# Patient Record
Sex: FEMALE | Race: BLACK OR AFRICAN AMERICAN | Employment: FULL TIME | URBAN - METROPOLITAN AREA
[De-identification: names, ages, dates, MRNs, and addresses within clinical notes are randomized per-mention and may not be internally consistent; named-entity substitution may affect disease eponyms.]

---

## 2017-12-21 ENCOUNTER — OFFICE VISIT (OUTPATIENT)
Dept: SURGERY | Age: 67
End: 2017-12-21

## 2017-12-21 VITALS
WEIGHT: 174 LBS | HEART RATE: 84 BPM | HEIGHT: 67 IN | SYSTOLIC BLOOD PRESSURE: 120 MMHG | RESPIRATION RATE: 12 BRPM | TEMPERATURE: 98.6 F | DIASTOLIC BLOOD PRESSURE: 64 MMHG | BODY MASS INDEX: 27.31 KG/M2

## 2017-12-21 DIAGNOSIS — R22.31 AXILLARY MASS, RIGHT: Primary | ICD-10-CM

## 2017-12-21 PROCEDURE — 99214 OFFICE O/P EST MOD 30 MIN: CPT | Performed by: SURGERY

## 2017-12-21 ASSESSMENT — ENCOUNTER SYMPTOMS
EYE DISCHARGE: 0
EYE PAIN: 0
ANAL BLEEDING: 0
SHORTNESS OF BREATH: 0
BACK PAIN: 0
STRIDOR: 0
CHEST TIGHTNESS: 0
TROUBLE SWALLOWING: 0
CONSTIPATION: 0
ABDOMINAL PAIN: 0
COLOR CHANGE: 0
VOMITING: 0

## 2017-12-21 NOTE — PROGRESS NOTES
Xochitl Tyler      I have reviewed the Medical Assistant's entries in the Past Medical History, Medications, Allergies, Social History and Vital Sign sections      Chief Complaint   Patient presents with    Surgical Consult         HPI      Pt here for right axillar lesions    Pt s/p open biopsy right axillary nodes 12/16  PATh benign reactive nodes      Notes new lesions  Similar to before  Not draining  Mild pain    Denies breast mass, discharge, skin changes        Recent US and MRI show several < 1 cm nodes    I have reviewed  the radiologic images as well as the reports. PMH    No                      Hypertension  No                      Lipids/cholesterol  No                      ASCAD  No                      Diabetes    Rheumatoid arthritis        No past medical history on file. Past Surgical History:   Procedure Laterality Date    AXILLARY SURGERY Right 12/21/2016    DR Reji Moreira    LYMPH NODE BIOPSY Left     2010    LYMPH NODE DISSECTION Right 12/21/2016    EXCISION  OF RIGHT AXILLARY MASS performed by Reginaldo Smith MD at [facility]         Current problems include  There is no problem list on file for this patient.           Social History     Social History    Marital status:      Spouse name: N/A    Number of children: N/A    Years of education: N/A     Social History Main Topics    Smoking status: Never Smoker    Smokeless tobacco: Not on file    Alcohol use No    Drug use: Unknown    Sexual activity: Not on file     Other Topics Concern    Not on file     Social History Narrative    No narrative on file                 Family History   Problem Relation Age of Onset    Arthritis Sister     Asthma Maternal Cousin          Current Outpatient Prescriptions   Medication Sig Dispense Refill    hydroxychloroquine (PLAQUENIL) 200 MG tablet Take 400 mg by mouth daily      etanercept (ENBREL) 50 MG/ML injection Inject 25 mg into the skin once  hydrochlorothiazide (MICROZIDE) 12.5 MG capsule Take 12.5 mg by mouth daily       No current facility-administered medications for this visit. No Known Allergies      Review of Systems   Constitutional: Negative for chills, fatigue, fever and unexpected weight change. HENT: Negative for nosebleeds and trouble swallowing. Eyes: Negative for pain and discharge. Respiratory: Negative for chest tightness, shortness of breath and stridor. Cardiovascular: Negative for chest pain, palpitations and leg swelling. Gastrointestinal: Negative for abdominal pain, anal bleeding, constipation and vomiting. Genitourinary: Negative for difficulty urinating, dysuria and hematuria. Musculoskeletal: Negative for back pain, joint swelling and neck pain. Skin: Negative for color change, rash and wound. Neurological: Negative for seizures, facial asymmetry, speech difficulty and headaches. Hematological: Negative for adenopathy. Does not bruise/bleed easily. Psychiatric/Behavioral: Negative for behavioral problems and hallucinations. The patient is not nervous/anxious. Vitals:    12/21/17 1006   BP: 120/64   Pulse: 84   Resp: 12   Temp: 98.6 °F (37 °C)           Physical Exam   Constitutional: She is oriented to person, place, and time. She appears well-developed and well-nourished. HENT:   Head: Normocephalic and atraumatic. Eyes: EOM are normal. Pupils are equal, round, and reactive to light. Neck: Normal range of motion. Neck supple. Cardiovascular: Normal rate and regular rhythm. No murmur heard. Pulmonary/Chest: Effort normal and breath sounds normal. No respiratory distress. She has no wheezes. She has no rales. Breast    No mass either side  No skin changes  No discharge   Abdominal: She exhibits no distension and no mass. There is no tenderness. There is no rebound. Musculoskeletal: Normal range of motion. She exhibits no edema.    Left axilla    No mass,nodes      Right

## 2018-06-19 DIAGNOSIS — Z12.31 SCREENING MAMMOGRAM, ENCOUNTER FOR: ICD-10-CM

## 2018-06-19 DIAGNOSIS — R22.31 AXILLARY MASS, RIGHT: Primary | ICD-10-CM

## 2018-06-21 DIAGNOSIS — R22.31 AXILLARY MASS, RIGHT: Primary | ICD-10-CM

## 2018-06-28 ENCOUNTER — OFFICE VISIT (OUTPATIENT)
Dept: SURGERY | Age: 68
End: 2018-06-28
Payer: COMMERCIAL

## 2018-06-28 VITALS
OXYGEN SATURATION: 95 % | WEIGHT: 172 LBS | HEART RATE: 98 BPM | TEMPERATURE: 97.9 F | BODY MASS INDEX: 27 KG/M2 | DIASTOLIC BLOOD PRESSURE: 74 MMHG | SYSTOLIC BLOOD PRESSURE: 124 MMHG | HEIGHT: 67 IN

## 2018-06-28 DIAGNOSIS — R22.31 AXILLARY MASS, RIGHT: Primary | ICD-10-CM

## 2018-06-28 PROCEDURE — 99213 OFFICE O/P EST LOW 20 MIN: CPT | Performed by: SURGERY

## 2018-06-28 ASSESSMENT — ENCOUNTER SYMPTOMS
SHORTNESS OF BREATH: 0
EYE PAIN: 0
ANAL BLEEDING: 0
VOMITING: 0
BACK PAIN: 0
CONSTIPATION: 0
CHEST TIGHTNESS: 0
COLOR CHANGE: 0
EYE DISCHARGE: 0
STRIDOR: 0
TROUBLE SWALLOWING: 0
ABDOMINAL PAIN: 0

## 2019-02-11 ENCOUNTER — HOSPITAL ENCOUNTER (EMERGENCY)
Age: 69
Discharge: HOME | End: 2019-02-11
Attending: EMERGENCY MEDICINE

## 2019-02-11 ENCOUNTER — APPOINTMENT (OUTPATIENT)
Dept: GENERAL RADIOLOGY | Age: 69
End: 2019-02-11

## 2019-02-11 VITALS
SYSTOLIC BLOOD PRESSURE: 137 MMHG | HEIGHT: 67 IN | DIASTOLIC BLOOD PRESSURE: 67 MMHG | RESPIRATION RATE: 20 BRPM | BODY MASS INDEX: 26.68 KG/M2 | OXYGEN SATURATION: 96 % | HEART RATE: 84 BPM | TEMPERATURE: 97.9 F | WEIGHT: 170 LBS

## 2019-02-11 DIAGNOSIS — S99.921A INJURY OF RIGHT FOOT, INITIAL ENCOUNTER: ICD-10-CM
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
## 2019-02-11 DIAGNOSIS — S93.601A SPRAIN OF RIGHT FOOT, INITIAL ENCOUNTER: ICD-10-CM

## 2019-02-11 PROCEDURE — 99283 EMERGENCY DEPT VISIT LOW MDM: CPT

## 2019-02-11 PROCEDURE — 73630 X-RAY EXAM OF FOOT: CPT

## 2019-02-11 RX ORDER — NAPROXEN 500 MG/1
500 TABLET ORAL 2 TIMES DAILY
Qty: 20 TABLET | Refills: 0 | Status: SHIPPED | OUTPATIENT
Start: 2019-02-11 | End: 2019-02-11

## 2019-02-11 RX ORDER — TRAMADOL HYDROCHLORIDE 50 MG/1
50 TABLET ORAL EVERY 8 HOURS PRN
Qty: 20 TABLET | Refills: 0 | Status: SHIPPED | OUTPATIENT
Start: 2019-02-11 | End: 2019-02-14

## 2019-02-11 ASSESSMENT — ENCOUNTER SYMPTOMS
VOMITING: 0
EYE PAIN: 0
ABDOMINAL PAIN: 0
DIARRHEA: 0
WHEEZING: 0
SINUS PRESSURE: 0
VOICE CHANGE: 0
SHORTNESS OF BREATH: 0
COUGH: 0
EYE REDNESS: 0
CHEST TIGHTNESS: 0
SORE THROAT: 0
CHOKING: 0
BACK PAIN: 0
CONSTIPATION: 0
TROUBLE SWALLOWING: 0
FACIAL SWELLING: 0
STRIDOR: 0
BLOOD IN STOOL: 0
EYE DISCHARGE: 0

## 2019-02-11 ASSESSMENT — PAIN DESCRIPTION - FREQUENCY: FREQUENCY: CONTINUOUS

## 2019-02-11 ASSESSMENT — PAIN SCALES - GENERAL: PAINLEVEL_OUTOF10: 7

## 2019-02-11 ASSESSMENT — PAIN DESCRIPTION - LOCATION: LOCATION: FOOT

## 2019-02-11 ASSESSMENT — PAIN DESCRIPTION - DESCRIPTORS: DESCRIPTORS: ACHING

## 2019-02-11 ASSESSMENT — PAIN DESCRIPTION - ORIENTATION: ORIENTATION: RIGHT

## 2019-03-15 ENCOUNTER — TELEPHONE (OUTPATIENT)
Dept: SURGERY | Age: 69
End: 2019-03-15

## 2022-10-05 ENCOUNTER — OFFICE VISIT (OUTPATIENT)
Dept: PAIN MANAGEMENT | Age: 72
End: 2022-10-05
Payer: MEDICARE

## 2022-10-05 VITALS — TEMPERATURE: 96.7 F | BODY MASS INDEX: 25.74 KG/M2 | HEIGHT: 67 IN | WEIGHT: 164 LBS

## 2022-10-05 DIAGNOSIS — M47.817 LUMBOSACRAL SPONDYLOSIS WITHOUT MYELOPATHY: Primary | ICD-10-CM

## 2022-10-05 DIAGNOSIS — M48.062 SPINAL STENOSIS OF LUMBAR REGION WITH NEUROGENIC CLAUDICATION: ICD-10-CM

## 2022-10-05 PROBLEM — I10 ESSENTIAL (PRIMARY) HYPERTENSION: Status: ACTIVE | Noted: 2022-02-11

## 2022-10-05 PROBLEM — Z86.73 PRSNL HX OF TIA (TIA), AND CEREB INFRC W/O RESID DEFICITS: Status: ACTIVE | Noted: 2022-06-29

## 2022-10-05 PROBLEM — K21.9 GASTRO-ESOPHAGEAL REFLUX DISEASE WITHOUT ESOPHAGITIS: Status: ACTIVE | Noted: 2022-06-29

## 2022-10-05 PROBLEM — M06.9 RHEUMATOID ARTHRITIS, UNSPECIFIED (HCC): Status: ACTIVE | Noted: 2022-06-29

## 2022-10-05 PROBLEM — M47.816 LUMBAR SPONDYLOSIS: Status: ACTIVE | Noted: 2022-10-05

## 2022-10-05 PROCEDURE — 99214 OFFICE O/P EST MOD 30 MIN: CPT | Performed by: NURSE PRACTITIONER

## 2022-10-05 PROCEDURE — 1123F ACP DISCUSS/DSCN MKR DOCD: CPT | Performed by: NURSE PRACTITIONER

## 2022-10-05 RX ORDER — CARVEDILOL 6.25 MG/1
6.25 TABLET ORAL 2 TIMES DAILY WITH MEALS
COMMUNITY

## 2022-10-05 RX ORDER — LEFLUNOMIDE 10 MG/1
10 TABLET ORAL DAILY
COMMUNITY

## 2022-10-05 RX ORDER — POTASSIUM CHLORIDE 1.5 G/1.77G
20 POWDER, FOR SOLUTION ORAL 2 TIMES DAILY
COMMUNITY

## 2022-10-05 RX ORDER — ALBUTEROL SULFATE 90 UG/1
AEROSOL, METERED RESPIRATORY (INHALATION)
COMMUNITY
Start: 2022-08-25

## 2022-10-05 RX ORDER — PREDNISONE 1 MG/1
TABLET ORAL
COMMUNITY
Start: 2022-09-29

## 2022-10-05 RX ORDER — AMLODIPINE BESYLATE 2.5 MG/1
2.5 TABLET ORAL DAILY
COMMUNITY

## 2022-10-05 RX ORDER — MAGNESIUM OXIDE 400 MG/1
400 TABLET ORAL DAILY
COMMUNITY

## 2022-10-05 ASSESSMENT — ENCOUNTER SYMPTOMS
DIARRHEA: 0
TROUBLE SWALLOWING: 0
CONSTIPATION: 0
COUGH: 0
BACK PAIN: 1
SHORTNESS OF BREATH: 0
GASTROINTESTINAL NEGATIVE: 1
EYES NEGATIVE: 1

## 2022-10-05 NOTE — PROGRESS NOTES
Florian Saxena  (1950)    10/5/2022    Subjective:     Florian Saxena is 81 y.o. female who complains today of:    Chief Complaint   Patient presents with    Follow-up     Discuss Lumbar Injections         Allergies:  Naproxen, Toradol [ketorolac tromethamine], and Ultram [tramadol]    Past Medical History:   Diagnosis Date    Arthritis      Past Surgical History:   Procedure Laterality Date    AXILLARY SURGERY Right 2016    DR Flo Whitney    LYMPH NODE BIOPSY Left     2010    LYMPH NODE DISSECTION Right 2016    EXCISION  OF RIGHT AXILLARY MASS performed by Norma Escalante MD at [address] History   Problem Relation Age of Onset    Arthritis Sister     Asthma Maternal Cousin      Social History     Socioeconomic History    Marital status:      Spouse name: Not on file    Number of children: Not on file    Years of education: Not on file    Highest education level: Not on file   Occupational History    Not on file   Tobacco Use    Smoking status: Former     Packs/day: 0.50     Years: 5.00     Pack years: 2.50     Types: Cigarettes     Start date:      Quit date:      Years since quittin.7    Smokeless tobacco: Never   Vaping Use    Vaping Use: Never used   Substance and Sexual Activity    Alcohol use: Yes     Comment: Socially    Drug use: No    Sexual activity: Never   Other Topics Concern    Not on file   Social History Narrative    Not on file     Social Determinants of Health     Financial Resource Strain: Not on file   Food Insecurity: Not on file   Transportation Needs: Not on file   Physical Activity: Not on file   Stress: Not on file   Social Connections: Not on file   Intimate Partner Violence: Not on file   Housing Stability: Not on file       Current Outpatient Medications on File Prior to Visit   Medication Sig Dispense Refill    leflunomide (ARAVA) 10 MG tablet Take 10 mg by mouth daily      magnesium oxide (MAG-OX) 400 MG tablet Take 400 mg by mouth daily      potassium chloride (KLOR-CON) 20 MEQ packet Take 20 mEq by mouth 2 times daily      vitamin D (CHOLECALCIFEROL) 25 MCG (1000 UT) TABS tablet Take 1,000 Units by mouth daily      carvedilol (COREG) 6.25 MG tablet Take 6.25 mg by mouth 2 times daily (with meals)      amLODIPine (NORVASC) 2.5 MG tablet Take 2.5 mg by mouth daily      gabapentin (NEURONTIN) 300 MG capsule Take 1 capsule by mouth 2 times daily for 30 days. 60 capsule 0    ibuprofen (ADVIL;MOTRIN) 600 MG tablet Take 600 mg by mouth      Hydroxychloroquine Sulfate (PLAQUENIL PO) Take 400 mg by mouth daily      atorvastatin (LIPITOR) 40 MG tablet Take 40 mg by mouth daily      hydrochlorothiazide (MICROZIDE) 12.5 MG capsule Take 12.5 mg by mouth daily      albuterol sulfate HFA (PROVENTIL;VENTOLIN;PROAIR) 108 (90 Base) MCG/ACT inhaler INHALE 2 PUFFS 4 TIMES DAILY AS NEEDED      predniSONE (DELTASONE) 5 MG tablet       famotidine (PEPCID) 20 MG tablet Take 20 mg by mouth 2 times daily (Patient not taking: Reported on 10/5/2022)      lidocaine (LMX) 4 % cream Apply a half dollar sized amount to intact skin topically up to twice daily as needed for pain (Patient not taking: Reported on 10/5/2022) 1 Tube 1    etanercept (ENBREL) 50 MG/ML injection Inject 25 mg into the skin once (Patient not taking: Reported on 10/5/2022)       No current facility-administered medications on file prior to visit. Pt presents today for a f/u of her pain. PCP is Dr. Lyric Rosales MD.  Patient was last seen by Dr. Spring Hurt on 12/16/2020. At that time he had complaints of neck pain and low back pain. Had cervical epidural C7-T1 IL NOY on 11/17/2020 with significant relief. It appears she continues to use gabapentin from another provider, but says not using this. She has a history of RA and lung cancer status post right upper lobe wedge resection, and hiatal hernia. She presents today for c/o low back pain.  Says pain will radiate across her low back and upper buttock. Denies radiating numbness into LE. Says neck and arms \"doing ok\". Says she was doing well until recently and the pain \"came back\". Pt feels pain level 8/10. Pt feels that moving, standing and sitting prolonged makes the pain worse, and motrin OTC and Tylenol OTC makes the pain better. Pt denies radiating numbness and tingling. Denies recent falls, injuries or trauma. Pt denies new weakness. Pt reports PT has been done years ago. Review of Systems   Constitutional: Negative. Negative for fatigue. HENT: Negative. Negative for trouble swallowing. Eyes: Negative. Respiratory:  Negative for cough and shortness of breath. Cardiovascular:  Negative for chest pain. Gastrointestinal: Negative. Negative for constipation and diarrhea. Endocrine: Negative. Genitourinary: Negative. Musculoskeletal:  Positive for back pain. Skin: Negative. Neurological:  Negative for dizziness, weakness and headaches. Hematological: Negative. Psychiatric/Behavioral: Negative. Reviewed Dr. Megan Cherry notes and reports from 12/2020:  XR L-spine flexion-extension 10/15/2020:  Instability L4-5, retrolisthesis L1-2. XR C-spine 10/15/2020:  Degenerative disc disease, facet arthropathy, no fracture, mid chest consolidation with associated thoracotomy surgical material, C6 central listhesis. EMG B UE Dr Key Young 8/24/20: bilateral C6-7 radiculopathy  MRI C Spine 9/4/20: heterogenous boen marrow. C2/3 normal canal. C3/4 bilateral foramen narrowing, mild canal stenosis. C4/5 moderate right greater than left foramen narrowing, mild canal stenosis. C5/6 moderate bilateral foramen narrowing, mild canal stenosis. C6/7 moderate left and mild canal stenosis. Small cystic structures parotid glands. MRI LS Spine 5/18/20: moderate to severe canal stenosis L4/5 and caudal foramen narrowing L5/S1.    Rheumatoid Arthritis followed by Dr Chelle Fuentes on Enbrel, lung cancer status post right upper lobe wedge resection, hiatal hernia  Dr Guidry Organ 7/1/20: Lumbar epidural steroid inj L4/5. 5 mL of Lidocaine 0.5% and 10 mg dexamethasone. MRI LS Spine 5/18/20: moderate to severe canal stenosis L4/5 and caudal foramen narrowing L5/S1. Objective:     Vitals:  Temp (!) 96.7 °F (35.9 °C) (Infrared)   Ht 5' 7\" (1.702 m)   Wt 164 lb (74.4 kg)   LMP  (LMP Unknown)   BMI 25.69 kg/m² Pain Score:   8      Physical Exam  Vitals and nursing note reviewed. This is a pleasant female who answers questions appropriately and follows commands. Pt is alert and oriented x 3. Recent and remote memory is intact. Mood and affect, judgement and insight are normal.  No signs of distress, no dyspnea or SOB noted. HEENT: PERRL. Neck is supple, trachea midline. No lymphadenopathy noted. Decreased ROM with flexion and extension of low back. Tender with palpation to lumbar spine with palpation bilaterally with positive provacative maneuvers noted. Negative SLR. Tightness in both hamstrings noted. Balance and coordination normal.  Strength is functional for ambulation. Cranial nerves II-XII are intact. Assessment:      Diagnosis Orders   1. Lumbosacral spondylosis without myelopathy  CHG FLUOR NEEDLE/CATH SPINE/PARASPINAL DX/THER ADDON    PA INJ DX/THER AGNT PARAVERT FACET JOINT, LUMBAR/SAC, 2ND LEVEL    PA INJ DX/THER AGNT PARAVERT FACET JOINT, LUMBAR/SAC, ADD LEVEL    PA INJ DX/THER AGNT PARAVERT FACET JOINT, LUMBAR/SAC, 1ST LEVEL    XR LUMBAR SPINE (MIN 4 VIEWS)      2. Spinal stenosis of lumbar region with neurogenic claudication  XR LUMBAR SPINE (MIN 4 VIEWS)          Plan:          No orders of the defined types were placed in this encounter.       Orders Placed This Encounter   Procedures    XR LUMBAR SPINE (MIN 4 VIEWS)     Standing Status:   Future     Standing Expiration Date:   1/3/2023     Scheduling Instructions:      Xray Lumbar AP lateral     Order Specific Question:   Reason for exam:     Answer: low back pain    CHG FLUOR NEEDLE/CATH SPINE/PARASPINAL DX/THER ADDON     Standing Status:   Future     Standing Expiration Date:   1/3/2023    DC INJ DX/THER AGNT PARAVERT FACET JOINT, LUMBAR/SAC, 2ND LEVEL     Standing Status:   Future     Standing Expiration Date:   1/3/2023    DC INJ DX/THER AGNT PARAVERT FACET JOINT, LUMBAR/SAC, ADD LEVEL     Standing Status:   Future     Standing Expiration Date:   1/3/2023    DC INJ DX/THER AGNT PARAVERT FACET JOINT, LUMBAR/SAC, 1ST LEVEL     Standing Status:   Future     Standing Expiration Date:   1/3/2023     Discussed options with the patient today. Anatomic model pathology was shown and reviewed with pt. We discussed the options in detail today. We will order bilateral facet joint injections L3-4, L4-5, L5-S1 with Dr. Bridget Villafana (ok if need L2,3,4,5 MBB). Will order updated XR of low back to evaluate further as well. She has done PT in the past. Conservative treatment, including anti-inflammatories, has been tried and failed. Anatomic model of pathology was shown. Risks and benefits of the procedure were discussed. All questions were answered and patient understands and agrees with the plan. She may need valium prior to procedure. She will call if needed  . Discussed home exercise program.  Relevant imaging and pain generators reviewed. Pt verbalized understanding and agrees with above plan. Pt has chronic pain. OARRS was reviewed. This NP saw pt under direct supervision of Dr. Bridget Villafana. Follow up:  Return for for procedure with Dr. Bridget Villafana.     Geetha Lindsey, APRN - CNP

## 2022-10-12 ENCOUNTER — TELEPHONE (OUTPATIENT)
Dept: PAIN MANAGEMENT | Age: 72
End: 2022-10-12

## 2022-10-12 NOTE — TELEPHONE ENCOUNTER
PLEASE CONTACT PATIENT AND SEE IF SHE HAS COMPLETED LUMBAR XR YET.     THIS WILL NEED TO BE COMPLETED IN ORDER FOR US TO SUBMIT FOR AUTH FOR THE LUMBAR MBB

## 2022-10-13 ENCOUNTER — TELEPHONE (OUTPATIENT)
Dept: PAIN MANAGEMENT | Age: 72
End: 2022-10-13

## 2022-10-13 DIAGNOSIS — M47.817 LUMBOSACRAL SPONDYLOSIS WITHOUT MYELOPATHY: ICD-10-CM

## 2022-10-13 DIAGNOSIS — M48.062 SPINAL STENOSIS OF LUMBAR REGION WITH NEUROGENIC CLAUDICATION: ICD-10-CM

## 2022-10-13 NOTE — TELEPHONE ENCOUNTER
ADELINE L3,4,5 MBB    AUTH FROM 10/19/22-11/1/22    OK to schedule procedure approved as above. Please note sides/levels approved and date range. (If applicable, sides/levels approved may differ from those ordered)    TO BE SCHEDULED WITH DR. Ricky Lowery

## 2022-10-20 ENCOUNTER — TELEPHONE (OUTPATIENT)
Dept: PAIN MANAGEMENT | Age: 72
End: 2022-10-20

## 2022-10-20 NOTE — TELEPHONE ENCOUNTER
BENEFITS: BILATERAL L3,4,5 MBB      Insurance: [facility]  Phone: [phone]  Contact Name: Donny Manzano  Effective Date: 1.1.2022     Plan year: YES-CALENDAR  Deductible: 0.00      Deductible Met: 0.00  Allowed/benefits paid at: 100% AFTER $35.00 COPAY  OOP: 3800.00 MET $1159.27  Freq Limits: 65734 & 64494--BASED ON MEDICAL NECESSITY  Prior Auth Requirement: AUTH IS REQUIRED THROUGH [facility]--AUTH COMPLETED BY [facility]    Notes: NO PRE-EX CLAUSE    Call Reference #: [id number]    Time of call: 11:20AM

## 2022-10-27 ENCOUNTER — PROCEDURE VISIT (OUTPATIENT)
Dept: PAIN MANAGEMENT | Age: 72
End: 2022-10-27
Payer: MEDICARE

## 2022-10-27 DIAGNOSIS — M47.817 LUMBOSACRAL SPONDYLOSIS WITHOUT MYELOPATHY: Primary | ICD-10-CM

## 2022-10-27 PROCEDURE — 64493 INJ PARAVERT F JNT L/S 1 LEV: CPT | Performed by: PHYSICAL MEDICINE & REHABILITATION

## 2022-10-27 PROCEDURE — 64494 INJ PARAVERT F JNT L/S 2 LEV: CPT | Performed by: PHYSICAL MEDICINE & REHABILITATION

## 2022-10-27 RX ORDER — BETAMETHASONE SODIUM PHOSPHATE AND BETAMETHASONE ACETATE 3; 3 MG/ML; MG/ML
3 INJECTION, SUSPENSION INTRA-ARTICULAR; INTRALESIONAL; INTRAMUSCULAR; SOFT TISSUE ONCE
Status: COMPLETED | OUTPATIENT
Start: 2022-10-27 | End: 2022-10-28

## 2022-10-27 RX ORDER — LIDOCAINE HYDROCHLORIDE 10 MG/ML
8 INJECTION, SOLUTION INFILTRATION; PERINEURAL ONCE
Status: COMPLETED | OUTPATIENT
Start: 2022-10-27 | End: 2022-10-28

## 2022-10-27 NOTE — PROGRESS NOTES
Lumbar Medial Branch Blocks      Patient Name: Rola Hardy   : 1950  Date: 10/27/2022     Provider: Francesca Velásquez MD        Rola Hardy is here today for interventional pain management. Preoperatively, the patient presents with symptoms and physical exam findings consistent with lumbar facet zygapophyseal joint mediated pain. She has had persistent pain that limits her function and activities of daily living. The pain is persistent despite conservative measures. She has significant functional and psychological impairment due to this condition. Given her symptoms, physical exam findings, impairment in activities of daily living, and lack of response to conservative measures, consideration for lumbar medial branch blocks was given. Discussed the risks of the procedure including, but not limited to, bleeding, infection, worsened pain, damage to surrounding structures, side effects, toxicity, allergic reactions to medications used, immune and stress-response dysfunction, fat necrosis, avascular necrosis, skin pigmentation changes, blood sugar elevation, headache, vision changes, need for surgery, as well as catastrophic injury such as vision loss, paralysis, stroke, spinal cord infarction or injury, intrathecal injection, spinal cord puncture, arachnoiditis, bowel or bladder incontinence, loss of use of the legs, ventilator dependence, and death. Discussed the risks, benefits, alternative procedures, and alternatives to the procedure including no procedure at all. Discussed that we cannot undo any permanent neurologic damage or change the course of any underlying disease. After thorough discussion, patient expressed understanding and willingness to proceed. Written consent was obtained and is in the chart. Verbal consent to proceed was obtained. Standard ASIPP guidelines were followed and sterile technique used. Area was cleaned with Betadine three times.  Fluoroscopic guidance was used for this procedure. The L5 vertebral body was taken as the first lumbar-appearing vertebral body directly above the sacrum on a lateral view. Junction of superior articular process and transverse process was identified. For L5 dorsal primary ramus groove of sacral ala and superior articular process of S1 was identified. Then two 26 gauge 3.5 inch spinal needles were used and each needle was advanced to each medial branch and/or dorsal ramus. Aspiration was negative throughout the procedure. Oblique and declined views were obtained. Each medial branch and/or dorsal ramus was treated with approximately 0.5 mL of 1% preservative-free Lidocaine. A total of 0.5 mL of 3 mg of Betamethasone was used for today's procedure. She tolerated the procedure fair, no obvious complications occurred during the procedure. She was appropriately monitored and discharged home in stable condition with her usual motor strength. She will keep close track of pain over the next several hours and call our office tomorrow and let us know what percentage of pain relief is experienced. Postoperative instructions were provided.             [x] Bilateral [] T12    [] L1   [] Right [] L2    [x] L3   [] Left [x] L4      [x] L5         [address]  Phone [phone]

## 2022-10-28 RX ADMIN — LIDOCAINE HYDROCHLORIDE 8 ML: 10 INJECTION, SOLUTION INFILTRATION; PERINEURAL at 07:37

## 2022-10-28 RX ADMIN — Medication 0.5 MEQ: at 07:36

## 2022-10-28 RX ADMIN — BETAMETHASONE SODIUM PHOSPHATE AND BETAMETHASONE ACETATE 3 MG: 3; 3 INJECTION, SUSPENSION INTRA-ARTICULAR; INTRALESIONAL; INTRAMUSCULAR; SOFT TISSUE at 07:36

## 2022-11-17 ENCOUNTER — OFFICE VISIT (OUTPATIENT)
Dept: PAIN MANAGEMENT | Age: 72
End: 2022-11-17
Payer: MEDICARE

## 2022-11-17 VITALS
HEIGHT: 67 IN | BODY MASS INDEX: 26.37 KG/M2 | SYSTOLIC BLOOD PRESSURE: 124 MMHG | DIASTOLIC BLOOD PRESSURE: 72 MMHG | WEIGHT: 168 LBS | TEMPERATURE: 96.7 F

## 2022-11-17 DIAGNOSIS — M47.817 LUMBOSACRAL SPONDYLOSIS WITHOUT MYELOPATHY: Primary | ICD-10-CM

## 2022-11-17 PROCEDURE — 99213 OFFICE O/P EST LOW 20 MIN: CPT | Performed by: NURSE PRACTITIONER

## 2022-11-17 PROCEDURE — 3078F DIAST BP <80 MM HG: CPT | Performed by: NURSE PRACTITIONER

## 2022-11-17 PROCEDURE — 3074F SYST BP LT 130 MM HG: CPT | Performed by: NURSE PRACTITIONER

## 2022-11-17 PROCEDURE — 1123F ACP DISCUSS/DSCN MKR DOCD: CPT | Performed by: NURSE PRACTITIONER

## 2022-11-17 ASSESSMENT — ENCOUNTER SYMPTOMS
BACK PAIN: 1
DIARRHEA: 0
CONSTIPATION: 0
RESPIRATORY NEGATIVE: 1

## 2022-11-17 NOTE — PROGRESS NOTES
Patient: Michael Dey  YOB: 1950  Date: 22        Subjective:     Michael Dey is a 81 y.o. female who complains today of:    Chief Complaint   Patient presents with    Back Pain         Allergies:  Naproxen, Toradol [ketorolac tromethamine], and Ultram [tramadol]    Past Medical History:   Diagnosis Date    Arthritis      Past Surgical History:   Procedure Laterality Date    AXILLARY SURGERY Right 2016    DR Gerri Lesch    LYMPH NODE BIOPSY Left     2010    LYMPH NODE DISSECTION Right 2016    EXCISION  OF RIGHT AXILLARY MASS performed by Wendy Dorsey MD at [address] History   Problem Relation Age of Onset    Arthritis Sister     Asthma Maternal Cousin      Social History     Socioeconomic History    Marital status:      Spouse name: Not on file    Number of children: Not on file    Years of education: Not on file    Highest education level: Not on file   Occupational History    Not on file   Tobacco Use    Smoking status: Former     Packs/day: 0.50     Years: 5.00     Pack years: 2.50     Types: Cigarettes     Start date:      Quit date:      Years since quittin.9    Smokeless tobacco: Never   Vaping Use    Vaping Use: Never used   Substance and Sexual Activity    Alcohol use: Yes     Comment: Socially    Drug use: No    Sexual activity: Never   Other Topics Concern    Not on file   Social History Narrative    Not on file     Social Determinants of Health     Financial Resource Strain: Not on file   Food Insecurity: Not on file   Transportation Needs: Not on file   Physical Activity: Not on file   Stress: Not on file   Social Connections: Not on file   Intimate Partner Violence: Not on file   Housing Stability: Not on file       Current Outpatient Medications on File Prior to Visit   Medication Sig Dispense Refill    leflunomide (ARAVA) 10 MG tablet Take 10 mg by mouth daily      magnesium oxide (MAG-OX) 400 MG tablet Take 400 mg by mouth daily      potassium chloride (KLOR-CON) 20 MEQ packet Take 20 mEq by mouth 2 times daily      vitamin D (CHOLECALCIFEROL) 25 MCG (1000 UT) TABS tablet Take 1,000 Units by mouth daily      carvedilol (COREG) 6.25 MG tablet Take 6.25 mg by mouth 2 times daily (with meals)      amLODIPine (NORVASC) 2.5 MG tablet Take 2.5 mg by mouth daily      albuterol sulfate HFA (PROVENTIL;VENTOLIN;PROAIR) 108 (90 Base) MCG/ACT inhaler INHALE 2 PUFFS 4 TIMES DAILY AS NEEDED      ibuprofen (ADVIL;MOTRIN) 600 MG tablet Take 600 mg by mouth      Hydroxychloroquine Sulfate (PLAQUENIL PO) Take 400 mg by mouth daily      atorvastatin (LIPITOR) 40 MG tablet Take 40 mg by mouth daily      hydrochlorothiazide (MICROZIDE) 12.5 MG capsule Take 12.5 mg by mouth daily      gabapentin (NEURONTIN) 300 MG capsule Take 1 capsule by mouth 2 times daily for 30 days. 60 capsule 0     No current facility-administered medications on file prior to visit. 10/27/22 MBB #1 L345 relieved the back pain but now has tingling down left leg to ankle    Patient was last seen by Dr. Kp Ellsworth on 12/16/2020. At that time he had complaints of neck pain and low back pain. Had cervical epidural C7-T1 IL NOY on 11/17/2020 with significant relief. It appears she continues to use gabapentin from another provider, but says not using this. She has a history of RA and lung cancer status post right upper lobe wedge resection, and hiatal hernia. She presents today for c/o low back pain. Says pain will radiate across her low back and upper buttock. Denies radiating numbness into LE. Says neck and arms \"doing ok\". Says she was doing well until recently and the pain \"came back\". Pt feels pain level 8/10. Pt feels that moving, standing and sitting prolonged makes the pain worse, and motrin OTC and Tylenol OTC makes the pain better.  works as [employer]. Pt denies radiating numbness and tingling. Denies recent falls, injuries or trauma.   Pt denies new weakness. Pt reports PT has been done years ago. She has a history of RA  and Lupus on new medication and lung cancer status post right upper lobe wedge resection, and hiatal hernia. Review of Systems   Constitutional: Negative. Negative for activity change and unexpected weight change. HENT: Negative. Negative for hearing loss. Respiratory: Negative. Cardiovascular:  Negative for leg swelling. Gastrointestinal:  Negative for constipation and diarrhea. Genitourinary: Negative. Musculoskeletal:  Positive for back pain. Negative for gait problem, joint swelling and neck pain. Skin:  Negative for rash. Neurological:  Negative for dizziness, weakness, numbness and headaches. Psychiatric/Behavioral: Negative. Negative for sleep disturbance. Objective:     Vitals:  /72 (Site: Right Upper Arm)   Temp (!) 96.7 °F (35.9 °C) (Temporal)   Ht 5' 7\" (1.702 m)   Wt 168 lb (76.2 kg)   LMP  (LMP Unknown)   BMI 26.31 kg/m² Pain Score:   3    Physical Exam  Vitals reviewed. Constitutional:       Appearance: She is well-developed. HENT:      Head: Normocephalic. Nose: Nose normal.   Pulmonary:      Effort: Pulmonary effort is normal.   Musculoskeletal:      Cervical back: Normal range of motion and neck supple. Lumbar back: Tenderness present. Decreased range of motion. Negative right straight leg raise test and negative left straight leg raise test.      Comments: Tingling left leg with SLR   Lymphadenopathy:      Cervical: No cervical adenopathy. Skin:     General: Skin is warm and dry. Findings: No erythema or rash. Neurological:      Mental Status: She is alert and oriented to person, place, and time. Cranial Nerves: No cranial nerve deficit. Deep Tendon Reflexes: Reflexes are normal and symmetric. Reflexes normal.   Psychiatric:         Mood and Affect: Mood normal.         Behavior: Behavior normal.         Thought Content:  Thought content normal.         Judgment: Judgment normal.          Assessment:        Diagnosis Orders   1. Lumbosacral spondylosis without myelopathy            Plan:          No orders of the defined types were placed in this encounter. No orders of the defined types were placed in this encounter.  -she is on new medication for lupus and states her immune system is down and she doesn't want to return for EMG. She also doesn't have back pain and doesn't need the RFA for now. She declines any further tx until she gets used to her meds. Follow up:  Return if symptoms worsen or fail to improve.     Sylvester Petty, APRN - CNP

## 2023-09-13 LAB
APPEARANCE, URINE: CLEAR
BILIRUBIN, URINE: NEGATIVE
BLOOD, URINE: NEGATIVE
COLOR, URINE: NORMAL
GLUCOSE, URINE: NEGATIVE MG/DL
KETONES, URINE: NEGATIVE MG/DL
LEUKOCYTE ESTERASE, URINE: NEGATIVE
NITRITE, URINE: NEGATIVE
PH, URINE: 6 (ref 5–8)
PROTEIN, URINE: NEGATIVE MG/DL
SPECIFIC GRAVITY, URINE: 1.01 (ref 1–1.03)
UROBILINOGEN, URINE: <2 MG/DL (ref 0–1.9)

## 2023-09-15 LAB — URINE CULTURE: NORMAL

## 2023-10-22 ENCOUNTER — APPOINTMENT (OUTPATIENT)
Dept: RADIOLOGY | Facility: HOSPITAL | Age: 73
End: 2023-10-22
Payer: MEDICARE

## 2023-10-22 ENCOUNTER — HOSPITAL ENCOUNTER (EMERGENCY)
Facility: HOSPITAL | Age: 73
Discharge: HOME | End: 2023-10-22
Attending: STUDENT IN AN ORGANIZED HEALTH CARE EDUCATION/TRAINING PROGRAM
Payer: MEDICARE

## 2023-10-22 VITALS
HEART RATE: 66 BPM | SYSTOLIC BLOOD PRESSURE: 144 MMHG | TEMPERATURE: 97.2 F | DIASTOLIC BLOOD PRESSURE: 72 MMHG | BODY MASS INDEX: 26.68 KG/M2 | WEIGHT: 170 LBS | HEIGHT: 67 IN | OXYGEN SATURATION: 99 % | RESPIRATION RATE: 21 BRPM

## 2023-10-22 DIAGNOSIS — M94.0 COSTOCHONDRITIS: Primary | ICD-10-CM

## 2023-10-22 LAB
ALBUMIN SERPL BCP-MCNC: 4.2 G/DL (ref 3.4–5)
ALP SERPL-CCNC: 59 U/L (ref 33–136)
ALT SERPL W P-5'-P-CCNC: 28 U/L (ref 7–45)
ANION GAP SERPL CALC-SCNC: 7 MMOL/L (ref 10–20)
AST SERPL W P-5'-P-CCNC: 35 U/L (ref 9–39)
BASOPHILS # BLD MANUAL: 0 X10*3/UL (ref 0–0.1)
BASOPHILS NFR BLD MANUAL: 0 %
BILIRUB SERPL-MCNC: 0.5 MG/DL (ref 0–1.2)
BUN SERPL-MCNC: 15 MG/DL (ref 6–23)
CALCIUM SERPL-MCNC: 9.5 MG/DL (ref 8.6–10.3)
CARDIAC TROPONIN I PNL SERPL HS: 6 NG/L (ref 0–13)
CHLORIDE SERPL-SCNC: 101 MMOL/L (ref 98–107)
CO2 SERPL-SCNC: 30 MMOL/L (ref 21–32)
CREAT SERPL-MCNC: 0.85 MG/DL (ref 0.5–1.05)
EOSINOPHIL # BLD MANUAL: 0.03 X10*3/UL (ref 0–0.4)
EOSINOPHIL NFR BLD MANUAL: 1 %
ERYTHROCYTE [DISTWIDTH] IN BLOOD BY AUTOMATED COUNT: 11.7 % (ref 11.5–14.5)
GFR SERPL CREATININE-BSD FRML MDRD: 73 ML/MIN/1.73M*2
GLUCOSE SERPL-MCNC: 91 MG/DL (ref 74–99)
HCT VFR BLD AUTO: 39.1 % (ref 36–46)
HGB BLD-MCNC: 13.2 G/DL (ref 12–16)
HOLD SPECIMEN: NORMAL
IMM GRANULOCYTES # BLD AUTO: 0 X10*3/UL (ref 0–0.5)
IMM GRANULOCYTES NFR BLD AUTO: 0 % (ref 0–0.9)
LYMPHOCYTES # BLD MANUAL: 1.83 X10*3/UL (ref 0.8–3)
LYMPHOCYTES NFR BLD MANUAL: 59 %
MCH RBC QN AUTO: 31.5 PG (ref 26–34)
MCHC RBC AUTO-ENTMCNC: 33.8 G/DL (ref 32–36)
MCV RBC AUTO: 93 FL (ref 80–100)
MONOCYTES # BLD MANUAL: 0.34 X10*3/UL (ref 0.05–0.8)
MONOCYTES NFR BLD MANUAL: 11 %
NEUTS SEG # BLD MANUAL: 0.84 X10*3/UL (ref 1.6–5)
NEUTS SEG NFR BLD MANUAL: 27 %
NRBC BLD-RTO: 0 /100 WBCS (ref 0–0)
PLATELET # BLD AUTO: 206 X10*3/UL (ref 150–450)
PMV BLD AUTO: 10 FL (ref 7.5–11.5)
POTASSIUM SERPL-SCNC: 4.1 MMOL/L (ref 3.5–5.3)
PROT SERPL-MCNC: 8 G/DL (ref 6.4–8.2)
RBC # BLD AUTO: 4.19 X10*6/UL (ref 4–5.2)
RBC MORPH BLD: ABNORMAL
SODIUM SERPL-SCNC: 134 MMOL/L (ref 136–145)
TOTAL CELLS COUNTED BLD: 100
VARIANT LYMPHS # BLD MANUAL: 0.06 X10*3/UL (ref 0–0.3)
VARIANT LYMPHS NFR BLD: 2 %
WBC # BLD AUTO: 3.1 X10*3/UL (ref 4.4–11.3)

## 2023-10-22 PROCEDURE — 85007 BL SMEAR W/DIFF WBC COUNT: CPT | Performed by: STUDENT IN AN ORGANIZED HEALTH CARE EDUCATION/TRAINING PROGRAM

## 2023-10-22 PROCEDURE — 93010 ELECTROCARDIOGRAM REPORT: CPT | Performed by: EMERGENCY MEDICINE

## 2023-10-22 PROCEDURE — 85027 COMPLETE CBC AUTOMATED: CPT | Performed by: STUDENT IN AN ORGANIZED HEALTH CARE EDUCATION/TRAINING PROGRAM

## 2023-10-22 PROCEDURE — 2500000004 HC RX 250 GENERAL PHARMACY W/ HCPCS (ALT 636 FOR OP/ED)

## 2023-10-22 PROCEDURE — 36415 COLL VENOUS BLD VENIPUNCTURE: CPT | Performed by: STUDENT IN AN ORGANIZED HEALTH CARE EDUCATION/TRAINING PROGRAM

## 2023-10-22 PROCEDURE — 71045 X-RAY EXAM CHEST 1 VIEW: CPT | Mod: FY,FR

## 2023-10-22 PROCEDURE — 99284 EMERGENCY DEPT VISIT MOD MDM: CPT | Performed by: STUDENT IN AN ORGANIZED HEALTH CARE EDUCATION/TRAINING PROGRAM

## 2023-10-22 PROCEDURE — 99285 EMERGENCY DEPT VISIT HI MDM: CPT | Performed by: EMERGENCY MEDICINE

## 2023-10-22 PROCEDURE — 80053 COMPREHEN METABOLIC PANEL: CPT | Performed by: STUDENT IN AN ORGANIZED HEALTH CARE EDUCATION/TRAINING PROGRAM

## 2023-10-22 PROCEDURE — 71045 X-RAY EXAM CHEST 1 VIEW: CPT | Mod: FOREIGN READ | Performed by: RADIOLOGY

## 2023-10-22 PROCEDURE — 96374 THER/PROPH/DIAG INJ IV PUSH: CPT

## 2023-10-22 PROCEDURE — 84484 ASSAY OF TROPONIN QUANT: CPT | Performed by: STUDENT IN AN ORGANIZED HEALTH CARE EDUCATION/TRAINING PROGRAM

## 2023-10-22 RX ORDER — IBUPROFEN 600 MG/1
600 TABLET ORAL EVERY 6 HOURS SCHEDULED
Status: DISCONTINUED | OUTPATIENT
Start: 2023-10-22 | End: 2023-10-22 | Stop reason: HOSPADM

## 2023-10-22 RX ORDER — LIDOCAINE 50 MG/G
1 PATCH TOPICAL DAILY
Qty: 4 PATCH | Refills: 0 | Status: SHIPPED | OUTPATIENT
Start: 2023-10-22 | End: 2024-04-02 | Stop reason: HOSPADM

## 2023-10-22 RX ORDER — KETOROLAC TROMETHAMINE 15 MG/ML
15 INJECTION, SOLUTION INTRAMUSCULAR; INTRAVENOUS ONCE
Status: COMPLETED | OUTPATIENT
Start: 2023-10-22 | End: 2023-10-22

## 2023-10-22 RX ORDER — TIZANIDINE 2 MG/1
2 TABLET ORAL EVERY 6 HOURS
Qty: 30 TABLET | Refills: 0 | Status: SHIPPED | OUTPATIENT
Start: 2023-10-22 | End: 2024-04-02 | Stop reason: HOSPADM

## 2023-10-22 RX ADMIN — KETOROLAC TROMETHAMINE 15 MG: 15 INJECTION INTRAMUSCULAR; INTRAVENOUS at 16:01

## 2023-10-22 ASSESSMENT — PAIN SCALES - GENERAL
PAINLEVEL_OUTOF10: 9
PAINLEVEL_OUTOF10: 6

## 2023-10-22 ASSESSMENT — LIFESTYLE VARIABLES
REASON UNABLE TO ASSESS: NO
HAVE YOU EVER FELT YOU SHOULD CUT DOWN ON YOUR DRINKING: NO
HAVE PEOPLE ANNOYED YOU BY CRITICIZING YOUR DRINKING: NO
EVER HAD A DRINK FIRST THING IN THE MORNING TO STEADY YOUR NERVES TO GET RID OF A HANGOVER: NO
EVER FELT BAD OR GUILTY ABOUT YOUR DRINKING: NO

## 2023-10-22 ASSESSMENT — COLUMBIA-SUICIDE SEVERITY RATING SCALE - C-SSRS
1. IN THE PAST MONTH, HAVE YOU WISHED YOU WERE DEAD OR WISHED YOU COULD GO TO SLEEP AND NOT WAKE UP?: NO
2. HAVE YOU ACTUALLY HAD ANY THOUGHTS OF KILLING YOURSELF?: NO
6. HAVE YOU EVER DONE ANYTHING, STARTED TO DO ANYTHING, OR PREPARED TO DO ANYTHING TO END YOUR LIFE?: NO

## 2023-10-22 ASSESSMENT — HEART SCORE
TROPONIN: LESS THAN OR EQUAL TO NORMAL LIMIT
AGE: 65+
RISK FACTORS: 1-2 RISK FACTORS
ECG: NORMAL
HEART SCORE: 3
HISTORY: SLIGHTLY SUSPICIOUS

## 2023-10-22 ASSESSMENT — PAIN DESCRIPTION - PAIN TYPE: TYPE: ACUTE PAIN

## 2023-10-22 ASSESSMENT — PAIN - FUNCTIONAL ASSESSMENT: PAIN_FUNCTIONAL_ASSESSMENT: 0-10

## 2023-10-22 ASSESSMENT — PAIN DESCRIPTION - LOCATION: LOCATION: CHEST

## 2023-10-22 ASSESSMENT — PAIN DESCRIPTION - DESCRIPTORS: DESCRIPTORS: ACHING

## 2023-10-22 NOTE — DISCHARGE INSTRUCTIONS
Seek immediate medical attention should you have:  shortness of breath, lightheadedness, weakness, confusion, jaw pain, numbness down arm or any worsening or concerning symptoms.

## 2023-10-22 NOTE — ED PROVIDER NOTES
HPI   Chief Complaint   Patient presents with   • Chest Pain       73-year-old female with a history of SLE, HTN, and lung cancer s/p resection 2001 presenting to [facility] ED with complaint of left-sided chest pain.  She reports having left-sided chest pain for the past week that has been constant at an 8/10 radiating to her back described as if someone punched her chest that is worse with deep breaths or any movements up to a 10/10.  States that she occasionally feels throat soreness with it.  Reports taking Tylenol and Motrin without much relief.  States she was seen for this at another hospital in which her cardiac work-up was negative and they treated her for acid reflux but this did not help.  Denies numbness or tingling down the arms, jaw pain, lightheadedness, coughing, leg swelling, family history of heart attacks, or any smoking for the past 50 years.      History provided by:  Patient                      No data recorded HEART Score: 3                Patient History   Past Medical History:   Diagnosis Date   • Personal history of other diseases of the circulatory system     History of hypertension   • Personal history of other diseases of the musculoskeletal system and connective tissue     History of rheumatoid arthritis   • Personal history of other malignant neoplasm of bronchus and lung     History of malignant neoplasm of lung   • Systemic lupus erythematosus, unspecified (CMS/HCC)     Lupus     Past Surgical History:   Procedure Laterality Date   • MR HEAD ANGIO WO IV CONTRAST  9/4/2020    MR HEAD ANGIO WO IV CONTRAST 9/4/2020 [facility] EMERGENCY LEGACY   • OTHER SURGICAL HISTORY  01/16/2021    Lumpectomy   • OTHER SURGICAL HISTORY  01/28/2022    Hysterectomy   • US ASPIRATION INJECTION INTERMEDIATE JOINT  6/10/2021    US ASPIRATION INJECTION INTERMEDIATE JOINT 6/10/2021 [facility]     No family history on file.  Social History     Tobacco Use   • Smoking status: Former     Types: Cigarettes   •  Smokeless tobacco: Not on file   Substance Use Topics   • Alcohol use: Never   • Drug use: Never       Physical Exam   ED Triage Vitals [10/22/23 1440]   Temp Heart Rate Resp BP   36.2 °C (97.2 °F) 88 16 143/76      SpO2 Temp Source Heart Rate Source Patient Position   96 % Temporal -- Sitting      BP Location FiO2 (%)     Right arm --       Physical Exam  Vitals and nursing note reviewed.   Constitutional:       General: She is awake. She is not in acute distress.     Appearance: Normal appearance. She is well-developed.   HENT:      Head: Normocephalic and atraumatic.      Right Ear: External ear normal.      Left Ear: External ear normal.      Nose: Nose normal. No congestion or rhinorrhea.      Mouth/Throat:      Mouth: Mucous membranes are moist.      Pharynx: Oropharynx is clear. No posterior oropharyngeal erythema.   Eyes:      General: No scleral icterus.        Right eye: No discharge.         Left eye: No discharge.      Extraocular Movements: Extraocular movements intact.      Conjunctiva/sclera: Conjunctivae normal.   Cardiovascular:      Rate and Rhythm: Normal rate and regular rhythm.      Pulses: Normal pulses.      Heart sounds: Normal heart sounds. No murmur heard.     No friction rub.   Pulmonary:      Effort: Pulmonary effort is normal. No respiratory distress.      Breath sounds: Normal breath sounds. No stridor. No wheezing or rales.   Chest:      Chest wall: Tenderness present.       Abdominal:      General: There is no distension.      Palpations: Abdomen is soft.      Tenderness: There is no abdominal tenderness. There is no right CVA tenderness, left CVA tenderness, guarding or rebound.   Musculoskeletal:         General: No swelling.      Cervical back: Normal range of motion and neck supple. No deformity or tenderness.      Thoracic back: Tenderness present. No deformity.      Lumbar back: No deformity or tenderness.        Back:       Right lower leg: No edema.      Left lower leg: No  edema.   Skin:     General: Skin is warm and dry.      Capillary Refill: Capillary refill takes less than 2 seconds.      Coloration: Skin is not jaundiced or pale.      Findings: No lesion or rash.   Neurological:      General: No focal deficit present.      Mental Status: She is alert and oriented to person, place, and time. Mental status is at baseline.      Cranial Nerves: No cranial nerve deficit.      Sensory: No sensory deficit.      Motor: No weakness.   Psychiatric:         Mood and Affect: Mood normal.         Behavior: Behavior normal. Behavior is cooperative.         Thought Content: Thought content normal.         Judgment: Judgment normal.       ED Course & MDM   ED Course as of 10/22/23 2359   Sun Oct 22, 2023   1652 Troponin I, High Sensitivity: 6 [ES]      ED Course User Index  [ES] Cameron Young MD         Diagnoses as of 10/22/23 2359   Costochondritis       Medical Decision Making  73-year-old female with a history mentioned below presenting to ED with complaint of chest pain.    Patient is hypertensive at 143/76 but otherwise afebrile, hemodynamically stable on room air, and in no acute distress.  Physical exam findings mentioned above.  CBC, CMP, EKG, troponin, and CXR ordered.  Toradol provided.  Bedside cardiac ultrasound performed and was unimpressive for pericardial effusion.    EKG Interpretation: Rate: 78. Rhythm: Sinus.  LAD.  No delta-wave, FARHAD, deep TWI or biphasic t-waves in V1-V5, or LBBB. QTc: not prolonged at 410 ms.    My independent interpretation of labs:  Labs returned unimpressive for systemic inflammation or infection, acute anemia or blood loss, MADY, hepatitis, chronic ischemia, or electrolyte abnormalities.  My indpendent interpretation of imaging:  CXR unimpressive for pneumonia, including effusions, infiltrates, or consolidations and no sign of pneumothorax or aortic widening.    Reassessment:  Patient reports pain improved to a 6/10 and not the edge off after receiving  Toradol.  Patient reassured of findings unimpressive for cardiac origin of pain.  We suspect inflammation of the tendons and/or ligaments of the thoracic wall.  Should symptoms persist, she may benefit from steroids.    Disposition: Discussed patient who agreed with discharge.  She will utilize tizanidine, Motrin scheduled, and lidocaine patches.  She will follow-up with her PCP and rheumatologist as scheduled this week.  Strict return precautions provided.        Procedure  Procedures     Cameron Young MD  Resident  10/23/23 0002

## 2023-10-24 ENCOUNTER — HOSPITAL ENCOUNTER (OUTPATIENT)
Dept: CARDIOLOGY | Facility: HOSPITAL | Age: 73
Discharge: HOME | End: 2023-10-24
Payer: MEDICARE

## 2023-10-24 LAB
ATRIAL RATE: 78 BPM
P OFFSET: 198 MS
P ONSET: 143 MS
PR INTERVAL: 158 MS
Q ONSET: 222 MS
QRS COUNT: 13 BEATS
QRS DURATION: 90 MS
QT INTERVAL: 360 MS
QTC CALCULATION(BAZETT): 410 MS
QTC FREDERICIA: 393 MS
R AXIS: -26 DEGREES
T AXIS: 132 DEGREES
T OFFSET: 402 MS
VENTRICULAR RATE: 78 BPM

## 2023-10-24 PROCEDURE — 93005 ELECTROCARDIOGRAM TRACING: CPT

## 2023-12-18 ENCOUNTER — APPOINTMENT (OUTPATIENT)
Dept: UROLOGY | Facility: CLINIC | Age: 73
End: 2023-12-18
Payer: MEDICARE

## 2024-01-02 ENCOUNTER — OFFICE VISIT (OUTPATIENT)
Dept: UROLOGY | Facility: CLINIC | Age: 74
End: 2024-01-02
Payer: MEDICARE

## 2024-01-02 DIAGNOSIS — N39.41 URGE INCONTINENCE OF URINE: Primary | ICD-10-CM

## 2024-01-02 PROCEDURE — 1126F AMNT PAIN NOTED NONE PRSNT: CPT | Performed by: UROLOGY

## 2024-01-02 NOTE — PROGRESS NOTES
Patient present for flow rate and post void residual after being placed on Sanctura.    Patient states she only took Sanctura for one week, could not continue due to dry mouth and headaches. She has started water aerobics for one month and has noticed an improvement with holding her bladder.

## 2024-01-08 PROBLEM — N39.41 URGE INCONTINENCE OF URINE: Status: ACTIVE | Noted: 2024-01-08

## 2024-02-26 ENCOUNTER — APPOINTMENT (OUTPATIENT)
Dept: RADIOLOGY | Facility: HOSPITAL | Age: 74
End: 2024-02-26
Payer: MEDICARE

## 2024-02-26 ENCOUNTER — HOSPITAL ENCOUNTER (EMERGENCY)
Facility: HOSPITAL | Age: 74
Discharge: HOME | End: 2024-02-26
Attending: EMERGENCY MEDICINE
Payer: MEDICARE

## 2024-02-26 VITALS
DIASTOLIC BLOOD PRESSURE: 89 MMHG | SYSTOLIC BLOOD PRESSURE: 197 MMHG | WEIGHT: 175 LBS | TEMPERATURE: 97.2 F | BODY MASS INDEX: 27.47 KG/M2 | HEIGHT: 67 IN | HEART RATE: 78 BPM | OXYGEN SATURATION: 99 % | RESPIRATION RATE: 18 BRPM

## 2024-02-26 DIAGNOSIS — M25.552 LEFT HIP PAIN: Primary | ICD-10-CM

## 2024-02-26 PROCEDURE — 99283 EMERGENCY DEPT VISIT LOW MDM: CPT

## 2024-02-26 PROCEDURE — 96372 THER/PROPH/DIAG INJ SC/IM: CPT

## 2024-02-26 PROCEDURE — 2500000004 HC RX 250 GENERAL PHARMACY W/ HCPCS (ALT 636 FOR OP/ED)

## 2024-02-26 PROCEDURE — 99284 EMERGENCY DEPT VISIT MOD MDM: CPT | Performed by: EMERGENCY MEDICINE

## 2024-02-26 PROCEDURE — 73502 X-RAY EXAM HIP UNI 2-3 VIEWS: CPT | Mod: LT

## 2024-02-26 PROCEDURE — 73502 X-RAY EXAM HIP UNI 2-3 VIEWS: CPT | Mod: LEFT SIDE

## 2024-02-26 RX ORDER — NAPROXEN 500 MG/1
500 TABLET ORAL 2 TIMES DAILY PRN
Qty: 30 TABLET | Refills: 0 | Status: SHIPPED | OUTPATIENT
Start: 2024-02-26 | End: 2024-02-26 | Stop reason: SINTOL

## 2024-02-26 RX ORDER — IBUPROFEN 600 MG/1
600 TABLET ORAL EVERY 6 HOURS PRN
Qty: 30 TABLET | Refills: 0 | Status: SHIPPED | OUTPATIENT
Start: 2024-02-26 | End: 2024-03-04

## 2024-02-26 RX ORDER — KETOROLAC TROMETHAMINE 15 MG/ML
15 INJECTION, SOLUTION INTRAMUSCULAR; INTRAVENOUS ONCE
Status: COMPLETED | OUTPATIENT
Start: 2024-02-26 | End: 2024-02-26

## 2024-02-26 RX ADMIN — KETOROLAC TROMETHAMINE 15 MG: 15 INJECTION, SOLUTION INTRAMUSCULAR; INTRAVENOUS at 14:51

## 2024-02-26 ASSESSMENT — PAIN DESCRIPTION - ORIENTATION: ORIENTATION: LEFT

## 2024-02-26 ASSESSMENT — LIFESTYLE VARIABLES
EVER FELT BAD OR GUILTY ABOUT YOUR DRINKING: NO
HAVE PEOPLE ANNOYED YOU BY CRITICIZING YOUR DRINKING: NO
EVER HAD A DRINK FIRST THING IN THE MORNING TO STEADY YOUR NERVES TO GET RID OF A HANGOVER: NO
HAVE YOU EVER FELT YOU SHOULD CUT DOWN ON YOUR DRINKING: NO

## 2024-02-26 ASSESSMENT — PAIN SCALES - GENERAL
PAINLEVEL_OUTOF10: 4
PAINLEVEL_OUTOF10: 7

## 2024-02-26 ASSESSMENT — COLUMBIA-SUICIDE SEVERITY RATING SCALE - C-SSRS
6. HAVE YOU EVER DONE ANYTHING, STARTED TO DO ANYTHING, OR PREPARED TO DO ANYTHING TO END YOUR LIFE?: NO
1. IN THE PAST MONTH, HAVE YOU WISHED YOU WERE DEAD OR WISHED YOU COULD GO TO SLEEP AND NOT WAKE UP?: NO
2. HAVE YOU ACTUALLY HAD ANY THOUGHTS OF KILLING YOURSELF?: NO

## 2024-02-26 ASSESSMENT — PAIN DESCRIPTION - LOCATION: LOCATION: LEG

## 2024-02-26 ASSESSMENT — PAIN - FUNCTIONAL ASSESSMENT: PAIN_FUNCTIONAL_ASSESSMENT: 0-10

## 2024-02-26 NOTE — DISCHARGE INSTRUCTIONS
Please follow-up with your primary care provider in the next 2-3 days.  We also provided with information to follow-up with an orthopedic surgeon who will help you determine the cause of your pain if it does not get better with conservative treatment.  Please return to the emergency department if you have significant worsening of pain or weakness or inability to walk.  Sending some naproxen to your pharmacy which you can take twice daily as needed for pain.  Can also continue to take Tylenol as needed for pain.  You can ice your hip if that helps alleviate the pain as well.

## 2024-02-26 NOTE — ED PROVIDER NOTES
EMERGENCY DEPARTMENT ENCOUNTER      Pt Name: Collin Lazar  MRN: [id number]  Birthdate 1950  Date of evaluation: 2/26/2024  Provider: Laurent Grande DO    CHIEF COMPLAINT       Chief Complaint   Patient presents with    Leg Pain     Since water therapy 2 wks ago         HISTORY OF PRESENT ILLNESS    Collin is a 73-year-old female with left hip pain worse with walking and standing for over a week.  Denies any trauma to the area.  Hip pain both in the groin and back of the hip and buttocks.  Pain radiates down back of leg to her calf with walking.  Started after doing water aerobics putting hips deep into flexion.  No fevers or chills.  Still able to ambulate but with pain.  Sensation is intact.  She said that she was doing water therapy putting her legs through extensive flexion and extension felt the pain.  She has history of hypertension and rheumatoid arthritis no other history of trauma or pathology at this hip.  She is stable to ambulate and does not have any weakness.  No loss of sensation.  No bowel or bladder incontinence.  No numbness or tingling in her groin.  Otherwise not having any chest pain or shortness of breath, no dizziness or headaches, no loss of balance.  Ambulate independently without assistance and lives.          Nursing Notes were reviewed.    PAST MEDICAL HISTORY     Past Medical History:   Diagnosis Date    Personal history of other diseases of the circulatory system     History of hypertension    Personal history of other diseases of the musculoskeletal system and connective tissue     History of rheumatoid arthritis    Personal history of other malignant neoplasm of bronchus and lung     History of malignant neoplasm of lung    Systemic lupus erythematosus, unspecified (CMS/HCC)     Lupus         SURGICAL HISTORY       Past Surgical History:   Procedure Laterality Date    MR HEAD ANGIO WO IV CONTRAST  9/4/2020    MR HEAD ANGIO WO IV CONTRAST 9/4/2020 [facility]GENCY LEGAC[facility]     OTHER SURGICAL HISTORY  01/16/2021    Lumpectomy    OTHER SURGICAL HISTORY  01/28/2022    Hysterectomy    US ASPIRATION INJECTION INTERMEDIATE JOINT  6/10/2021    US ASPIRATION INJECTION INTERMEDIATE JOINT 6/10/2021 [facility]         CURRENT MEDICATIONS       Previous Medications    LIDOCAINE (LIDODERM) 5 % PATCH    Place 1 patch over 12 hours on the skin once daily. Remove & discard patch within 12 hours or as directed by MD.    TIZANIDINE (ZANAFLEX) 2 MG TABLET    Take 1 tablet (2 mg) by mouth every 6 hours for 10 days. Do not take if planning to drive or operate heavy machinery       ALLERGIES     Patient has no known allergies.    FAMILY HISTORY     No family history on file.       SOCIAL HISTORY       Social History     Socioeconomic History    Marital status:      Spouse name: Not on file    Number of children: Not on file    Years of education: Not on file    Highest education level: Not on file   Occupational History    Not on file   Tobacco Use    Smoking status: Former     Types: Cigarettes    Smokeless tobacco: Not on file   Substance and Sexual Activity    Alcohol use: Never    Drug use: Never    Sexual activity: Never   Other Topics Concern    Not on file   Social History Narrative    Not on file     Social Determinants of Health     Financial Resource Strain: Not on file   Food Insecurity: Not on file   Transportation Needs: Not on file   Physical Activity: Not on file   Stress: Not on file   Social Connections: Not on file   Intimate Partner Violence: Not on file   Housing Stability: Not on file       SCREENINGS                        PHYSICAL EXAM    (up to 7 for level 4, 8 or more for level 5)     ED Triage Vitals [02/26/24 1326]   Temperature Heart Rate Respirations BP   36.2 °C (97.2 °F) 95 19 152/72      Pulse Ox Temp Source Heart Rate Source Patient Position   100 % Temporal Monitor --      BP Location FiO2 (%)     -- --       Physical Exam  Constitutional:       General: She  is not in acute distress.     Appearance: She is not toxic-appearing.   HENT:      Nose: Nose normal.      Mouth/Throat:      Mouth: Mucous membranes are moist.      Pharynx: Oropharynx is clear.   Cardiovascular:      Rate and Rhythm: Normal rate and regular rhythm.      Pulses: Normal pulses.      Heart sounds: Normal heart sounds.   Pulmonary:      Effort: Pulmonary effort is normal.      Breath sounds: Normal breath sounds.   Abdominal:      General: Abdomen is flat.      Palpations: Abdomen is soft.      Tenderness: There is no abdominal tenderness.   Musculoskeletal:         General: Tenderness present. No swelling or deformity.      Right lower leg: No edema.      Left lower leg: No edema.        Legs:       Comments: Pain which patient localizes into the left groin as well is in the left buttocks.  Some tenderness in groin with logrolling of the leg.  Range of motion is intact and patient is able to ambulate.  No trochanteric tenderness.  Strength is intact bilaterally.   Skin:     General: Skin is warm and dry.      Capillary Refill: Capillary refill takes less than 2 seconds.   Neurological:      Mental Status: She is alert.          DIAGNOSTIC RESULTS     LABS:  Labs Reviewed - No data to display    All other labs were within normal range or not returned as of this dictation.    Imaging  XR hip left with pelvis when performed 4+ views    (Results Pending)        Procedures  Procedures     EMERGENCY DEPARTMENT COURSE/MDM:     Diagnoses as of 02/26/24 1605   Left hip pain        Medical Decision Making  Collin is a well-appearing 73-year-old female presenting with left hip pain.    On initial presentation patient is hemodynamically stable no acute distress.  She is not complaining of any dizziness or headaches, chest pain, weakness,, but is complaining of hip pain for the past week located left side in her buttocks as well as in her groin.  On exam patient does have tenderness to palpation of left  piriformis muscle.  She also has tenderness in her groin with logroll.  Range of motion is grossly intact as well as strength being intact bilaterally with patient able to ambulate.  However given positive pain with logroll ordered a x-ray hip and pelvis which showed no acute fractures.  Patient was given Toradol for pain with marked improvement per patient.  Suspect that patient's pain is probably musculoskeletal secondary to her piriformis, however since she is having the pain that is in her groin, will give the patient a referral to orthopedics to follow-up with outpatient if further evaluation is needed.  In the meantime we will prescribe the patient naproxen which she can  as her pharmacy and take as needed for pain.  Patient is also to follow-up with her primary care physician if she is requiring physical therapy to be determined by her primary care provider.  Return precautions discussed with patient and she was agreeable with plan.  Patient to be discharged home in stable condition.        Patient and or family in agreement and understanding of treatment plan.  All questions answered.      I reviewed the case with the attending ED physician. The attending ED physician agrees with the plan. Patient and/or patient´s representative was counseled regarding labs, imaging, likely diagnosis, and plan. All questions were answered.    ED Medications administered this visit:    Medications   ketorolac (Toradol) injection 15 mg (has no administration in time range)       New Prescriptions from this visit:    New Prescriptions    No medications on file       Follow-up:  No follow-up provider specified.      Final Impression: No diagnosis found.      (Please note that portions of this note were completed with a voice recognition program.  Efforts were made to edit the dictations but occasionally words are mis-transcribed.)     Laurent Grande,   Resident  02/26/24 2415

## 2024-03-10 ENCOUNTER — APPOINTMENT (OUTPATIENT)
Dept: CARDIOLOGY | Facility: HOSPITAL | Age: 74
End: 2024-03-10
Payer: MEDICARE

## 2024-03-10 ENCOUNTER — HOSPITAL ENCOUNTER (EMERGENCY)
Facility: HOSPITAL | Age: 74
Discharge: HOME | End: 2024-03-10
Attending: STUDENT IN AN ORGANIZED HEALTH CARE EDUCATION/TRAINING PROGRAM
Payer: MEDICARE

## 2024-03-10 VITALS
HEART RATE: 75 BPM | RESPIRATION RATE: 20 BRPM | HEIGHT: 67 IN | WEIGHT: 180 LBS | OXYGEN SATURATION: 95 % | SYSTOLIC BLOOD PRESSURE: 163 MMHG | DIASTOLIC BLOOD PRESSURE: 71 MMHG | TEMPERATURE: 98.1 F | BODY MASS INDEX: 28.25 KG/M2

## 2024-03-10 DIAGNOSIS — M79.605 PAIN OF LEFT LOWER EXTREMITY: ICD-10-CM

## 2024-03-10 DIAGNOSIS — M79.605 PAIN IN LEFT LEG: Primary | ICD-10-CM

## 2024-03-10 PROCEDURE — 99284 EMERGENCY DEPT VISIT MOD MDM: CPT | Mod: 25

## 2024-03-10 PROCEDURE — 93971 EXTREMITY STUDY: CPT | Performed by: SURGERY

## 2024-03-10 PROCEDURE — 2500000001 HC RX 250 WO HCPCS SELF ADMINISTERED DRUGS (ALT 637 FOR MEDICARE OP)

## 2024-03-10 PROCEDURE — 2500000005 HC RX 250 GENERAL PHARMACY W/O HCPCS

## 2024-03-10 PROCEDURE — 93971 EXTREMITY STUDY: CPT

## 2024-03-10 RX ORDER — LIDOCAINE 560 MG/1
1 PATCH PERCUTANEOUS; TOPICAL; TRANSDERMAL DAILY
Status: DISCONTINUED | OUTPATIENT
Start: 2024-03-10 | End: 2024-03-10

## 2024-03-10 RX ORDER — ACETAMINOPHEN 325 MG/1
650 TABLET ORAL ONCE
Status: COMPLETED | OUTPATIENT
Start: 2024-03-10 | End: 2024-03-10

## 2024-03-10 RX ORDER — LIDOCAINE 50 MG/G
1 PATCH TOPICAL DAILY
Qty: 1 PATCH | Refills: 0 | Status: SHIPPED | OUTPATIENT
Start: 2024-03-10 | End: 2024-04-02 | Stop reason: HOSPADM

## 2024-03-10 RX ORDER — LIDOCAINE 560 MG/1
1 PATCH PERCUTANEOUS; TOPICAL; TRANSDERMAL ONCE
Status: DISCONTINUED | OUTPATIENT
Start: 2024-03-10 | End: 2024-03-10 | Stop reason: HOSPADM

## 2024-03-10 RX ORDER — IBUPROFEN 400 MG/1
400 TABLET ORAL ONCE
Status: COMPLETED | OUTPATIENT
Start: 2024-03-10 | End: 2024-03-10

## 2024-03-10 RX ADMIN — IBUPROFEN 400 MG: 400 TABLET, FILM COATED ORAL at 18:25

## 2024-03-10 RX ADMIN — LIDOCAINE 1 PATCH: 4 PATCH TOPICAL at 20:06

## 2024-03-10 RX ADMIN — ACETAMINOPHEN 650 MG: 325 TABLET ORAL at 18:25

## 2024-03-10 ASSESSMENT — COLUMBIA-SUICIDE SEVERITY RATING SCALE - C-SSRS
6. HAVE YOU EVER DONE ANYTHING, STARTED TO DO ANYTHING, OR PREPARED TO DO ANYTHING TO END YOUR LIFE?: NO
2. HAVE YOU ACTUALLY HAD ANY THOUGHTS OF KILLING YOURSELF?: NO
1. IN THE PAST MONTH, HAVE YOU WISHED YOU WERE DEAD OR WISHED YOU COULD GO TO SLEEP AND NOT WAKE UP?: NO

## 2024-03-10 ASSESSMENT — PAIN SCALES - GENERAL
PAINLEVEL_OUTOF10: 8
PAINLEVEL_OUTOF10: 9
PAINLEVEL_OUTOF10: 8

## 2024-03-10 ASSESSMENT — LIFESTYLE VARIABLES
HAVE PEOPLE ANNOYED YOU BY CRITICIZING YOUR DRINKING: NO
HAVE YOU EVER FELT YOU SHOULD CUT DOWN ON YOUR DRINKING: NO
EVER FELT BAD OR GUILTY ABOUT YOUR DRINKING: NO
EVER HAD A DRINK FIRST THING IN THE MORNING TO STEADY YOUR NERVES TO GET RID OF A HANGOVER: NO

## 2024-03-10 ASSESSMENT — PAIN DESCRIPTION - ORIENTATION
ORIENTATION: LEFT

## 2024-03-10 ASSESSMENT — PAIN - FUNCTIONAL ASSESSMENT
PAIN_FUNCTIONAL_ASSESSMENT: 0-10

## 2024-03-10 ASSESSMENT — PAIN DESCRIPTION - DESCRIPTORS: DESCRIPTORS: SHARP

## 2024-03-10 ASSESSMENT — PAIN DESCRIPTION - PAIN TYPE: TYPE: ACUTE PAIN

## 2024-03-10 ASSESSMENT — PAIN DESCRIPTION - LOCATION
LOCATION: HIP
LOCATION: GROIN
LOCATION: LEG

## 2024-03-10 ASSESSMENT — PAIN DESCRIPTION - FREQUENCY: FREQUENCY: CONSTANT/CONTINUOUS

## 2024-03-10 NOTE — ED NOTES
1816 Elo from [facility] will be in for order     Shiela Jimenez, EMT  03/10/24 1816

## 2024-03-10 NOTE — ED PROVIDER NOTES
EMERGENCY DEPARTMENT ENCOUNTER      Pt Name: Collin Lazar  MRN: [id number]  Birthdate 1950  Date of evaluation: 3/10/2024  Provider: Lorenza Barrios MD    CHIEF COMPLAINT       Chief Complaint   Patient presents with    Groin Pain     Pt reports x3 weeks. Was seen last week and xray done. Denies back pain.            HISTORY OF PRESENT ILLNESS    The patient is a 73-year-old female who comes to the emergency department due to left-sided leg pain that originates in the posterior upper medial thigh close to her groin.  The patient states that she has been having this pain for the past 3 weeks.  The patient states that the pain started off very light but has since been getting progressively worse.  The patient says that she walked into the emergency department today with a 6 out of 10 pain in her left leg, but has now progressed to a 9 out of 10.  The patient states that she has had sciatica in the past before and this leg pain feels nothing like sciatica.  The patient says that she is concerned that this leg pain is due to a blood clot in her leg.  The patient is requesting a ultrasound leg to look for blood clots.  Patient states that she has gotten an x-ray in the recent past of her leg, and that the x-ray showed no signs of any broken bones.  The patient's past medical history includes sciatica, lupus, rheumatoid arthritis, hypertension, and lung cancer.  The patient has had surgery for lung cancer to remove lymph nodes according to the patient.  The patient claims to use a lidocaine patch.  The patient claims that she is allergic to naproxen, and naproxen gives her itches.  The patient denies the use of tobacco, alcohol, or illicit drugs.  The patient denies any fever, vision changes, sore throat, chest pain, shortness of breath, cough, nausea, vomiting, diarrhea, urinary problems, neck pain, back pain, headache, fainting, or dizziness.      History provided by:  Patient   used: No         Nursing Notes were reviewed.    PAST MEDICAL HISTORY     Past Medical History:   Diagnosis Date    Personal history of other diseases of the circulatory system     History of hypertension    Personal history of other diseases of the musculoskeletal system and connective tissue     History of rheumatoid arthritis    Personal history of other malignant neoplasm of bronchus and lung     History of malignant neoplasm of lung    Systemic lupus erythematosus, unspecified (CMS/HCC)     Lupus         SURGICAL HISTORY       Past Surgical History:   Procedure Laterality Date    MR HEAD ANGIO WO IV CONTRAST  9/4/2020    MR HEAD ANGIO WO IV CONTRAST 9/4/2020 [facility]    OTHER SURGICAL HISTORY  01/16/2021    Lumpectomy    OTHER SURGICAL HISTORY  01/28/2022    Hysterectomy    US ASPIRATION INJECTION INTERMEDIATE JOINT  6/10/2021    US ASPIRATION INJECTION INTERMEDIATE JOINT 6/10/2021 [facility]         CURRENT MEDICATIONS       Previous Medications    LIDOCAINE (LIDODERM) 5 % PATCH    Place 1 patch over 12 hours on the skin once daily. Remove & discard patch within 12 hours or as directed by MD.    TIZANIDINE (ZANAFLEX) 2 MG TABLET    Take 1 tablet (2 mg) by mouth every 6 hours for 10 days. Do not take if planning to drive or operate heavy machinery       ALLERGIES     Naproxen    FAMILY HISTORY     No family history on file.       SOCIAL HISTORY       Social History     Socioeconomic History    Marital status:      Spouse name: None    Number of children: None    Years of education: None    Highest education level: None   Occupational History    None   Tobacco Use    Smoking status: Former     Types: Cigarettes    Smokeless tobacco: Never   Substance and Sexual Activity    Alcohol use: Never    Drug use: Never    Sexual activity: Never   Other Topics Concern    None   Social History Narrative    None     Social Determinants of Health     Financial Resource Strain: Not on file   Food  Insecurity: Not on file   Transportation Needs: Not on file   Physical Activity: Not on file   Stress: Not on file   Social Connections: Not on file   Intimate Partner Violence: Not on file   Housing Stability: Not on file       SCREENINGS                        PHYSICAL EXAM    (up to 7 for level 4, 8 or more for level 5)     ED Triage Vitals [03/10/24 1708]   Temperature Heart Rate Respirations BP   36.7 °C (98.1 °F) 93 20 (!) 175/91      Pulse Ox Temp Source Heart Rate Source Patient Position   97 % Temporal Monitor Sitting      BP Location FiO2 (%)     Right arm --       Physical Exam  Constitutional:       General: She is not in acute distress.     Appearance: Normal appearance. She is normal weight. She is not ill-appearing or toxic-appearing.   HENT:      Head: Normocephalic and atraumatic.      Nose: Nose normal. No congestion or rhinorrhea.   Eyes:      General: No scleral icterus.        Right eye: No discharge.         Left eye: No discharge.      Extraocular Movements: Extraocular movements intact.      Conjunctiva/sclera: Conjunctivae normal.   Cardiovascular:      Rate and Rhythm: Normal rate and regular rhythm.      Pulses: Normal pulses.      Heart sounds: Normal heart sounds. No murmur heard.     No friction rub. No gallop.   Pulmonary:      Effort: Pulmonary effort is normal. No respiratory distress.      Breath sounds: Normal breath sounds. No stridor. No wheezing, rhonchi or rales.   Chest:      Chest wall: No tenderness.   Abdominal:      General: There is no distension.      Palpations: Abdomen is soft.      Tenderness: There is no abdominal tenderness. There is no right CVA tenderness, left CVA tenderness, guarding or rebound.   Musculoskeletal:         General: No swelling, tenderness, deformity or signs of injury. Normal range of motion.      Cervical back: Normal range of motion and neck supple. No rigidity or tenderness.      Right lower leg: No edema.      Left lower leg: No edema.    Lymphadenopathy:      Cervical: No cervical adenopathy.   Skin:     General: Skin is warm.      Coloration: Skin is not jaundiced or pale.      Findings: No bruising, erythema, lesion or rash.   Neurological:      Mental Status: She is alert.      Sensory: Sensory deficit (The patient has pain and sometimes numbness in the posterior aspect of her right leg the pain and numbness starts in the posterior medial upper thigh and radiates down the posterior aspect of her left leg down to her feet.) present.   Psychiatric:         Mood and Affect: Mood normal.         Behavior: Behavior normal.         Thought Content: Thought content normal.         Judgment: Judgment normal.          DIAGNOSTIC RESULTS     LABS:  Labs Reviewed - No data to display    All other labs were within normal range or not returned as of this dictation.    Imaging  No orders to display        Procedures  Procedures     EMERGENCY DEPARTMENT COURSE/MDM:         Medical Decision Making  The patient received acetaminophen and ibuprofen in the emergency department for her pain control.  The patient received a vascular duplex ultrasound of the left lower extremity to look for signs of deep venous thrombosis that might be causing her leg pain.  The ultrasound study did not show any signs of deep venous thrombosis or blood clots.  The patient was reassured of the results.  No additional imaging studies were necessary because the patient denies having any trauma.  The patient also received a previous x-ray which was negative for any bone fractures in the region of her left lower extremity.  The patient was prescribed a lidocaine patch to be placed on her medial posterior left thigh for pain relief.  The patient was discharged home with instructions to follow-up with her primary care provider for any general symptoms, a pain management specialist for her leg pain, and a orthopedic surgeon for management of any muscular skeletal issues that may be causing  her leg pain.    Patient and or family in agreement and understanding of treatment plan.  All questions answered.      I reviewed the case with the attending ED physician. The attending ED physician agrees with the plan. Patient and/or patient´s representative was counseled regarding labs, imaging, likely diagnosis, and plan. All questions were answered.    ED Medications administered this visit:  Medications - No data to display    New Prescriptions from this visit:    New Prescriptions    No medications on file       Follow-up:  No follow-up provider specified.      Final Impression: No diagnosis found.      (Please note that portions of this note were completed with a voice recognition program.  Efforts were made to edit the dictations but occasionally words are mis-transcribed.)     Lorenza Barrios MD  Resident  03/10/24 3630

## 2024-03-10 NOTE — DISCHARGE INSTRUCTIONS
Thank you for visiting the [facility] emergency department please remember to take your lidocaine cane patch before discharge.  Please take an additional lidocaine patch from your pharmacy as prescribed for any additional pain symptoms.  Please follow-up with your primary care provider, pain management provider, and orthopedic surgeon in 3 days as assigned to you.    Please come back to the emergency department if you experience back pain with groin numbness and problems with urination associated with your leg pain, as the symptoms together may be a sign of gout a Cauda Equina syndrome.

## 2024-03-13 ENCOUNTER — HOSPITAL ENCOUNTER (OUTPATIENT)
Dept: RADIOLOGY | Facility: HOSPITAL | Age: 74
Discharge: HOME | End: 2024-03-13
Payer: MEDICARE

## 2024-03-13 ENCOUNTER — OFFICE VISIT (OUTPATIENT)
Dept: PAIN MEDICINE | Facility: CLINIC | Age: 74
End: 2024-03-13
Payer: MEDICARE

## 2024-03-13 VITALS
OXYGEN SATURATION: 97 % | SYSTOLIC BLOOD PRESSURE: 156 MMHG | HEART RATE: 78 BPM | RESPIRATION RATE: 20 BRPM | DIASTOLIC BLOOD PRESSURE: 73 MMHG

## 2024-03-13 DIAGNOSIS — M46.1 SACROILIITIS (CMS-HCC): ICD-10-CM

## 2024-03-13 DIAGNOSIS — M46.1 SACROILIITIS (CMS-HCC): Primary | ICD-10-CM

## 2024-03-13 PROCEDURE — 99204 OFFICE O/P NEW MOD 45 MIN: CPT | Performed by: PAIN MEDICINE

## 2024-03-13 PROCEDURE — 99214 OFFICE O/P EST MOD 30 MIN: CPT | Performed by: PAIN MEDICINE

## 2024-03-13 PROCEDURE — 72100 X-RAY EXAM L-S SPINE 2/3 VWS: CPT | Performed by: RADIOLOGY

## 2024-03-13 PROCEDURE — 1125F AMNT PAIN NOTED PAIN PRSNT: CPT | Performed by: PAIN MEDICINE

## 2024-03-13 PROCEDURE — 72100 X-RAY EXAM L-S SPINE 2/3 VWS: CPT

## 2024-03-13 PROCEDURE — 1159F MED LIST DOCD IN RCRD: CPT | Performed by: PAIN MEDICINE

## 2024-03-13 PROCEDURE — G2211 COMPLEX E/M VISIT ADD ON: HCPCS | Performed by: PAIN MEDICINE

## 2024-03-13 PROCEDURE — 1036F TOBACCO NON-USER: CPT | Performed by: PAIN MEDICINE

## 2024-03-13 ASSESSMENT — PAIN DESCRIPTION - DESCRIPTORS: DESCRIPTORS: ACHING

## 2024-03-13 ASSESSMENT — PAIN SCALES - GENERAL: PAINLEVEL: 8

## 2024-03-13 ASSESSMENT — PAIN - FUNCTIONAL ASSESSMENT: PAIN_FUNCTIONAL_ASSESSMENT: 0-10

## 2024-03-13 NOTE — PROGRESS NOTES
Subjective   Collin Lazar is a 73 y.o. female who presents for evaluation of her lumbar/ sacroiliac pain.  The pain is located in the left hip area and radiates to left leg.  The pain started 3 weeks ago. The pt has been in the ER twice for imaging r/o analy ok and no blood clots in the left leg. She was then referred to pain mgt. Physical therapy then to be started in the next few weeks.   Past Medical History:   Diagnosis Date    Personal history of other diseases of the circulatory system     History of hypertension    Personal history of other diseases of the musculoskeletal system and connective tissue     History of rheumatoid arthritis    Personal history of other malignant neoplasm of bronchus and lung     History of malignant neoplasm of lung    Systemic lupus erythematosus, unspecified (CMS/HCC)     Lupus     Past Surgical History:   Procedure Laterality Date    MR HEAD ANGIO WO IV CONTRAST  9/4/2020    MR HEAD ANGIO WO IV CONTRAST 9/4/2020 [facility]    OTHER SURGICAL HISTORY  01/16/2021    Lumpectomy    OTHER SURGICAL HISTORY  01/28/2022    Hysterectomy    US ASPIRATION INJECTION INTERMEDIATE JOINT  6/10/2021    US ASPIRATION INJECTION INTERMEDIATE JOINT 6/10/2021 [facility]       I have reviewed the nurses notes and am aware of family/social history.     Review of Systems    Objective   Physical Exam    ASSESSMENT:     PLAN:   Discussed treatment plan with patient.   Call or return to clinic prn if these symptoms worsen or fail to improve as anticipated.     Carrie Remy RN

## 2024-03-13 NOTE — H&P
"History Of Present Illness  Collin Lazar is a 73 y.o. female presents for evaluation of her lumbar/ sacroiliac pain.  The pain is located in the left hip area and radiates to left leg.  The pain started 3 weeks ago. The pt has been in the ER twice for imaging r/o analy ok and no blood clots in the left leg. She was then referred to pain mgt. Physical therapy then to be started in the next few weeks.    Patient is giving the history of chronic issue in her back that were under the care of of at [facility] and she received a \"burning of the nerve\" roughly 5 years ago at that time she described as being extremely beneficial.  She is currently on lidocaine patch and Motrin and Tylenol she does not feel that the medication is helping with the pain.  Pain seems to be aggravated by standing.  Pain is improved in the sitting position.  Was tried in the past on gabapentin and that made her forgetful and she stopped using the pill.     Pain Assessment as of today    Pain Assessment   Pain Assessment 0-10   Pain Type Acute pain   Pain Location Hip   Pain Orientation Left   Pain Radiating Towards left lumbar and into the hip down the whole leg   Pain Descriptors Aching   Pain Frequency Constant/continuous   Pain Onset Ongoing   Clinical Progression Not changed     Patient Active Problem List   Diagnosis    Urge incontinence of urine       Past Medical History  Past Medical History:   Diagnosis Date    Personal history of other diseases of the circulatory system     History of hypertension    Personal history of other diseases of the musculoskeletal system and connective tissue     History of rheumatoid arthritis    Personal history of other malignant neoplasm of bronchus and lung     History of malignant neoplasm of lung    Systemic lupus erythematosus, unspecified (CMS/HCC)     Lupus       Surgical History  Past Surgical History:   Procedure Laterality Date    MR HEAD ANGIO WO IV CONTRAST  9/4/2020    MR HEAD ANGIO WO " IV CONTRAST 9/4/2020 [facility]    OTHER SURGICAL HISTORY  01/16/2021    Lumpectomy    OTHER SURGICAL HISTORY  01/28/2022    Hysterectomy    US ASPIRATION INJECTION INTERMEDIATE JOINT  6/10/2021    US ASPIRATION INJECTION INTERMEDIATE JOINT 6/10/2021 [facility]        Social History  She reports that she has quit smoking. Her smoking use included cigarettes. She has never used smokeless tobacco. She reports that she does not drink alcohol and does not use drugs.    Family History  No family history on file.     Allergies  Naproxen    Review of Systems   12 Systems have been reviewed as follows.   Constitutional: Fever, weight gain, weight loss, appetite change, night sweats, fatigue, chills.  Eyes : blurry, double vision, vision, loss, tearing, redness, pain, sensitivity to light, glaucoma.  Ears, nose, mouth, and throat: Hearing loss, ringing in the ears, ear pain, nasal congestion, nasal drainage, nosebleeds, mouth, throat, irritation tooth problem.  Cardiovascular :chest pain, pressure, heart tracing,palpaitations , sweating, leg swelling, high or low blood pressure  Pulmonary: Cough, yellow or green sputum, blood and sputum, shortness of breath, wheezing  Gastrointestinal: Nause, vomiting, diarrhea, constipation, pain, blood in stool, or vomitus, heartburn, difficulty swallowing  Genitourinary: incontinence, abnormal bleeding, abnormal discharge, urinary frequency, urinary hesitancy, pain, impotence sexual problem, infection, urinary retention  Musculoskeletal: Pain, stiffness, joint, redness or warmth, arthritis, back pain, weakness, muscle wasting, sprain or fracture  Neuro: Weight weakness, dizziness, change in voice, change in taste change in vision, change in hearing, loss, or change of sensation, trouble walking, balance problems coordination problems, shaking, speech problem  Endocrine , cold or heat intolerance, blood sugar problem, weight gain or loss missed periods hot flashes,  sweats, change in body hair, change in libido, increased thirst, increased urination  Heme/lymph: Swelling, bleeding, problem anemia, bruising, enlarged lymph nodes  Allergic/immunologic: H. plus nasal drip, watery itchy eyes, nasal drainage, immunosuppressed  The above, were reviewed and noted negative except as noted.    Physical Exam   Vital signs reviewed, documented in chart     General:  Appears well, does not look in any major distress  Alert    HEENT:  Head atraumatic  Eyes normal inspection  PERRL  Normal ENT inspection  No signs of dehydration    NECK:  Normal inspection  Range of motion within normal     RESPIRATORY:  No respiratory distress    CVS:  Heart rate and rhythm regular    ABDOMEN/GI  Soft  Non-tender  No distention  No organomegaly      BACK:  Normal inspection, flexion and extension within normal limit   no tenderness upon the palpation of the facet joint  Left Si joints tender to palpations   Positive Lazaro sign ,positive SI distraction test, positive compression test and positive thigh thrush test      EXTREMITIES:  Non-Tender  Full ROM  Normal appearance  No Pedal edema  Power symmetrical , sensory examination preserved.    NEURO:  Alert and oriented X 3  CNS normal as tested without focal neurological deficit   Sensation normal  Motor normal  reflexes diminished in the lower extremity    PSYCH:  Mood normal  Affect normal    SKIN:  Color normal  No rash  Warm  Dry  no sign of skin marking supportive of IV drug usage /abuse.    Last Recorded Vitals  Blood pressure 156/73, pulse 78, resp. rate 20, SpO2 97 %.    Relevant Results        FINDINGS:  AP view of the pelvis and AP and frog-leg views of the left hip were  obtained. There is no fracture or dislocation. Joint space appears  preserved. Mild marginal hypertrophy is present. Sacroiliac joints  are symmetric and symphysis pubis is normal in width.      Bilateral iliac arterial calcifications are visible.      IMPRESSION:  No acute  osseous abnormality          MACRO:  None         Assessment/Plan       73 years old with history and physical examination supportive of left sacroiliitis and chronic back pain    Plan  Advised the patient about the different modalities available for the treatment of her condition  The most applicable treatment options considered and discussed with the patient at this time, including a combination of physical therapy approaches, psychological/psychiatric approaches, pharmacologic management, interventional procedures and pain management surgeries (such as spinal cord stimulation and intrathecal pump placement. Risk of complications and/or morbidity and mortality is high given the acute and chronic pain may pose a threat to life and bodily functions if undertreated, poorly treated or with failure to maintain adequate and timely follow-up. Given the serious and fluctuating nature of pain (with extensive considerations whenever pain changes, worsens and even if it improves), there always remains the possibility of prolonged functional impairment requiring constant patient re-assessment and high level medical decision making. The amount and complexity of data reviewed is high given the patient labs, radiology reports and other tests were obtained and reviewed (summarized as applicable). Pertinent positive and negative findings were considered in medical decision making.     The patient was counseled regarding diagnostic results, instructions for management, risk factor reductions, prognosis, patient and family education, impressions, risks and benefits of treatment options and importance of compliance with treatment.        The level of clinical decision making in this office visit, is high, given the high risks of complications with the morbidity and mortality due to the fact that acute and chronic pain may pose a threat to the life and bodily function, if under treated, poorly treated, or with failure to maintain  adequate treatment and timely medical follow up. Additionally over treatment has its own set of complications including overdosing on the pain medications and also the habit forming effects of the medications used to treat chronic painful conditions including therapeutic classes classified as dangerous medications. Given the serious and fluctuating nature of pain with extensive consideration for whenever pain changes, there is always the risk of prolonged functional impairment requiring close patient assessment and reassessment and high level medical decision making at every office visit. The amount and complexity of data reviewed is high given the patient clinical presentation, labs,  data, radiology reports, and other tests as above. Pertinent data whether positive or negative were taken in consideration in the process of making this high level medical decision.   Factors to be considered the chronicity and the severity of the symptoms and signs associated comorbidity and differential diagnosis motivation to get in treatment response to treatment adherence to treatment recommendation and using skills.  The patient's verbal consent was obtained.  Discussed the pharmacological treatment versus the nonpharmacological treatment for current pain condition will continue the combination of the current treatment.  The typical short and long-term side effects of the proposed medication regimen including contraindications and clinically significant interactions were discussed with the patient.  Examples of side effects include but are not limited to sedation overdose with the usage of the opiate constipation mood changes sedation impaired judgment.  I advised the patient not to drive or drink while taking these medication.  The patient was advised to stop taking any medication if they have acute severe side effects and to reach out to our office with question the patient denied any contraindication to this treatment.   Targeted symptoms and signs possible therapeutic benefits side effects and risks were discussed.    I recommend for the patient to proceed with the physical therapy and I will obtaining an x-ray for the lumbar spine area and I recommended for the patient a left sacroiliac joint injection to be performed under fluoroscopic guidance with injection of contrast material to confirm needle placement I will reevaluate the patient after the performance of the injection for further recommendation as her case progress patient verbalized understanding and agreement with the plan benefits and risk were discussed and she would like to proceed       The above clinical summary has been dictated with voice recognition software. It has not been proofread for grammatical errors, typographical mistakes, or other semantic inconsistencies.    Thank you for visiting our office today. It was our pleasure to take part in your healthcare.     Please do not hesitate to contact the pain clinic after your visit with any questions or concerns at  M-F 8-4 pm       Aurora Harding M.D.  Medical Director , Division of Pain Medicine [facility]   of Anesthesiology and Pain Medicine  [facility] [employer] [place] [address]     Office: [phone]  Fax: [phone]            Aurora Harding MD

## 2024-04-01 ENCOUNTER — HOSPITAL ENCOUNTER (OUTPATIENT)
Facility: HOSPITAL | Age: 74
Setting detail: OBSERVATION
Discharge: HOME | DRG: 311 | End: 2024-04-02
Attending: STUDENT IN AN ORGANIZED HEALTH CARE EDUCATION/TRAINING PROGRAM | Admitting: STUDENT IN AN ORGANIZED HEALTH CARE EDUCATION/TRAINING PROGRAM
Payer: MEDICARE

## 2024-04-01 ENCOUNTER — APPOINTMENT (OUTPATIENT)
Dept: CARDIOLOGY | Facility: HOSPITAL | Age: 74
DRG: 311 | End: 2024-04-01
Payer: MEDICARE

## 2024-04-01 ENCOUNTER — APPOINTMENT (OUTPATIENT)
Dept: RADIOLOGY | Facility: HOSPITAL | Age: 74
DRG: 311 | End: 2024-04-01
Payer: MEDICARE

## 2024-04-01 DIAGNOSIS — I20.89 ANGINA AT REST (CMS-HCC): Primary | ICD-10-CM

## 2024-04-01 DIAGNOSIS — R07.9 CHEST PAIN, UNSPECIFIED: ICD-10-CM

## 2024-04-01 LAB
ALBUMIN SERPL BCP-MCNC: 4 G/DL (ref 3.4–5)
ALP SERPL-CCNC: 74 U/L (ref 33–136)
ALT SERPL W P-5'-P-CCNC: 40 U/L (ref 7–45)
ANION GAP SERPL CALC-SCNC: 10 MMOL/L (ref 10–20)
AST SERPL W P-5'-P-CCNC: 35 U/L (ref 9–39)
BASOPHILS # BLD AUTO: 0.01 X10*3/UL (ref 0–0.1)
BASOPHILS NFR BLD AUTO: 0.3 %
BILIRUB SERPL-MCNC: 0.5 MG/DL (ref 0–1.2)
BUN SERPL-MCNC: 11 MG/DL (ref 6–23)
CALCIUM SERPL-MCNC: 9.8 MG/DL (ref 8.6–10.3)
CARDIAC TROPONIN I PNL SERPL HS: 10 NG/L (ref 0–13)
CARDIAC TROPONIN I PNL SERPL HS: 8 NG/L (ref 0–13)
CARDIAC TROPONIN I PNL SERPL HS: 9 NG/L (ref 0–13)
CHLORIDE SERPL-SCNC: 103 MMOL/L (ref 98–107)
CO2 SERPL-SCNC: 29 MMOL/L (ref 21–32)
CREAT SERPL-MCNC: 0.64 MG/DL (ref 0.5–1.05)
D DIMER PPP FEU-MCNC: 926 NG/ML FEU
EGFRCR SERPLBLD CKD-EPI 2021: >90 ML/MIN/1.73M*2
EOSINOPHIL # BLD AUTO: 0.02 X10*3/UL (ref 0–0.4)
EOSINOPHIL NFR BLD AUTO: 0.7 %
ERYTHROCYTE [DISTWIDTH] IN BLOOD BY AUTOMATED COUNT: 11.8 % (ref 11.5–14.5)
FLUAV RNA RESP QL NAA+PROBE: NOT DETECTED
FLUBV RNA RESP QL NAA+PROBE: NOT DETECTED
GLUCOSE SERPL-MCNC: 126 MG/DL (ref 74–99)
HCT VFR BLD AUTO: 37.4 % (ref 36–46)
HGB BLD-MCNC: 12.6 G/DL (ref 12–16)
IMM GRANULOCYTES # BLD AUTO: 0.01 X10*3/UL (ref 0–0.5)
IMM GRANULOCYTES NFR BLD AUTO: 0.3 % (ref 0–0.9)
LIPASE SERPL-CCNC: 84 U/L (ref 9–82)
LYMPHOCYTES # BLD AUTO: 1.68 X10*3/UL (ref 0.8–3)
LYMPHOCYTES NFR BLD AUTO: 55.3 %
MAGNESIUM SERPL-MCNC: 1.64 MG/DL (ref 1.6–2.4)
MCH RBC QN AUTO: 31.1 PG (ref 26–34)
MCHC RBC AUTO-ENTMCNC: 33.7 G/DL (ref 32–36)
MCV RBC AUTO: 92 FL (ref 80–100)
MONOCYTES # BLD AUTO: 0.38 X10*3/UL (ref 0.05–0.8)
MONOCYTES NFR BLD AUTO: 12.5 %
NEUTROPHILS # BLD AUTO: 0.94 X10*3/UL (ref 1.6–5.5)
NEUTROPHILS NFR BLD AUTO: 30.9 %
NRBC BLD-RTO: 0 /100 WBCS (ref 0–0)
PLATELET # BLD AUTO: 164 X10*3/UL (ref 150–450)
POTASSIUM SERPL-SCNC: 3.3 MMOL/L (ref 3.5–5.3)
PROT SERPL-MCNC: 7.9 G/DL (ref 6.4–8.2)
RBC # BLD AUTO: 4.05 X10*6/UL (ref 4–5.2)
SARS-COV-2 RNA RESP QL NAA+PROBE: NOT DETECTED
SODIUM SERPL-SCNC: 139 MMOL/L (ref 136–145)
WBC # BLD AUTO: 3 X10*3/UL (ref 4.4–11.3)

## 2024-04-01 PROCEDURE — 83735 ASSAY OF MAGNESIUM: CPT

## 2024-04-01 PROCEDURE — 99285 EMERGENCY DEPT VISIT HI MDM: CPT | Mod: 25

## 2024-04-01 PROCEDURE — 2500000004 HC RX 250 GENERAL PHARMACY W/ HCPCS (ALT 636 FOR OP/ED)

## 2024-04-01 PROCEDURE — 96374 THER/PROPH/DIAG INJ IV PUSH: CPT

## 2024-04-01 PROCEDURE — 85025 COMPLETE CBC W/AUTO DIFF WBC: CPT

## 2024-04-01 PROCEDURE — 74177 CT ABD & PELVIS W/CONTRAST: CPT | Performed by: RADIOLOGY

## 2024-04-01 PROCEDURE — 84484 ASSAY OF TROPONIN QUANT: CPT

## 2024-04-01 PROCEDURE — 87636 SARSCOV2 & INF A&B AMP PRB: CPT | Performed by: STUDENT IN AN ORGANIZED HEALTH CARE EDUCATION/TRAINING PROGRAM

## 2024-04-01 PROCEDURE — 36415 COLL VENOUS BLD VENIPUNCTURE: CPT

## 2024-04-01 PROCEDURE — 71045 X-RAY EXAM CHEST 1 VIEW: CPT

## 2024-04-01 PROCEDURE — 96375 TX/PRO/DX INJ NEW DRUG ADDON: CPT | Mod: 59

## 2024-04-01 PROCEDURE — 93005 ELECTROCARDIOGRAM TRACING: CPT

## 2024-04-01 PROCEDURE — 2500000001 HC RX 250 WO HCPCS SELF ADMINISTERED DRUGS (ALT 637 FOR MEDICARE OP): Performed by: STUDENT IN AN ORGANIZED HEALTH CARE EDUCATION/TRAINING PROGRAM

## 2024-04-01 PROCEDURE — 2550000001 HC RX 255 CONTRASTS: Performed by: STUDENT IN AN ORGANIZED HEALTH CARE EDUCATION/TRAINING PROGRAM

## 2024-04-01 PROCEDURE — 71275 CT ANGIOGRAPHY CHEST: CPT | Performed by: RADIOLOGY

## 2024-04-01 PROCEDURE — 71275 CT ANGIOGRAPHY CHEST: CPT

## 2024-04-01 PROCEDURE — G0378 HOSPITAL OBSERVATION PER HR: HCPCS

## 2024-04-01 PROCEDURE — 83036 HEMOGLOBIN GLYCOSYLATED A1C: CPT | Mod: STJLAB

## 2024-04-01 PROCEDURE — 1200000002 HC GENERAL ROOM WITH TELEMETRY DAILY

## 2024-04-01 PROCEDURE — 83690 ASSAY OF LIPASE: CPT

## 2024-04-01 PROCEDURE — 2500000001 HC RX 250 WO HCPCS SELF ADMINISTERED DRUGS (ALT 637 FOR MEDICARE OP)

## 2024-04-01 PROCEDURE — 85379 FIBRIN DEGRADATION QUANT: CPT

## 2024-04-01 PROCEDURE — 74177 CT ABD & PELVIS W/CONTRAST: CPT

## 2024-04-01 PROCEDURE — 71045 X-RAY EXAM CHEST 1 VIEW: CPT | Performed by: RADIOLOGY

## 2024-04-01 PROCEDURE — 80053 COMPREHEN METABOLIC PANEL: CPT

## 2024-04-01 RX ORDER — AMLODIPINE BESYLATE 2.5 MG/1
2.5 TABLET ORAL DAILY
COMMUNITY

## 2024-04-01 RX ORDER — POTASSIUM CHLORIDE 20 MEQ/1
20 TABLET, EXTENDED RELEASE ORAL DAILY
COMMUNITY

## 2024-04-01 RX ORDER — LABETALOL HYDROCHLORIDE 5 MG/ML
10 INJECTION, SOLUTION INTRAVENOUS ONCE
Status: COMPLETED | OUTPATIENT
Start: 2024-04-01 | End: 2024-04-01

## 2024-04-01 RX ORDER — CARVEDILOL 6.25 MG/1
6.25 TABLET ORAL DAILY
Status: DISCONTINUED | OUTPATIENT
Start: 2024-04-02 | End: 2024-04-02

## 2024-04-01 RX ORDER — LEFLUNOMIDE 10 MG/1
20 TABLET ORAL DAILY
COMMUNITY

## 2024-04-01 RX ORDER — PANTOPRAZOLE SODIUM 40 MG/1
40 TABLET, DELAYED RELEASE ORAL NIGHTLY
Status: DISCONTINUED | OUTPATIENT
Start: 2024-04-01 | End: 2024-04-02 | Stop reason: HOSPADM

## 2024-04-01 RX ORDER — POTASSIUM CHLORIDE 20 MEQ/1
20 TABLET, EXTENDED RELEASE ORAL DAILY
Status: DISCONTINUED | OUTPATIENT
Start: 2024-04-02 | End: 2024-04-02 | Stop reason: HOSPADM

## 2024-04-01 RX ORDER — CALCIUM CARBONATE 300MG(750)
400 TABLET,CHEWABLE ORAL DAILY
COMMUNITY

## 2024-04-01 RX ORDER — NITROGLYCERIN 0.4 MG/1
0.4 TABLET SUBLINGUAL ONCE
Status: COMPLETED | OUTPATIENT
Start: 2024-04-01 | End: 2024-04-01

## 2024-04-01 RX ORDER — LATANOPROST 50 UG/ML
1 SOLUTION/ DROPS OPHTHALMIC NIGHTLY
COMMUNITY

## 2024-04-01 RX ORDER — ENOXAPARIN SODIUM 100 MG/ML
40 INJECTION SUBCUTANEOUS EVERY 24 HOURS
Status: DISCONTINUED | OUTPATIENT
Start: 2024-04-01 | End: 2024-04-02 | Stop reason: HOSPADM

## 2024-04-01 RX ORDER — HYDROXYCHLOROQUINE SULFATE 200 MG/1
400 TABLET, FILM COATED ORAL DAILY
COMMUNITY
End: 2024-04-01 | Stop reason: ENTERED-IN-ERROR

## 2024-04-01 RX ORDER — LATANOPROST 50 UG/ML
1 SOLUTION/ DROPS OPHTHALMIC NIGHTLY
Status: DISCONTINUED | OUTPATIENT
Start: 2024-04-01 | End: 2024-04-02 | Stop reason: HOSPADM

## 2024-04-01 RX ORDER — POLYETHYLENE GLYCOL 3350 17 G/17G
17 POWDER, FOR SOLUTION ORAL DAILY
Status: DISCONTINUED | OUTPATIENT
Start: 2024-04-01 | End: 2024-04-02 | Stop reason: HOSPADM

## 2024-04-01 RX ORDER — CARVEDILOL 6.25 MG/1
6.25 TABLET ORAL DAILY
Status: ON HOLD | COMMUNITY
End: 2024-04-02 | Stop reason: SDUPTHER

## 2024-04-01 RX ORDER — ATORVASTATIN CALCIUM 80 MG/1
80 TABLET, FILM COATED ORAL NIGHTLY
Status: DISCONTINUED | OUTPATIENT
Start: 2024-04-01 | End: 2024-04-02 | Stop reason: HOSPADM

## 2024-04-01 RX ORDER — LEFLUNOMIDE 10 MG/1
20 TABLET ORAL DAILY
Status: DISCONTINUED | OUTPATIENT
Start: 2024-04-02 | End: 2024-04-02 | Stop reason: HOSPADM

## 2024-04-01 RX ORDER — GABAPENTIN 300 MG/1
CAPSULE ORAL
COMMUNITY
Start: 2020-10-15 | End: 2024-04-01 | Stop reason: ENTERED-IN-ERROR

## 2024-04-01 RX ORDER — LANOLIN ALCOHOL/MO/W.PET/CERES
400 CREAM (GRAM) TOPICAL NIGHTLY
Status: DISCONTINUED | OUTPATIENT
Start: 2024-04-01 | End: 2024-04-02 | Stop reason: HOSPADM

## 2024-04-01 RX ORDER — NAPROXEN SODIUM 220 MG/1
324 TABLET, FILM COATED ORAL ONCE
Status: COMPLETED | OUTPATIENT
Start: 2024-04-01 | End: 2024-04-01

## 2024-04-01 RX ORDER — POTASSIUM CHLORIDE 1.5 G/1.58G
40 POWDER, FOR SOLUTION ORAL ONCE
Status: COMPLETED | OUTPATIENT
Start: 2024-04-01 | End: 2024-04-01

## 2024-04-01 RX ORDER — AMLODIPINE BESYLATE 2.5 MG/1
2.5 TABLET ORAL DAILY
Status: DISCONTINUED | OUTPATIENT
Start: 2024-04-02 | End: 2024-04-02 | Stop reason: HOSPADM

## 2024-04-01 RX ORDER — ATORVASTATIN CALCIUM 40 MG/1
40 TABLET, FILM COATED ORAL DAILY
COMMUNITY

## 2024-04-01 RX ADMIN — PANTOPRAZOLE SODIUM 40 MG: 40 TABLET, DELAYED RELEASE ORAL at 22:54

## 2024-04-01 RX ADMIN — Medication 400 MG: at 22:37

## 2024-04-01 RX ADMIN — ENOXAPARIN SODIUM 40 MG: 40 INJECTION SUBCUTANEOUS at 22:37

## 2024-04-01 RX ADMIN — ASPIRIN 324 MG: 81 TABLET, CHEWABLE ORAL at 14:26

## 2024-04-01 RX ADMIN — POTASSIUM CHLORIDE 40 MEQ: 1.5 POWDER, FOR SOLUTION ORAL at 18:18

## 2024-04-01 RX ADMIN — LABETALOL HYDROCHLORIDE 10 MG: 5 INJECTION, SOLUTION INTRAVENOUS at 15:37

## 2024-04-01 RX ADMIN — IOHEXOL 75 ML: 350 INJECTION, SOLUTION INTRAVENOUS at 18:07

## 2024-04-01 RX ADMIN — NITROGLYCERIN 0.4 MG: 0.4 TABLET SUBLINGUAL at 16:56

## 2024-04-01 RX ADMIN — ATORVASTATIN CALCIUM 80 MG: 80 TABLET, FILM COATED ORAL at 22:37

## 2024-04-01 SDOH — SOCIAL STABILITY: SOCIAL INSECURITY: WERE YOU ABLE TO COMPLETE ALL THE BEHAVIORAL HEALTH SCREENINGS?: YES

## 2024-04-01 SDOH — SOCIAL STABILITY: SOCIAL INSECURITY: ABUSE: ADULT

## 2024-04-01 SDOH — SOCIAL STABILITY: SOCIAL INSECURITY: DO YOU FEEL ANYONE HAS EXPLOITED OR TAKEN ADVANTAGE OF YOU FINANCIALLY OR OF YOUR PERSONAL PROPERTY?: NO

## 2024-04-01 SDOH — SOCIAL STABILITY: SOCIAL INSECURITY: ARE YOU OR HAVE YOU BEEN THREATENED OR ABUSED PHYSICALLY, EMOTIONALLY, OR SEXUALLY BY ANYONE?: NO

## 2024-04-01 SDOH — SOCIAL STABILITY: SOCIAL INSECURITY: HAVE YOU HAD THOUGHTS OF HARMING ANYONE ELSE?: NO

## 2024-04-01 SDOH — SOCIAL STABILITY: SOCIAL INSECURITY: DO YOU FEEL UNSAFE GOING BACK TO THE PLACE WHERE YOU ARE LIVING?: NO

## 2024-04-01 SDOH — SOCIAL STABILITY: SOCIAL INSECURITY: HAS ANYONE EVER THREATENED TO HURT YOUR FAMILY OR YOUR PETS?: NO

## 2024-04-01 SDOH — SOCIAL STABILITY: SOCIAL INSECURITY: ARE THERE ANY APPARENT SIGNS OF INJURIES/BEHAVIORS THAT COULD BE RELATED TO ABUSE/NEGLECT?: NO

## 2024-04-01 SDOH — SOCIAL STABILITY: SOCIAL INSECURITY: DOES ANYONE TRY TO KEEP YOU FROM HAVING/CONTACTING OTHER FRIENDS OR DOING THINGS OUTSIDE YOUR HOME?: NO

## 2024-04-01 ASSESSMENT — ENCOUNTER SYMPTOMS
PALPITATIONS: 0
FEVER: 0
FREQUENCY: 0
DIARRHEA: 0
DIFFICULTY URINATING: 0
ACTIVITY CHANGE: 0
SORE THROAT: 0
ABDOMINAL PAIN: 0
COUGH: 0
VOMITING: 0
CHILLS: 0
NERVOUS/ANXIOUS: 0
LIGHT-HEADEDNESS: 1
RHINORRHEA: 0
NAUSEA: 1
SHORTNESS OF BREATH: 0
DYSURIA: 0
APPETITE CHANGE: 0

## 2024-04-01 ASSESSMENT — COLUMBIA-SUICIDE SEVERITY RATING SCALE - C-SSRS
1. IN THE PAST MONTH, HAVE YOU WISHED YOU WERE DEAD OR WISHED YOU COULD GO TO SLEEP AND NOT WAKE UP?: NO
6. HAVE YOU EVER DONE ANYTHING, STARTED TO DO ANYTHING, OR PREPARED TO DO ANYTHING TO END YOUR LIFE?: NO
6. HAVE YOU EVER DONE ANYTHING, STARTED TO DO ANYTHING, OR PREPARED TO DO ANYTHING TO END YOUR LIFE?: NO
2. HAVE YOU ACTUALLY HAD ANY THOUGHTS OF KILLING YOURSELF?: NO
2. HAVE YOU ACTUALLY HAD ANY THOUGHTS OF KILLING YOURSELF?: NO
1. IN THE PAST MONTH, HAVE YOU WISHED YOU WERE DEAD OR WISHED YOU COULD GO TO SLEEP AND NOT WAKE UP?: NO

## 2024-04-01 ASSESSMENT — LIFESTYLE VARIABLES
HOW OFTEN DO YOU HAVE 6 OR MORE DRINKS ON ONE OCCASION: NEVER
AUDIT-C TOTAL SCORE: 0
EVER HAD A DRINK FIRST THING IN THE MORNING TO STEADY YOUR NERVES TO GET RID OF A HANGOVER: NO
EVER FELT BAD OR GUILTY ABOUT YOUR DRINKING: NO
SKIP TO QUESTIONS 9-10: 1
AUDIT-C TOTAL SCORE: 0
HOW OFTEN DO YOU HAVE A DRINK CONTAINING ALCOHOL: NEVER
TOTAL SCORE: 0
HAVE YOU EVER FELT YOU SHOULD CUT DOWN ON YOUR DRINKING: NO
HOW MANY STANDARD DRINKS CONTAINING ALCOHOL DO YOU HAVE ON A TYPICAL DAY: PATIENT DOES NOT DRINK
HAVE PEOPLE ANNOYED YOU BY CRITICIZING YOUR DRINKING: NO

## 2024-04-01 ASSESSMENT — COGNITIVE AND FUNCTIONAL STATUS - GENERAL
PATIENT BASELINE BEDBOUND: NO
MOBILITY SCORE: 24
DAILY ACTIVITIY SCORE: 24

## 2024-04-01 ASSESSMENT — ACTIVITIES OF DAILY LIVING (ADL)
HEARING - RIGHT EAR: FUNCTIONAL
TOILETING: INDEPENDENT
WALKS IN HOME: INDEPENDENT
FEEDING YOURSELF: INDEPENDENT
GROOMING: INDEPENDENT
HEARING - LEFT EAR: FUNCTIONAL
BATHING: INDEPENDENT
DRESSING YOURSELF: INDEPENDENT
PATIENT'S MEMORY ADEQUATE TO SAFELY COMPLETE DAILY ACTIVITIES?: YES
ADEQUATE_TO_COMPLETE_ADL: YES
LACK_OF_TRANSPORTATION: NO
JUDGMENT_ADEQUATE_SAFELY_COMPLETE_DAILY_ACTIVITIES: YES

## 2024-04-01 ASSESSMENT — PAIN SCALES - GENERAL
PAINLEVEL_OUTOF10: 6
PAINLEVEL_OUTOF10: 0 - NO PAIN
PAINLEVEL_OUTOF10: 4
PAINLEVEL_OUTOF10: 6

## 2024-04-01 ASSESSMENT — PAIN DESCRIPTION - LOCATION: LOCATION: CHEST

## 2024-04-01 ASSESSMENT — PAIN DESCRIPTION - PAIN TYPE: TYPE: ACUTE PAIN

## 2024-04-01 ASSESSMENT — PAIN - FUNCTIONAL ASSESSMENT
PAIN_FUNCTIONAL_ASSESSMENT: 0-10
PAIN_FUNCTIONAL_ASSESSMENT: 0-10

## 2024-04-01 ASSESSMENT — PATIENT HEALTH QUESTIONNAIRE - PHQ9
SUM OF ALL RESPONSES TO PHQ9 QUESTIONS 1 & 2: 0
2. FEELING DOWN, DEPRESSED OR HOPELESS: NOT AT ALL
1. LITTLE INTEREST OR PLEASURE IN DOING THINGS: NOT AT ALL

## 2024-04-01 ASSESSMENT — PAIN DESCRIPTION - ORIENTATION: ORIENTATION: MID

## 2024-04-01 NOTE — ED PROVIDER NOTES
EMERGENCY DEPARTMENT ENCOUNTER      Pt Name: Collin Lazar  MRN: [id number]  Birthdate 1950  Date of evaluation: 4/1/2024  Provider: Cameron Young MD    CHIEF COMPLAINT       Chief Complaint   Patient presents with    Chest Pain         HISTORY OF PRESENT ILLNESS    73-year-old female with a history of HTN, SLE, RA, remote history of lung cancer s/p right lower lobe resection presenting to the ED with complaint of epigastric and chest pain.  She reports experiencing lightheadedness last night worse with movement that continued this morning with epigastric burning pain worse with food but then progressed to sharp chest pain.  She attempted taking Pepto-Bismol which worsened her symptoms than Tums which did not do anything.  Reports the pain is a 7/10 currently.  Although she has a naproxen allergy that causes itching she does take a baby aspirin occasionally.  Denies experiencing the symptoms before.  Denies nausea, palpitations, changes in vision, jaw pain, shortness of breath, or sick symptoms.      History provided by:  Patient      Nursing Notes were reviewed.    PAST MEDICAL HISTORY     Past Medical History:   Diagnosis Date    Arthritis     Hypertension     Personal history of other diseases of the circulatory system     History of hypertension    Personal history of other diseases of the musculoskeletal system and connective tissue     History of rheumatoid arthritis    Personal history of other malignant neoplasm of bronchus and lung     History of malignant neoplasm of lung    Systemic lupus erythematosus, unspecified (CMS/HCC)     Lupus         SURGICAL HISTORY       Past Surgical History:   Procedure Laterality Date    MR HEAD ANGIO WO IV CONTRAST  9/4/2020    MR HEAD ANGIO WO IV CONTRAST 9/4/2020 [facility]    OTHER SURGICAL HISTORY  01/16/2021    Lumpectomy    OTHER SURGICAL HISTORY  01/28/2022    Hysterectomy    US ASPIRATION INJECTION INTERMEDIATE JOINT  6/10/2021    US ASPIRATION  INJECTION INTERMEDIATE JOINT 6/10/2021 [facility]         CURRENT MEDICATIONS       Previous Medications    AMLODIPINE (NORVASC) 2.5 MG TABLET    Take 1 tablet (2.5 mg) by mouth once daily.    ATORVASTATIN (LIPITOR) 40 MG TABLET    Take 1 tablet (40 mg) by mouth once daily.    CARVEDILOL (COREG) 6.25 MG TABLET    Take 1 tablet (6.25 mg) by mouth once daily.    GABAPENTIN (NEURONTIN) 300 MG CAPSULE    TAKE 1 CAPSULE ONCE DAILY AT NIGHT    HYDROXYCHLOROQUINE (PLAQUENIL) 200 MG TABLET    Take 2 tablets (400 mg) by mouth once daily.    LIDOCAINE (LIDODERM) 5 % PATCH    Place 1 patch over 12 hours on the skin once daily. Remove & discard patch within 12 hours or as directed by MD.    LIDOCAINE (LIDODERM) 5 % PATCH    Place 1 patch over 12 hours on the skin once daily. Remove & discard patch within 12 hours or as directed by MD.    TIZANIDINE (ZANAFLEX) 2 MG TABLET    Take 1 tablet (2 mg) by mouth every 6 hours for 10 days. Do not take if planning to drive or operate heavy machinery       ALLERGIES     Naproxen    FAMILY HISTORY     No family history on file.       SOCIAL HISTORY       Social History     Socioeconomic History    Marital status:      Spouse name: None    Number of children: None    Years of education: None    Highest education level: None   Occupational History    None   Tobacco Use    Smoking status: Former     Types: Cigarettes    Smokeless tobacco: Never   Substance and Sexual Activity    Alcohol use: Never    Drug use: Never    Sexual activity: Never   Other Topics Concern    None   Social History Narrative    None     Social Determinants of Health     Financial Resource Strain: Not on file   Food Insecurity: Not on file   Transportation Needs: Not on file   Physical Activity: Not on file   Stress: Not on file   Social Connections: Not on file   Intimate Partner Violence: Not on file   Housing Stability: Not on file       SCREENINGS                        PHYSICAL EXAM    (up to 7 for  level 4, 8 or more for level 5)     ED Triage Vitals [04/01/24 1316]   Temperature Heart Rate Respirations BP   36.5 °C (97.7 °F) 81 18 (!) 184/84      Pulse Ox Temp Source Heart Rate Source Patient Position   95 % Temporal -- --      BP Location FiO2 (%)     -- --       Physical Exam  Vitals and nursing note reviewed.   Constitutional:       General: She is awake. She is not in acute distress.     Appearance: Normal appearance. She is well-developed.   HENT:      Head: Normocephalic and atraumatic.      Right Ear: External ear normal.      Left Ear: External ear normal.      Nose: Nose normal. No congestion or rhinorrhea.      Mouth/Throat:      Mouth: Mucous membranes are moist.      Pharynx: Oropharynx is clear. No posterior oropharyngeal erythema.   Eyes:      General: No scleral icterus.        Right eye: No discharge.         Left eye: No discharge.      Extraocular Movements: Extraocular movements intact.      Conjunctiva/sclera: Conjunctivae normal.   Cardiovascular:      Rate and Rhythm: Normal rate and regular rhythm.      Pulses: Normal pulses.      Heart sounds: Normal heart sounds. No murmur heard.     No friction rub.   Pulmonary:      Effort: Pulmonary effort is normal. No respiratory distress.      Breath sounds: Normal breath sounds. No stridor. No wheezing or rales.   Abdominal:      General: There is no distension.      Palpations: Abdomen is soft.      Tenderness: There is abdominal tenderness in the epigastric area. There is no right CVA tenderness, left CVA tenderness, guarding or rebound.   Musculoskeletal:         General: No swelling or tenderness.      Cervical back: Normal range of motion and neck supple.      Right lower leg: No edema.      Left lower leg: No edema.   Skin:     General: Skin is warm and dry.      Capillary Refill: Capillary refill takes less than 2 seconds.      Coloration: Skin is not jaundiced or pale.      Findings: No lesion or rash.   Neurological:      General: No  focal deficit present.      Mental Status: She is alert and oriented to person, place, and time. Mental status is at baseline.      Cranial Nerves: No cranial nerve deficit.      Sensory: No sensory deficit.      Motor: No weakness.   Psychiatric:         Mood and Affect: Mood normal.         Behavior: Behavior normal. Behavior is cooperative.         Thought Content: Thought content normal.         Judgment: Judgment normal.          DIAGNOSTIC RESULTS     LABS:  Labs Reviewed   TROPONIN SERIES- (INITIAL, 1 HR)    Narrative:     The following orders were created for panel order Troponin I Series, High Sensitivity (0, 1 HR).  Procedure                               Abnormality         Status                     ---------                               -----------         ------                     Troponin I, High Sensiti...[[id number]]                                                   Please view results for these tests on the individual orders.   CBC WITH AUTO DIFFERENTIAL   COMPREHENSIVE METABOLIC PANEL   MAGNESIUM   SERIAL TROPONIN-INITIAL   D-DIMER, VTE EXCLUSION       All other labs were within normal range or not returned as of this dictation.    Imaging  XR chest 1 view    (Results Pending)   CT abdomen pelvis w IV contrast    (Results Pending)        Procedures  Procedures     EMERGENCY DEPARTMENT COURSE/MDM:     ED Course as of 04/02/24 1524   Mon Apr 01, 2024   1733 POTASSIUM(!): 3.3  Potassium replaced. [ES]   1908 Symptomatic improvement of pain following nitro. Spoke with family regarding discharge due to low risk factors (Heart score 3) versus admission for observation. They report concern and agree with observation. [ES]      ED Course User Index  [ES] Cameron Young MD         Diagnoses as of 04/02/24 1524   Angina at rest (CMS/[facility])        Medical Decision Making  73-year-old female presenting with burning epigastric and sharp chest pain since this morning.  Heart score of 3 without troponins.  Patient  reports tolerating aspirin and did not improve with Pepto-Bismol or Tums at home.  4 baby aspirin provided.  EKG reveals sinus rhythm at 74 bpm with no acute ischemic changes.      Basic labs, troponins, CXR, D-dimer, and CT abdomen pelvis with IV contrast ordered.  Differential including but not limited to ACS, PE, GI ulcer, cholecystitis, and cholelithiasis.    Sublingual nitroglycerin provided and patient reports improvement in pain making ACS more likely.  Troponins within normal limits x 2.  CT of the chest abdomen pelvis negative for PE, effusion, infiltrate, or consolidation to suggest pneumonia, or gastrointestinal stranding to suggest inflammation.  Patient updated on findings and plan to discharge home with sublingual nitro and follow-up with cardiology outpatient for stress testing versus admission to the hospital to be monitored and evaluated by the cardiologist.  Patient reports desire to stay in the hospital due to duration of chest pain she experienced.  Patient admitted for chest pain relieved with nitroglycerin suggestive of angina although not exertional.  Case discussed and planned in its entirety with attending, Dr. Valdez.        Patient and or family in agreement and understanding of treatment plan.  All questions answered.      I reviewed the case with the attending ED physician. The attending ED physician agrees with the plan. Patient and/or patient´s representative was counseled regarding labs, imaging, likely diagnosis, and plan. All questions were answered.    ED Medications administered this visit:    Medications   aspirin chewable tablet 324 mg (has no administration in time range)       New Prescriptions from this visit:    New Prescriptions    No medications on file       Follow-up:  No follow-up provider specified.      Final Impression: No diagnosis found.      (Please note that portions of this note were completed with a voice recognition program.  Efforts were made to edit the  dictations but occasionally words are mis-transcribed.)      Cameron Young MD  Resident  04/02/24 2390

## 2024-04-02 ENCOUNTER — APPOINTMENT (OUTPATIENT)
Dept: RADIOLOGY | Facility: HOSPITAL | Age: 74
DRG: 311 | End: 2024-04-02
Payer: MEDICARE

## 2024-04-02 ENCOUNTER — APPOINTMENT (OUTPATIENT)
Dept: CARDIOLOGY | Facility: HOSPITAL | Age: 74
DRG: 311 | End: 2024-04-02
Payer: MEDICARE

## 2024-04-02 VITALS
OXYGEN SATURATION: 96 % | BODY MASS INDEX: 28.03 KG/M2 | DIASTOLIC BLOOD PRESSURE: 85 MMHG | TEMPERATURE: 97.3 F | WEIGHT: 178.57 LBS | HEART RATE: 90 BPM | RESPIRATION RATE: 16 BRPM | HEIGHT: 67 IN | SYSTOLIC BLOOD PRESSURE: 168 MMHG

## 2024-04-02 PROBLEM — I20.89 ANGINA AT REST (CMS-HCC): Status: RESOLVED | Noted: 2024-04-01 | Resolved: 2024-04-02

## 2024-04-02 PROBLEM — I10 PRIMARY HYPERTENSION: Status: ACTIVE | Noted: 2024-04-02

## 2024-04-02 LAB
ALBUMIN SERPL BCP-MCNC: 3.7 G/DL (ref 3.4–5)
ANION GAP SERPL CALC-SCNC: 11 MMOL/L (ref 10–20)
BUN SERPL-MCNC: 12 MG/DL (ref 6–23)
CALCIUM SERPL-MCNC: 9.2 MG/DL (ref 8.6–10.3)
CARDIAC TROPONIN I PNL SERPL HS: 10 NG/L (ref 0–13)
CHLORIDE SERPL-SCNC: 106 MMOL/L (ref 98–107)
CHOLEST SERPL-MCNC: 118 MG/DL (ref 0–199)
CHOLESTEROL/HDL RATIO: 3.7
CO2 SERPL-SCNC: 27 MMOL/L (ref 21–32)
CREAT SERPL-MCNC: 0.71 MG/DL (ref 0.5–1.05)
EGFRCR SERPLBLD CKD-EPI 2021: >90 ML/MIN/1.73M*2
ERYTHROCYTE [DISTWIDTH] IN BLOOD BY AUTOMATED COUNT: 11.9 % (ref 11.5–14.5)
EST. AVERAGE GLUCOSE BLD GHB EST-MCNC: 77 MG/DL
GLUCOSE SERPL-MCNC: 96 MG/DL (ref 74–99)
HBA1C MFR BLD: 4.3 %
HCT VFR BLD AUTO: 37.3 % (ref 36–46)
HDLC SERPL-MCNC: 32.3 MG/DL
HGB BLD-MCNC: 12.5 G/DL (ref 12–16)
LDLC SERPL CALC-MCNC: 60 MG/DL
MAGNESIUM SERPL-MCNC: 1.59 MG/DL (ref 1.6–2.4)
MCH RBC QN AUTO: 31.3 PG (ref 26–34)
MCHC RBC AUTO-ENTMCNC: 33.5 G/DL (ref 32–36)
MCV RBC AUTO: 94 FL (ref 80–100)
NON HDL CHOLESTEROL: 86 MG/DL (ref 0–149)
NRBC BLD-RTO: 0 /100 WBCS (ref 0–0)
PHOSPHATE SERPL-MCNC: 3.4 MG/DL (ref 2.5–4.9)
PLATELET # BLD AUTO: 173 X10*3/UL (ref 150–450)
POTASSIUM SERPL-SCNC: 3.6 MMOL/L (ref 3.5–5.3)
RBC # BLD AUTO: 3.99 X10*6/UL (ref 4–5.2)
SODIUM SERPL-SCNC: 140 MMOL/L (ref 136–145)
TRIGL SERPL-MCNC: 127 MG/DL (ref 0–149)
VLDL: 25 MG/DL (ref 0–40)
WBC # BLD AUTO: 3.8 X10*3/UL (ref 4.4–11.3)

## 2024-04-02 PROCEDURE — 2500000004 HC RX 250 GENERAL PHARMACY W/ HCPCS (ALT 636 FOR OP/ED)

## 2024-04-02 PROCEDURE — 96366 THER/PROPH/DIAG IV INF ADDON: CPT

## 2024-04-02 PROCEDURE — 2500000001 HC RX 250 WO HCPCS SELF ADMINISTERED DRUGS (ALT 637 FOR MEDICARE OP)

## 2024-04-02 PROCEDURE — G0378 HOSPITAL OBSERVATION PER HR: HCPCS

## 2024-04-02 PROCEDURE — 96361 HYDRATE IV INFUSION ADD-ON: CPT

## 2024-04-02 PROCEDURE — 83735 ASSAY OF MAGNESIUM: CPT

## 2024-04-02 PROCEDURE — 80069 RENAL FUNCTION PANEL: CPT

## 2024-04-02 PROCEDURE — 80061 LIPID PANEL: CPT

## 2024-04-02 PROCEDURE — 78452 HT MUSCLE IMAGE SPECT MULT: CPT | Performed by: NUCLEAR MEDICINE

## 2024-04-02 PROCEDURE — 85027 COMPLETE CBC AUTOMATED: CPT

## 2024-04-02 PROCEDURE — 3430000001 HC RX 343 DIAGNOSTIC RADIOPHARMACEUTICALS: Performed by: STUDENT IN AN ORGANIZED HEALTH CARE EDUCATION/TRAINING PROGRAM

## 2024-04-02 PROCEDURE — 2500000001 HC RX 250 WO HCPCS SELF ADMINISTERED DRUGS (ALT 637 FOR MEDICARE OP): Performed by: STUDENT IN AN ORGANIZED HEALTH CARE EDUCATION/TRAINING PROGRAM

## 2024-04-02 PROCEDURE — 99236 HOSP IP/OBS SAME DATE HI 85: CPT

## 2024-04-02 PROCEDURE — 93017 CV STRESS TEST TRACING ONLY: CPT

## 2024-04-02 PROCEDURE — 2500000002 HC RX 250 W HCPCS SELF ADMINISTERED DRUGS (ALT 637 FOR MEDICARE OP, ALT 636 FOR OP/ED)

## 2024-04-02 PROCEDURE — 96365 THER/PROPH/DIAG IV INF INIT: CPT | Mod: 59

## 2024-04-02 PROCEDURE — 36415 COLL VENOUS BLD VENIPUNCTURE: CPT

## 2024-04-02 PROCEDURE — A9502 TC99M TETROFOSMIN: HCPCS | Performed by: STUDENT IN AN ORGANIZED HEALTH CARE EDUCATION/TRAINING PROGRAM

## 2024-04-02 PROCEDURE — 78452 HT MUSCLE IMAGE SPECT MULT: CPT

## 2024-04-02 PROCEDURE — 84484 ASSAY OF TROPONIN QUANT: CPT

## 2024-04-02 RX ORDER — POTASSIUM CHLORIDE 14.9 MG/ML
20 INJECTION INTRAVENOUS
Status: DISCONTINUED | OUTPATIENT
Start: 2024-04-02 | End: 2024-04-02

## 2024-04-02 RX ORDER — CARVEDILOL 6.25 MG/1
6.25 TABLET ORAL 2 TIMES DAILY
Status: DISCONTINUED | OUTPATIENT
Start: 2024-04-02 | End: 2024-04-02 | Stop reason: HOSPADM

## 2024-04-02 RX ORDER — CARVEDILOL 6.25 MG/1
6.25 TABLET ORAL
Qty: 60 TABLET | Refills: 0 | Status: SHIPPED | OUTPATIENT
Start: 2024-04-02

## 2024-04-02 RX ORDER — CARVEDILOL 6.25 MG/1
6.25 TABLET ORAL ONCE
Status: COMPLETED | OUTPATIENT
Start: 2024-04-02 | End: 2024-04-02

## 2024-04-02 RX ORDER — MAGNESIUM SULFATE HEPTAHYDRATE 40 MG/ML
4 INJECTION, SOLUTION INTRAVENOUS ONCE
Status: COMPLETED | OUTPATIENT
Start: 2024-04-02 | End: 2024-04-02

## 2024-04-02 RX ORDER — REGADENOSON 0.08 MG/ML
0.4 INJECTION, SOLUTION INTRAVENOUS
Status: CANCELLED | OUTPATIENT
Start: 2024-04-02

## 2024-04-02 RX ORDER — AMINOPHYLLINE 25 MG/ML
125 INJECTION, SOLUTION INTRAVENOUS AS NEEDED
Status: CANCELLED | OUTPATIENT
Start: 2024-04-02

## 2024-04-02 RX ORDER — POTASSIUM CHLORIDE 1.5 G/1.58G
40 POWDER, FOR SOLUTION ORAL ONCE
Status: COMPLETED | OUTPATIENT
Start: 2024-04-02 | End: 2024-04-02

## 2024-04-02 RX ADMIN — POTASSIUM CHLORIDE 40 MEQ: 1.5 POWDER, FOR SOLUTION ORAL at 12:56

## 2024-04-02 RX ADMIN — SODIUM CHLORIDE, POTASSIUM CHLORIDE, SODIUM LACTATE AND CALCIUM CHLORIDE 1000 ML: 600; 310; 30; 20 INJECTION, SOLUTION INTRAVENOUS at 02:01

## 2024-04-02 RX ADMIN — TETROFOSMIN 11.3 MILLICURIE: 0.23 INJECTION, POWDER, LYOPHILIZED, FOR SOLUTION INTRAVENOUS at 08:20

## 2024-04-02 RX ADMIN — LEFLUNOMIDE 20 MG: 10 TABLET ORAL at 13:03

## 2024-04-02 RX ADMIN — MAGNESIUM SULFATE IN WATER 4 G: 40 INJECTION, SOLUTION INTRAVENOUS at 13:03

## 2024-04-02 RX ADMIN — TETROFOSMIN 33.6 MILLICURIE: 0.23 INJECTION, POWDER, LYOPHILIZED, FOR SOLUTION INTRAVENOUS at 10:40

## 2024-04-02 RX ADMIN — CARVEDILOL 6.25 MG: 6.25 TABLET, FILM COATED ORAL at 13:22

## 2024-04-02 RX ADMIN — POTASSIUM CHLORIDE 20 MEQ: 1500 TABLET, EXTENDED RELEASE ORAL at 12:57

## 2024-04-02 RX ADMIN — AMLODIPINE BESYLATE 2.5 MG: 2.5 TABLET ORAL at 12:57

## 2024-04-02 ASSESSMENT — PAIN SCALES - GENERAL
PAINLEVEL_OUTOF10: 0 - NO PAIN
PAINLEVEL_OUTOF10: 0 - NO PAIN

## 2024-04-02 ASSESSMENT — COGNITIVE AND FUNCTIONAL STATUS - GENERAL
DAILY ACTIVITIY SCORE: 24
MOBILITY SCORE: 24

## 2024-04-02 ASSESSMENT — PAIN - FUNCTIONAL ASSESSMENT
PAIN_FUNCTIONAL_ASSESSMENT: 0-10
PAIN_FUNCTIONAL_ASSESSMENT: 0-10

## 2024-04-02 NOTE — H&P
History Of Present Illness  Collin Lazar is a 73 y.o. female with a history of hypertension, GERD, rheumatoid arthritis, remote history of lung cancer status post right lobectomy presenting to the emergency department under the direction of her primary care doctor for chest pain.  Patient is the main historian and is overall reliable.  She reports a few day history of epigastric/substernal chest pain that she initially thought was reflux related symptoms, tried over-the-counter medications including as needed over-the-counter acid medication and Tums without any relief.  She became concerned this morning when she developed lightheadedness on the onset of awakening, worse when it was on her side and did not get better until she would roll over to her other side.  Epigastric pain became more persistent and associated with nausea as well.  She was unable to exert herself during the day secondary to epigastric pain.  She does not have nitro at home and at rest did not make it better.  When discussing her stress test from 2020, patient reports she had different symptoms compared to this episode.  Her symptoms back in 2021 more so central chest pain with heaviness.  That cardiac workup was negative in 2020.    Of note: Patient does report that she has been told that she has a hiatal hernia, but has not had an upper scope.  Her last colonoscopy was greater than 10 years ago.    Denies any other symptoms including fever, chills, URI symptoms, vision changes, hearing loss, abdominal pain, diarrhea, urinary symptoms, rash, passing out spells.    Past medical history: Per above  Family history: No cardiac history in her family to her knowledge.  States her father passed away from trauma and mom passed away from sepsis due to a foot infection.  She has 15 siblings and none have heart issues to her knowledge.  Surgical history: Lobectomy with lymph node removal, hysterectomy, bunion surgery.  Social history: She was 1/2  pack/day smoker for total of 5 years and had quit over 50 years ago.  Very occasional alcohol use, socially, denies any recreational drug use.    In the ED her vital signs were temp 97.9, pulse 81, respirations 18, blood pressure 184/84, SpO2 95 on room air.  She remained fairly hypertensive in the ED with max systolic up to 203.  Laboratory wise had hypokalemia at 3.3, CMP otherwise unremarkable, tropes 8, 9.  CBC with differential remarkable for a WBC of 3.0, however otherwise unremarkable.  COVID, flu were negative.  She did have an elevated D-dimer at 926 had a CT abdomen pelvis.  There is no evidence of PE, nonspecific right ovarian cyst that we will need follow-up, diverticulosis without diverticulitis atherosclerosis.  EKGs showing normal sinus rhythm without any ST, T wave changes.  She was given asa and NG which relieved her chest pain and lightheadedness. S/p dose of labetalol for HTN.     Past Medical History  She has a past medical history of Arthritis, Hypertension, Personal history of other diseases of the circulatory system, Personal history of other diseases of the musculoskeletal system and connective tissue, Personal history of other malignant neoplasm of bronchus and lung, and Systemic lupus erythematosus, unspecified (CMS/HCC).    Surgical History  She has a past surgical history that includes Other surgical history (01/16/2021); Other surgical history (01/28/2022); MR angio head wo IV contrast (9/4/2020); and US aspiration injection intermediate joint (6/10/2021).     Social History  She reports that she has quit smoking. Her smoking use included cigarettes. She has never used smokeless tobacco. She reports that she does not drink alcohol and does not use drugs.    Family History  No family history on file.     Allergies  Naproxen    Review of Systems   Constitutional:  Negative for activity change, appetite change, chills and fever.   HENT:  Negative for congestion, rhinorrhea and sore throat.     Respiratory:  Negative for cough and shortness of breath.    Cardiovascular:  Positive for chest pain (epigastric). Negative for palpitations.   Gastrointestinal:  Positive for nausea. Negative for abdominal pain, diarrhea and vomiting.   Genitourinary:  Negative for difficulty urinating, dysuria, frequency and urgency.   Skin:  Negative for rash.   Neurological:  Positive for light-headedness. Negative for syncope.   Psychiatric/Behavioral:  The patient is not nervous/anxious.      Physical Exam  Vitals reviewed.   Constitutional:       General: She is not in acute distress.     Appearance: Normal appearance. She is not ill-appearing, toxic-appearing or diaphoretic.   HENT:      Head: Normocephalic.      Right Ear: Tympanic membrane, ear canal and external ear normal.      Left Ear: Tympanic membrane and ear canal normal.      Nose: Nose normal.      Mouth/Throat:      Mouth: Mucous membranes are moist.      Pharynx: Oropharynx is clear. No posterior oropharyngeal erythema.   Eyes:      Extraocular Movements: Extraocular movements intact.      Pupils: Pupils are equal, round, and reactive to light.   Cardiovascular:      Rate and Rhythm: Normal rate and regular rhythm.      Pulses: Normal pulses.      Heart sounds: No murmur heard.  Pulmonary:      Effort: Pulmonary effort is normal.      Breath sounds: Normal breath sounds. No wheezing, rhonchi or rales.   Abdominal:      General: Abdomen is flat. Bowel sounds are normal.      Palpations: Abdomen is soft.      Tenderness: There is abdominal tenderness (epigastric). There is no guarding or rebound.   Musculoskeletal:      Cervical back: Normal range of motion and neck supple.      Right lower leg: No edema.      Comments: No reproducible pain with palpation of sternum, ribs.   Skin:     General: Skin is warm and dry.      Capillary Refill: Capillary refill takes less than 2 seconds.   Neurological:      Mental Status: She is alert and oriented to person, place,  and time.   Psychiatric:         Mood and Affect: Mood normal.       Last Recorded Vitals  /73   Pulse 64   Temp 36.5 °C (97.7 °F) (Temporal)   Resp 18   Wt 81.6 kg (180 lb)   SpO2 97%     Relevant Results      Results for orders placed or performed during the hospital encounter of 04/01/24 (from the past 24 hour(s))   ECG 12 lead   Result Value Ref Range    Ventricular Rate 74 BPM    Atrial Rate 74 BPM    NC Interval 158 ms    QRS Duration 94 ms    QT Interval 360 ms    QTC Calculation(Bazett) 399 ms    P Axis 59 degrees    R Axis -38 degrees    T Axis 72 degrees    QRS Count 12 beats    Q Onset 210 ms    P Onset 131 ms    P Offset 183 ms    T Offset 390 ms    QTC Fredericia 386 ms   CBC and Auto Differential   Result Value Ref Range    WBC 3.0 (L) 4.4 - 11.3 x10*3/uL    nRBC 0.0 0.0 - 0.0 /100 WBCs    RBC 4.05 4.00 - 5.20 x10*6/uL    Hemoglobin 12.6 12.0 - 16.0 g/dL    Hematocrit 37.4 36.0 - 46.0 %    MCV 92 80 - 100 fL    MCH 31.1 26.0 - 34.0 pg    MCHC 33.7 32.0 - 36.0 g/dL    RDW 11.8 11.5 - 14.5 %    Platelets 164 150 - 450 x10*3/uL    Neutrophils % 30.9 40.0 - 80.0 %    Immature Granulocytes %, Automated 0.3 0.0 - 0.9 %    Lymphocytes % 55.3 13.0 - 44.0 %    Monocytes % 12.5 2.0 - 10.0 %    Eosinophils % 0.7 0.0 - 6.0 %    Basophils % 0.3 0.0 - 2.0 %    Neutrophils Absolute 0.94 (L) 1.60 - 5.50 x10*3/uL    Immature Granulocytes Absolute, Automated 0.01 0.00 - 0.50 x10*3/uL    Lymphocytes Absolute 1.68 0.80 - 3.00 x10*3/uL    Monocytes Absolute 0.38 0.05 - 0.80 x10*3/uL    Eosinophils Absolute 0.02 0.00 - 0.40 x10*3/uL    Basophils Absolute 0.01 0.00 - 0.10 x10*3/uL   Comprehensive Metabolic Panel   Result Value Ref Range    Glucose 126 (H) 74 - 99 mg/dL    Sodium 139 136 - 145 mmol/L    Potassium 3.3 (L) 3.5 - 5.3 mmol/L    Chloride 103 98 - 107 mmol/L    Bicarbonate 29 21 - 32 mmol/L    Anion Gap 10 10 - 20 mmol/L    Urea Nitrogen 11 6 - 23 mg/dL    Creatinine 0.64 0.50 - 1.05 mg/dL    eGFR >90  >60 mL/min/1.73m*2    Calcium 9.8 8.6 - 10.3 mg/dL    Albumin 4.0 3.4 - 5.0 g/dL    Alkaline Phosphatase 74 33 - 136 U/L    Total Protein 7.9 6.4 - 8.2 g/dL    AST 35 9 - 39 U/L    Bilirubin, Total 0.5 0.0 - 1.2 mg/dL    ALT 40 7 - 45 U/L   Magnesium   Result Value Ref Range    Magnesium 1.64 1.60 - 2.40 mg/dL   Troponin I, High Sensitivity, Initial   Result Value Ref Range    Troponin I, High Sensitivity 8 0 - 13 ng/L   D-dimer, VTE Exclusion   Result Value Ref Range    D-Dimer, Quantitative VTE Exclusion 926 (H) <=500 ng/mL FEU   Lipase   Result Value Ref Range    Lipase 84 (H) 9 - 82 U/L   Troponin, High Sensitivity, 1 Hour   Result Value Ref Range    Troponin I, High Sensitivity 9 0 - 13 ng/L   Sars-CoV-2 and Influenza A/B PCR   Result Value Ref Range    Flu A Result Not Detected Not Detected    Flu B Result Not Detected Not Detected    Coronavirus 2019, PCR Not Detected Not Detected     Assessment/Plan   Principal Problem:    Angina at rest (CMS/[facility])    Collin Lazar is a pleasant 73-year-old -American female with a history of hypertension, GERD, RA, very remote lung cancer who is presenting to the emergency department under the guidance of her PCP for several day history of epigastric pain refractory to at home remedies.  Patient currently has chest pain meeting 1 out of 3 criteria of cardiac chest pain with relief of nitroglycerin.  She does not have chest pain that is worse with exertion and does not get better with rest, therefore this is noncardiac chest pain, however given her age and gender, could have atypical presentation warranting further inpatient workup.  Will stratify risk factors for ACS with lipid panel, A1c, trend troponin, repeat ECHO.  Will also obtain orthostatic blood pressures given her lightheadedness at home.  She does have hypertensive urgency which could be contributing to her lightheadedness, and would make sense that this would get better with nitro.  Will continue her  home medications, monitor blood pressure and consider tighter blood pressure control.  Anticipate 1 midnight for restratification.  She does have an appointment with her PCP on Monday.    #ACS rule out: Noncardiac chest pain vs refractory GERD secondary to hiatal hernia  -Status post aspirin and nitroglycerin  -Cardiac troponin flat at 8, 9, 10  - reassuring EKGs  - Tele  -Risk stratify with lipid panel, A1c  -Lipitor 80mg  -Repeat echocardiogram  -Consider outpatient stress test  -Consider outpatient EGD to further evaluate hiatal hernia    #Orthostatic blood pressure  #Lightheadedness  #Hypertensive urgency (resolving)  - Nl Bps in ED in the last several hours  - S/p labetolol in ED  - orthostatic blood pressures positive (162/83, 71 bpm lying, 171/90, 69 bpm with sitting, 145/86, 81 bpm with standing), repeat in the AM.  - S/p 1 L bolus  - Monitor  - resumed CCB, coreg, K tablet  - consider increasing CCB for tighter BP control.    #Right Ovarian Cyst  - seen on CT  - needs Outpt follow up with US    #Leukopenia  - Appears chronic on prior lab work  - no further work up necessary    #GERD  No meds  - consider trial of PPI for 6 weeks    #RA  - continued home Leflunomide    F: none  EL replace K >4, Mg > 2  N: Cardiac  GI: none  DVTL lovenox, SCD  CODE: full  DISPO:  1 MN, pending work up.    To be discussed with attending,    Deysi Pham DO

## 2024-04-02 NOTE — PROGRESS NOTES
Spiritual Care Visit    Clinical Encounter Type  Visited With: Patient  Routine Visit: Introduction  Continue Visiting: No                                            Taxonomy  Intended Effects: Preserve dignity and respect, Jada affirmation, Promote sense of peace, Helping someone feel comforted  Methods: Offer spiritual/Jainism support  Interventions: Share words of hope and inspiration, Hiram    Patient shared she goes to the [employer] in [place].   was a supportive presence and listened to her story.   prayed at her request.

## 2024-04-02 NOTE — PROGRESS NOTES
Collin Lazar is a 73 y.o. female on day 1 of admission presenting with Angina at rest (CMS/HCC).      Subjective   NAEON. Feels better today. Denies fever, chills, nausea, shortness of breath, cough, abdominal pain, vomiting, diarrhea, chest pain, weakness    Reports palpitations    Objective     Last Recorded Vitals  BP (!) 162/96 (BP Location: Right leg, Patient Position: Lying)   Pulse 72   Temp 36.4 °C (97.5 °F) (Temporal)   Resp 18   Wt 81 kg (178 lb 9.2 oz)   SpO2 96%   Intake/Output last 3 Shifts:    Intake/Output Summary (Last 24 hours) at 4/2/2024 1428  Last data filed at 4/2/2024 1411  Gross per 24 hour   Intake 1240 ml   Output --   Net 1240 ml       Admission Weight  Weight: 81.6 kg (180 lb) (04/01/24 1316)    Daily Weight  04/01/24 : 81 kg (178 lb 9.2 oz)    Image Results  Electrocardiogram, 12-lead PRN ACS symptoms  Normal sinus rhythm  Left axis deviation  Moderate voltage criteria for LVH, may be normal variant  Abnormal ECG  When compared with ECG of 22-OCT-2023 14:51,  No significant change was found  ECG 12 lead  Normal sinus rhythm  Left axis deviation  Moderate voltage criteria for LVH, may be normal variant  Cannot rule out Septal infarct , age undetermined  Abnormal ECG  When compared with ECG of 01-APR-2024 13:13, (unconfirmed)  Minimal criteria for Septal infarct are now Present  Nonspecific T wave abnormality, worse in Anterolateral leads      Physical Exam  Vitals reviewed.   Constitutional:       General: She is not in acute distress.     Appearance: Normal appearance. She is not ill-appearing, toxic-appearing or diaphoretic.   HENT:      Head: Normocephalic and atraumatic.      Right Ear: External ear normal.      Left Ear: External ear normal.      Nose: Nose normal.      Mouth/Throat:      Mouth: Mucous membranes are moist.      Pharynx: Oropharynx is clear.   Eyes:      Extraocular Movements: Extraocular movements intact.      Pupils: Pupils are equal, round, and reactive  to light.   Cardiovascular:      Rate and Rhythm: Normal rate and regular rhythm.      Pulses: Normal pulses.      Heart sounds: Normal heart sounds. No murmur heard.     No friction rub. No gallop.   Pulmonary:      Effort: Pulmonary effort is normal. No respiratory distress.      Breath sounds: Normal breath sounds. No wheezing, rhonchi or rales.   Abdominal:      General: Abdomen is flat. Bowel sounds are normal. There is no distension.      Palpations: Abdomen is soft. There is no mass.      Tenderness: There is abdominal tenderness in the epigastric area.      Hernia: No hernia is present.   Musculoskeletal:         General: No swelling, tenderness, deformity or signs of injury. Normal range of motion.      Cervical back: Normal range of motion and neck supple.      Right lower leg: No edema.      Left lower leg: No edema.   Skin:     General: Skin is warm and dry.      Capillary Refill: Capillary refill takes less than 2 seconds.   Neurological:      General: No focal deficit present.      Mental Status: She is alert and oriented to person, place, and time. Mental status is at baseline.   Psychiatric:         Mood and Affect: Mood normal.         Behavior: Behavior normal.         Relevant Results    Scheduled medications  amLODIPine, 2.5 mg, oral, Daily  atorvastatin, 80 mg, oral, Nightly  carvedilol, 6.25 mg, oral, BID  enoxaparin, 40 mg, subcutaneous, q24h  latanoprost, 1 drop, Both Eyes, Nightly  leflunomide, 20 mg, oral, Daily  magnesium oxide, 400 mg, oral, Nightly  magnesium sulfate, 4 g, intravenous, Once  pantoprazole, 40 mg, oral, Nightly  polyethylene glycol, 17 g, oral, Daily  potassium chloride CR, 20 mEq, oral, Daily      Continuous medications     PRN medications    Results for orders placed or performed during the hospital encounter of 04/01/24 (from the past 24 hour(s))   CBC and Auto Differential   Result Value Ref Range    WBC 3.0 (L) 4.4 - 11.3 x10*3/uL    nRBC 0.0 0.0 - 0.0 /100 WBCs     RBC 4.05 4.00 - 5.20 x10*6/uL    Hemoglobin 12.6 12.0 - 16.0 g/dL    Hematocrit 37.4 36.0 - 46.0 %    MCV 92 80 - 100 fL    MCH 31.1 26.0 - 34.0 pg    MCHC 33.7 32.0 - 36.0 g/dL    RDW 11.8 11.5 - 14.5 %    Platelets 164 150 - 450 x10*3/uL    Neutrophils % 30.9 40.0 - 80.0 %    Immature Granulocytes %, Automated 0.3 0.0 - 0.9 %    Lymphocytes % 55.3 13.0 - 44.0 %    Monocytes % 12.5 2.0 - 10.0 %    Eosinophils % 0.7 0.0 - 6.0 %    Basophils % 0.3 0.0 - 2.0 %    Neutrophils Absolute 0.94 (L) 1.60 - 5.50 x10*3/uL    Immature Granulocytes Absolute, Automated 0.01 0.00 - 0.50 x10*3/uL    Lymphocytes Absolute 1.68 0.80 - 3.00 x10*3/uL    Monocytes Absolute 0.38 0.05 - 0.80 x10*3/uL    Eosinophils Absolute 0.02 0.00 - 0.40 x10*3/uL    Basophils Absolute 0.01 0.00 - 0.10 x10*3/uL   Comprehensive Metabolic Panel   Result Value Ref Range    Glucose 126 (H) 74 - 99 mg/dL    Sodium 139 136 - 145 mmol/L    Potassium 3.3 (L) 3.5 - 5.3 mmol/L    Chloride 103 98 - 107 mmol/L    Bicarbonate 29 21 - 32 mmol/L    Anion Gap 10 10 - 20 mmol/L    Urea Nitrogen 11 6 - 23 mg/dL    Creatinine 0.64 0.50 - 1.05 mg/dL    eGFR >90 >60 mL/min/1.73m*2    Calcium 9.8 8.6 - 10.3 mg/dL    Albumin 4.0 3.4 - 5.0 g/dL    Alkaline Phosphatase 74 33 - 136 U/L    Total Protein 7.9 6.4 - 8.2 g/dL    AST 35 9 - 39 U/L    Bilirubin, Total 0.5 0.0 - 1.2 mg/dL    ALT 40 7 - 45 U/L   Magnesium   Result Value Ref Range    Magnesium 1.64 1.60 - 2.40 mg/dL   Troponin I, High Sensitivity, Initial   Result Value Ref Range    Troponin I, High Sensitivity 8 0 - 13 ng/L   D-dimer, VTE Exclusion   Result Value Ref Range    D-Dimer, Quantitative VTE Exclusion 926 (H) <=500 ng/mL FEU   Lipase   Result Value Ref Range    Lipase 84 (H) 9 - 82 U/L   Hemoglobin A1c   Result Value Ref Range    Hemoglobin A1C 4.3 see below %    Estimated Average Glucose 77 Not Established mg/dL   Troponin, High Sensitivity, 1 Hour   Result Value Ref Range    Troponin I, High Sensitivity 9 0 -  13 ng/L   ECG 12 lead   Result Value Ref Range    Ventricular Rate 62 BPM    Atrial Rate 62 BPM    OR Interval 178 ms    QRS Duration 108 ms    QT Interval 396 ms    QTC Calculation(Bazett) 401 ms    P Axis 64 degrees    R Axis -36 degrees    T Axis 26 degrees    QRS Count 10 beats    Q Onset 208 ms    P Onset 119 ms    P Offset 178 ms    T Offset 406 ms    QTC Fredericia 400 ms   Sars-CoV-2 and Influenza A/B PCR   Result Value Ref Range    Flu A Result Not Detected Not Detected    Flu B Result Not Detected Not Detected    Coronavirus 2019, PCR Not Detected Not Detected   Troponin I, High Sensitivity, Initial   Result Value Ref Range    Troponin I, High Sensitivity 10 0 - 13 ng/L   CBC   Result Value Ref Range    WBC 3.8 (L) 4.4 - 11.3 x10*3/uL    nRBC 0.0 0.0 - 0.0 /100 WBCs    RBC 3.99 (L) 4.00 - 5.20 x10*6/uL    Hemoglobin 12.5 12.0 - 16.0 g/dL    Hematocrit 37.3 36.0 - 46.0 %    MCV 94 80 - 100 fL    MCH 31.3 26.0 - 34.0 pg    MCHC 33.5 32.0 - 36.0 g/dL    RDW 11.9 11.5 - 14.5 %    Platelets 173 150 - 450 x10*3/uL   Lipid Panel   Result Value Ref Range    Cholesterol 118 0 - 199 mg/dL    HDL-Cholesterol 32.3 mg/dL    Cholesterol/HDL Ratio 3.7     LDL Calculated 60 <=99 mg/dL    VLDL 25 0 - 40 mg/dL    Triglycerides 127 0 - 149 mg/dL    Non HDL Cholesterol 86 0 - 149 mg/dL   Renal function panel   Result Value Ref Range    Glucose 96 74 - 99 mg/dL    Sodium 140 136 - 145 mmol/L    Potassium 3.6 3.5 - 5.3 mmol/L    Chloride 106 98 - 107 mmol/L    Bicarbonate 27 21 - 32 mmol/L    Anion Gap 11 10 - 20 mmol/L    Urea Nitrogen 12 6 - 23 mg/dL    Creatinine 0.71 0.50 - 1.05 mg/dL    eGFR >90 >60 mL/min/1.73m*2    Calcium 9.2 8.6 - 10.3 mg/dL    Phosphorus 3.4 2.5 - 4.9 mg/dL    Albumin 3.7 3.4 - 5.0 g/dL   Magnesium   Result Value Ref Range    Magnesium 1.59 (L) 1.60 - 2.40 mg/dL   Troponin, High Sensitivity, 1 Hour   Result Value Ref Range    Troponin I, High Sensitivity 10 0 - 13 ng/L   Nuclear Stress Test   Result  Value Ref Range    Predicted METS 5.5 METS     Electrocardiogram, 12-lead PRN ACS symptoms    Result Date: 4/2/2024  Normal sinus rhythm Left axis deviation Moderate voltage criteria for LVH, may be normal variant Abnormal ECG When compared with ECG of 22-OCT-2023 14:51, No significant change was found    ECG 12 lead    Result Date: 4/2/2024  Normal sinus rhythm Left axis deviation Moderate voltage criteria for LVH, may be normal variant Cannot rule out Septal infarct , age undetermined Abnormal ECG When compared with ECG of 01-APR-2024 13:13, (unconfirmed) Minimal criteria for Septal infarct are now Present Nonspecific T wave abnormality, worse in Anterolateral leads    CT abdomen pelvis w IV contrast    Result Date: 4/1/2024  Interpreted By:  Carlos Triana, STUDY: CT ABDOMEN PELVIS W IV CONTRAST; CT ANGIO CHEST FOR PULMONARY EMBOLISM;  4/1/2024 6:14 pm   INDICATION: Signs/Symptoms:epigastric burning pain; Signs/Symptoms:elevated dimer, r/o pe.   COMPARISON: Selected comparison examinations as far back as July 28, 2011 chest CT including October 15, 2019, and June 29, 2022 chest CT examinations. May 9, 2023 renal ultrasound and April 1, 2024 chest radiograph.   ACCESSION NUMBER(S): [id number]; [id number]   ORDERING CLINICIAN: ALLYSON FERNANDEZ   TECHNIQUE: Helical data acquisition of the chest was obtained after intravenous administration of 75 mL of Omnipaque 350, as per PE protocol for the chest portion and routine postcontrast CT abdomen and pelvis.. Images were reformatted in coronal and sagittal planes. Axial and coronal maximum intensity projection (MIP) images were created and reviewed.   FINDINGS: POTENTIAL LIMITATIONS OF THE STUDY: Technically adequate.   HEART AND VESSELS: There are no discrete filling defects within main pulmonary artery and its branches to suggest acute pulmonary embolism. Main pulmonary artery and its branches are normal in caliber.   The thoracic aorta normal in  course and caliber.Similar degree of atherosclerosis thoracic aorta and branch vessels.Although, the study is not tailored for evaluation of aorta, there is no definite evidence of acute aortic pathology. Mild coronary artery calcifications are seen. Please note,the study is not optimized for evaluation of coronary arteries.   The cardiac chambers are not enlarged.   There is no pericardial effusion seen.   MEDIASTINUM AND JOSE RAMON, LOWER NECK AND AXILLA: The visualized thyroid gland is within normal limits. No evidence of thoracic lymphadenopathy by CT criteria. Similar appearance of the esophagus.   LUNGS AND AIRWAYS: Similar appearance of the lungs including areas of reticular and coalescent bandlike thickening with traction bronchiectasis most pronounced posterior right mid chest most compatible with scar and atelectasis. Subsegmental atelectasis left base medial is newly seen since June 29, 2022.     CHEST WALL AND LOWER NECK: Unremarkable.   ABDOMEN:   LIVER: Similar appearance. No detected mass.   BILE DUCTS: Normal caliber.   GALLBLADDER: No calcified stones. No wall thickening.   PANCREAS: The pancreas appears unremarkable without evidence of ductal dilatation or masses.   SPLEEN: The spleen is normal in size without focal lesions.   ADRENAL GLANDS: Within normal limits.   KIDNEYS AND URETERS: The kidneys demonstrate normal morphology without hydronephrosis. There are scattered bilateral probably benign sharply marginated homogeneous hypodense renal lesions most compatible with cysts although some are too small to characterize including dominant 4 cm transverse diameter fluid density lesion upper portion left kidney. No hydroureteronephrosis or nephroureterolithiasis is identified.   PELVIS:   BLADDER: The urinary bladder is decompressed, limited for evaluation.   REPRODUCTIVE ORGANS: Mild asymmetric prominence of the right ovary including 20 x 16 mm asymmetric nodular focus with density measurement slightly  greater than simple fluid series 501, image 125 which is potentially due to a cyst containing debris although nonspecific.   BOWEL: The stomach and bowel are normal caliber without surrounding inflammatory changes. Normal caliber appendix. There is mild colonic diverticulosis. There is a probably benign cyst or diverticulum inseparable from the undersurface of the sigmoid colon measuring 13 mm transverse diameter coronal series 502, image 42   VESSELS: Scattered atherosclerosis without detected aneurysm.   PERITONEUM/RETROPERITONEUM/LYMPH NODES: No ascites or free air, no fluid collection. No suspicious lymph nodes. Borderline size hepatic node series 501, image 42 measuring 10 mm AP diameter measurably unchanged as far back as October 15, 2019.   BONE AND SOFT TISSUE: No suspicious osseous lesions. There are chronic right-sided rib deformities. Multilevel discogenic degenerative changes involving the lumbar spine. No remarkable body wall abnormality.       1. No evidence of acute pulmonary embolism. 2. Newly seen subsegmental atelectasis left lung base. 3. Nonspecific fullness of the right ovary which is potentially due to a cyst containing debris although nonspecific and poorly evaluated. Depending on clinical concern pelvic ultrasound correlation and/or MRI recommended. 4. Colonic diverticulosis. 5. Probably benign small cyst or diverticulum subjacent to the sigmoid colon. 6. Bilateral probably benign renal homogeneous hypodensities most compatible with cysts although some are too small to characterize. 7. Atherosclerosis.     MACRO: Incidental Finding:  A small focal renal lesion seen, too small to further characterize, but given the homogeneous attenuation likely represent benign etiology/simple cyst.  (**-YCF-**)   Instructions:  No further workup is needed. (Gaviota BR, Rich SG, Adriana NM, et al. Management of the Incidental Renal Mass on CT: A White Paper of the ACR Incidental Findings Committee. J Am  Evelia Radiol. 2018;15(2):264-273.) [email or web address].[email or web address]   Signed by: Carlos Triana 4/1/2024 6:52 PM Dictation workstation:   [id number]    CT angio chest for pulmonary embolism    Result Date: 4/1/2024  Interpreted By:  Carlos Triana, STUDY: CT ABDOMEN PELVIS W IV CONTRAST; CT ANGIO CHEST FOR PULMONARY EMBOLISM;  4/1/2024 6:14 pm   INDICATION: Signs/Symptoms:epigastric burning pain; Signs/Symptoms:elevated dimer, r/o pe.   COMPARISON: Selected comparison examinations as far back as July 28, 2011 chest CT including October 15, 2019, and June 29, 2022 chest CT examinations. May 9, 2023 renal ultrasound and April 1, 2024 chest radiograph.   ACCESSION NUMBER(S): [id number]; [id number]   ORDERING CLINICIAN: ALLYSON FERNANDEZ   TECHNIQUE: Helical data acquisition of the chest was obtained after intravenous administration of 75 mL of Omnipaque 350, as per PE protocol for the chest portion and routine postcontrast CT abdomen and pelvis.. Images were reformatted in coronal and sagittal planes. Axial and coronal maximum intensity projection (MIP) images were created and reviewed.   FINDINGS: POTENTIAL LIMITATIONS OF THE STUDY: Technically adequate.   HEART AND VESSELS: There are no discrete filling defects within main pulmonary artery and its branches to suggest acute pulmonary embolism. Main pulmonary artery and its branches are normal in caliber.   The thoracic aorta normal in course and caliber.Similar degree of atherosclerosis thoracic aorta and branch vessels.Although, the study is not tailored for evaluation of aorta, there is no definite evidence of acute aortic pathology. Mild coronary artery calcifications are seen. Please note,the study is not optimized for evaluation of coronary arteries.   The cardiac chambers are not enlarged.   There is no pericardial effusion seen.   MEDIASTINUM AND JOSE RAMON, LOWER NECK AND AXILLA: The visualized thyroid gland is within normal limits. No evidence of  thoracic lymphadenopathy by CT criteria. Similar appearance of the esophagus.   LUNGS AND AIRWAYS: Similar appearance of the lungs including areas of reticular and coalescent bandlike thickening with traction bronchiectasis most pronounced posterior right mid chest most compatible with scar and atelectasis. Subsegmental atelectasis left base medial is newly seen since June 29, 2022.     CHEST WALL AND LOWER NECK: Unremarkable.   ABDOMEN:   LIVER: Similar appearance. No detected mass.   BILE DUCTS: Normal caliber.   GALLBLADDER: No calcified stones. No wall thickening.   PANCREAS: The pancreas appears unremarkable without evidence of ductal dilatation or masses.   SPLEEN: The spleen is normal in size without focal lesions.   ADRENAL GLANDS: Within normal limits.   KIDNEYS AND URETERS: The kidneys demonstrate normal morphology without hydronephrosis. There are scattered bilateral probably benign sharply marginated homogeneous hypodense renal lesions most compatible with cysts although some are too small to characterize including dominant 4 cm transverse diameter fluid density lesion upper portion left kidney. No hydroureteronephrosis or nephroureterolithiasis is identified.   PELVIS:   BLADDER: The urinary bladder is decompressed, limited for evaluation.   REPRODUCTIVE ORGANS: Mild asymmetric prominence of the right ovary including 20 x 16 mm asymmetric nodular focus with density measurement slightly greater than simple fluid series 501, image 125 which is potentially due to a cyst containing debris although nonspecific.   BOWEL: The stomach and bowel are normal caliber without surrounding inflammatory changes. Normal caliber appendix. There is mild colonic diverticulosis. There is a probably benign cyst or diverticulum inseparable from the undersurface of the sigmoid colon measuring 13 mm transverse diameter coronal series 502, image 42   VESSELS: Scattered atherosclerosis without detected aneurysm.    PERITONEUM/RETROPERITONEUM/LYMPH NODES: No ascites or free air, no fluid collection. No suspicious lymph nodes. Borderline size hepatic node series 501, image 42 measuring 10 mm AP diameter measurably unchanged as far back as October 15, 2019.   BONE AND SOFT TISSUE: No suspicious osseous lesions. There are chronic right-sided rib deformities. Multilevel discogenic degenerative changes involving the lumbar spine. No remarkable body wall abnormality.       1. No evidence of acute pulmonary embolism. 2. Newly seen subsegmental atelectasis left lung base. 3. Nonspecific fullness of the right ovary which is potentially due to a cyst containing debris although nonspecific and poorly evaluated. Depending on clinical concern pelvic ultrasound correlation and/or MRI recommended. 4. Colonic diverticulosis. 5. Probably benign small cyst or diverticulum subjacent to the sigmoid colon. 6. Bilateral probably benign renal homogeneous hypodensities most compatible with cysts although some are too small to characterize. 7. Atherosclerosis.     MACRO: Incidental Finding:  A small focal renal lesion seen, too small to further characterize, but given the homogeneous attenuation likely represent benign etiology/simple cyst.  (**-YCF-**)   Instructions:  No further workup is needed. (Gaviota BR, Rich SG, Adriana NM, et al. Management of the Incidental Renal Mass on CT: A White Paper of the ACR Incidental Findings Committee. J Am Evelia Radiol. 2018;15(2):264-273.) [email or web address].[email or web address].1   Signed by: Carlos Triana 4/1/2024 6:52 PM Dictation workstation:   [id number]    XR chest 1 view    Result Date: 4/1/2024  Interpreted By:  Jermain Chopra, STUDY: XR CHEST 1 VIEW   INDICATION: Signs/Symptoms:Chest Pain.   COMPARISON: October 22, 2023   CT chest June 29, 2022     ACCESSION NUMBER(S): [id number]   ORDERING CLINICIAN: ALLYSON TURK   FINDINGS:   Masslike area of right upper lobe scarring with surgical clips in the right hilar  region grossly unchanged from the prior study. Postsurgical changes of the right chest wall again noted also unchanged.   There is no new consolidation or edema. No effusion or pneumothorax.       Postsurgical changes right lung with some masslike area of scarring in the right upper lobe unchanged from the prior study.   No evidence of acute intrathoracic abnormality.   Signed by: Jermain Chopra 4/1/2024 3:33 PM Dictation workstation:   [id number]    ECG 12 lead    Result Date: 4/1/2024  Normal sinus rhythm Left axis deviation Moderate voltage criteria for LVH, may be normal variant Abnormal ECG When compared with ECG of 22-OCT-2023 14:51, No significant change was found    Vascular US Lower Extremity Venous Duplex Left    Result Date: 3/15/2024            [facility] [place] [address] [place] [address]     Tel [phone] Fax [phone]  Vascular Lab Report  VASC US LOWER EXTREMITY VENOUS DUPLEX LEFT Patient Name:      TUYETYOLANDA CRAWFORD  Reading Physician: [id number] Griselda Ortiz MD Study Date:        3/10/2024           Ordering Provider: [id number] JAVY MONAE MRN/PID:           [id number]            Fellow: Accession#:        [id number]        Technologist:      Elo Ohara RVT Date of Birth/Age: 1950 / 73      Technologist 2:                    years Gender:            F                   Encounter#:        [id number] Admission Status:  Emergency           Location           [facility]                                        Performed:  Diagnosis/ICD: Pain in left leg-M79.605 CPT Codes:     60699 Peripheral venous duplex scan for DVT Limited  Pertinent History: Leg pain.  CONCLUSIONS: Right Lower Venous: Right common femoral vein is negative for deep vein thrombus. Left Lower Venous: No evidence of acute deep vein thrombus visualized in the left lower extremity. Poor visualization of calf veins in gray scale. Calf veins  visualized in segments with color doppler. Additional Findings: Technically difficult exam due to body habitus.  Comparison: Compared with study from 12/1/2022, no significant change.  Imaging & Doppler Findings:  Right Compressible Thrombus        Flow CFV       Yes        None   Spontaneous/Phasic  Left                  Compress Thrombus        Flow Distal External Iliac   Yes      None   Spontaneous/Phasic CFV                     Yes      None   Spontaneous/Phasic PFV                     Yes      None FV Proximal             Yes      None   Spontaneous/Phasic FV Mid                  Yes      None FV Distal               Yes      None Popliteal               Yes      None   Spontaneous/Phasic Peroneal                Yes      None PTV                     Yes      None  [id number] Griselda Ortiz MD Electronically signed by [id number] Griselda Ortiz MD on 3/15/2024 at 3:57:57 PM  ** Final **     XR lumbar spine 2-3 views    Result Date: 3/14/2024  Interpreted By:  Iwona Quintana, STUDY: Lumbar spine, three views.   INDICATION: Signs/Symptoms:Lumbar radiculopathy.   COMPARISON: 05/04/2020   ACCESSION NUMBER(S): [id number]   ORDERING CLINICIAN: MELQUIADES OLIVEROS   FINDINGS: Mild levoscoliosis centered in the upper lumbar region. Disc heights are maintained. Mild spondylosis at L1-2 and L2-3 with small anterior osteophytes. Moderate facet joint arthropathy noted at L3-4, L4-5, and L5-S1. Mild facet joint arthropathy noted at L2-3. Atherosclerosis of the abdominal aorta noted. Vertebral body heights are preserved. Posterior elements are intact.       1. Moderate facet joint arthropathy from L3-4 to L5-S1. Mild L1-2 and L2-3 spondylosis.   MACRO: None.   Signed by: Iwona Quintana 3/14/2024 10:58 PM Dictation workstation:   [id number]               Assessment/Plan                  Principal Problem:    Angina at rest (CMS/HCC)    Collin Lazar is a pleasant 73-year-old -American female with a history of hypertension,  GERD, RA, very remote lung cancer who is presenting to the emergency department under the guidance of her PCP for several day history of epigastric pain refractory to at home remedies concerning for atypical chest pain.    #Atypical chest pain  #ACS rule out  - History concerning for cardiac etiology, possibly secondary to hiatal hernia  -Status post aspirin and nitroglycerin in ED  -troponin stable 8, 9, 10  -Continue to follow with Tele  -A1c 4.3  - lipid panel reviewed  -Lipitor 80mg  -Cardiac stress test read pending     #Orthostatic blood pressure  #Lightheadedness  #Hypertensive urgency (resolving)  - Nl Bps in ED in the last several hours  - S/p labetolol in ED  - orthostatic blood pressures positive (162/83, 71 bpm lying, 171/90, 69 bpm with sitting, 145/86, 81 bpm with standing)  - Repeat orthostats pending  - resumed CCB, coreg, K tablet  - consider increasing CCB for tighter BP control.     #Right Ovarian Cyst  - seen on CT  - needs Outpt follow up with US     #Leukopenia  - Appears chronic on prior lab work  - no further work up necessary     #GERD  - continue ppi    #RA  - continued home Leflunomide     F: none  EL replace K >4, Mg > 2  N: Cardiac  GI: none  DVTL lovenox, SCD  CODE: full code  DISPO:  1 MN, pending work up.    Discussed with attending,  Bill Elam D.O.  PGY-1 Internal medicine resident

## 2024-04-02 NOTE — NURSING NOTE
Dr Pham notified that patients blood pressure dropped from sitting to standing. Patient was asymptomatic with drop. Patient still complaining of 4/10 epigastric pain. Protonix given.

## 2024-04-02 NOTE — PROGRESS NOTES
Attempted to meet with patient at bedside- patient off floor for a stress test per nursing. Per nursing and MD, patient is from home and plans to return home at d/c. Will attempt assessment at another time.       Amaya Bermudez RN

## 2024-04-02 NOTE — DISCHARGE INSTRUCTIONS
You were admitted to the hospital with chest pain. You had a cardiac stress test. The results were normal. Your heart is working normally and there do not appear to be any issues with blood flow to your heart muscle. I have increased your Carvedilol to TWICE DAILY. Please follow up with your PCP and with Dr. Albert (Heart doctor) for continued management of your blood pressure and cardiac risk factors.

## 2024-04-02 NOTE — NURSING NOTE
0813: pt off floor to nuc med at this time.    1258: pt. Returned to 3N at this time. Vitals taken and /96 AM blood pressure meds given at this time and Dr. Cordova notified and on way to come see patient     1800: Pt. To be discharged home per MD. Pt. Had no acute changes throughout this shift and no c/o SOB or CP. Pt. Was able to ambulate to the bathroom this shift with staff assistance. Vitals obtained. IV removed. All belongings taken with patient at this time. Tele removed. Pt. Awaiting friend to come up to floor to [place] home

## 2024-04-03 NOTE — DISCHARGE SUMMARY
Discharge Diagnosis  Angina at rest (CMS/[facility])    Issues Requiring Follow-Up  Establish care with Cardiology    Discharge Meds     Your medication list        CHANGE how you take these medications        Instructions Last Dose Given Next Dose Due   carvedilol 6.25 mg tablet  Commonly known as: Coreg  What changed: when to take this      Take 1 tablet (6.25 mg) by mouth 2 times a day with meals.              CONTINUE taking these medications        Instructions Last Dose Given Next Dose Due   amLODIPine 2.5 mg tablet  Commonly known as: Norvasc           atorvastatin 40 mg tablet  Commonly known as: Lipitor           latanoprost 0.005 % ophthalmic solution  Commonly known as: Xalatan           leflunomide 10 mg tablet  Commonly known as: Arava           magnesium oxide 400 mg tablet  Commonly known as: Mag-Ox           potassium chloride CR 20 mEq ER tablet  Commonly known as: Klor-Con M20                  STOP taking these medications      lidocaine 5 % patch  Commonly known as: Lidoderm        tiZANidine 2 mg tablet  Commonly known as: Zanaflex                  Where to Get Your Medications        These medications were sent to [id number] [address] AT [place] [address]      Phone: [phone]   carvedilol 6.25 mg tablet         Test Results Pending At Discharge  Pending Labs       No current pending labs.            Hospital Course  Patient admitted with chief complaint of intermediate risk chest pain with normal EKG and two sets of negative enzymes. Underwent nuclear stress testing with no e/o stress induced ischemia and no further episodes of chest pain. She was discharged on increased dose of Carvedilol twice daily as opposed to once daily, and will follow up with PCP and establish care with Cardiology.    30 minutes was spent in discharge planning, >50% of which was spent in direct patient counseling regarding hospital course, medication  changes, aftercare instructions, and follow up.      Pertinent Physical Exam At Time of Discharge  Well developed female in no acute distress  MMM.  AO x 3  Cardiac exam with RRR, normal S1 and S2, no m/r/g.  Lungs clear bilaterally without wheezes or rales  Abdomen is soft, NTND, no rebound or guarding  No LE edema, WWP  Skin is warm and dry with no lesions    Outpatient Follow-Up  Future Appointments   Date Time Provider Department Center   4/23/2024  1:00 PM [facility] [id number] PAIN MGMT PROCEDURE ROOM [id number] None   6/12/2024  2:00 PM Juan Anguiano MD [id number] [place]         Dominique Cordova MD

## 2024-04-15 ENCOUNTER — HOSPITAL ENCOUNTER (OUTPATIENT)
Dept: RADIOLOGY | Facility: HOSPITAL | Age: 74
Discharge: HOME | End: 2024-04-15
Payer: MEDICARE

## 2024-04-15 DIAGNOSIS — N83.9 NONINFLAMMATORY DISORDER OF OVARY, FALLOPIAN TUBE AND BROAD LIGAMENT, UNSPECIFIED: ICD-10-CM

## 2024-04-15 PROCEDURE — 76856 US EXAM PELVIC COMPLETE: CPT

## 2024-04-15 PROCEDURE — 76830 TRANSVAGINAL US NON-OB: CPT | Performed by: RADIOLOGY

## 2024-04-15 PROCEDURE — 76857 US EXAM PELVIC LIMITED: CPT | Performed by: RADIOLOGY

## 2024-04-22 PROBLEM — Z86.79 HISTORY OF HYPERTENSION: Status: ACTIVE | Noted: 2024-04-22

## 2024-04-22 PROBLEM — N28.1 RENAL CYST: Status: ACTIVE | Noted: 2024-04-22

## 2024-04-22 PROBLEM — S83.90XA KNEE SPRAIN: Status: ACTIVE | Noted: 2024-04-22

## 2024-04-22 PROBLEM — K11.6 PAROTID CYST: Status: ACTIVE | Noted: 2024-04-22

## 2024-04-22 PROBLEM — M51.369 DDD (DEGENERATIVE DISC DISEASE), LUMBAR: Status: ACTIVE | Noted: 2024-04-22

## 2024-04-22 PROBLEM — S39.012A LUMBAR STRAIN: Status: ACTIVE | Noted: 2024-04-22

## 2024-04-22 PROBLEM — L93.0 LUPUS ERYTHEMATOSUS: Status: ACTIVE | Noted: 2022-12-01

## 2024-04-22 PROBLEM — M47.816 SPONDYLOSIS OF LUMBAR SPINE: Status: ACTIVE | Noted: 2022-10-05

## 2024-04-22 PROBLEM — E78.5 HLD (HYPERLIPIDEMIA): Status: ACTIVE | Noted: 2023-03-23

## 2024-04-22 PROBLEM — Z85.118 HISTORY OF LUNG CANCER: Status: ACTIVE | Noted: 2023-03-23

## 2024-04-22 PROBLEM — R05.9 COUGH, UNSPECIFIED: Status: ACTIVE | Noted: 2022-06-29

## 2024-04-22 PROBLEM — S80.02XA CONTUSION OF KNEE, LEFT: Status: ACTIVE | Noted: 2024-04-22

## 2024-04-22 PROBLEM — M70.62 TROCHANTERIC BURSITIS OF LEFT HIP: Status: ACTIVE | Noted: 2024-03-27

## 2024-04-22 PROBLEM — R06.02 SHORTNESS OF BREATH: Status: ACTIVE | Noted: 2024-04-22

## 2024-04-22 PROBLEM — N94.9 NONINFLAMMATORY DISORDER OF THE FEMALE GENITAL ORGANS: Status: ACTIVE | Noted: 2024-04-22

## 2024-04-22 PROBLEM — K21.9 GASTRO-ESOPHAGEAL REFLUX DISEASE WITHOUT ESOPHAGITIS: Status: ACTIVE | Noted: 2022-06-29

## 2024-04-22 PROBLEM — M51.36 DDD (DEGENERATIVE DISC DISEASE), LUMBAR: Status: ACTIVE | Noted: 2024-04-22

## 2024-04-22 PROBLEM — S40.011A CONTUSION OF RIGHT SHOULDER: Status: ACTIVE | Noted: 2022-09-21

## 2024-04-22 PROBLEM — M79.7 FIBROMYALGIA: Status: ACTIVE | Noted: 2022-12-14

## 2024-04-22 PROBLEM — M54.50 LOW BACK PAIN: Status: ACTIVE | Noted: 2022-02-11

## 2024-04-22 PROBLEM — R04.0 EPISTAXIS: Status: ACTIVE | Noted: 2024-04-22

## 2024-04-22 RX ORDER — ENOXAPARIN SODIUM 100 MG/ML
40 INJECTION SUBCUTANEOUS
COMMUNITY
Start: 2024-04-01

## 2024-04-22 RX ORDER — GABAPENTIN 100 MG/1
CAPSULE ORAL
COMMUNITY
Start: 2022-03-24

## 2024-04-22 RX ORDER — DOXYCYCLINE 100 MG/1
CAPSULE ORAL
COMMUNITY
Start: 2023-07-19

## 2024-04-22 RX ORDER — PANTOPRAZOLE SODIUM 40 MG/1
TABLET, DELAYED RELEASE ORAL
COMMUNITY
Start: 2024-04-01

## 2024-04-22 RX ORDER — ALBUTEROL SULFATE 90 UG/1
2 AEROSOL, METERED RESPIRATORY (INHALATION) 4 TIMES DAILY PRN
COMMUNITY
Start: 2022-08-25

## 2024-04-22 RX ORDER — NIRMATRELVIR AND RITONAVIR 300-100 MG
KIT ORAL
COMMUNITY
Start: 2023-07-11

## 2024-04-22 RX ORDER — ADALIMUMAB 40MG/0.8ML
KIT SUBCUTANEOUS
COMMUNITY

## 2024-04-22 RX ORDER — FAMOTIDINE 20 MG/1
20 TABLET, FILM COATED ORAL 2 TIMES DAILY
COMMUNITY
Start: 2023-10-17 | End: 2023-10-31 | Stop reason: WASHOUT

## 2024-04-22 RX ORDER — NITROFURANTOIN MACROCRYSTALS 50 MG/1
50 CAPSULE ORAL
COMMUNITY

## 2024-04-22 RX ORDER — DIAZEPAM 10 MG/1
TABLET ORAL
COMMUNITY
Start: 2024-04-10

## 2024-04-22 RX ORDER — HYDROXYCHLOROQUINE SULFATE 200 MG/1
400 TABLET, FILM COATED ORAL
COMMUNITY
Start: 2022-01-11 | End: 2024-10-19

## 2024-04-22 RX ORDER — DICLOFENAC POTASSIUM 50 MG/1
50 TABLET, FILM COATED ORAL EVERY 12 HOURS
COMMUNITY

## 2024-04-22 RX ORDER — POLYETHYLENE GLYCOL 3350 17 G/17G
17 POWDER, FOR SOLUTION ORAL
COMMUNITY
Start: 2024-04-01

## 2024-04-22 RX ORDER — CEFUROXIME AXETIL 500 MG/1
500 TABLET ORAL 2 TIMES DAILY
COMMUNITY
Start: 2023-05-30

## 2024-04-22 RX ORDER — CLINDAMYCIN PHOSPHATE 10 MG/G
GEL TOPICAL 2 TIMES DAILY
COMMUNITY
Start: 2023-07-20

## 2024-04-22 RX ORDER — FLUTICASONE PROPIONATE 50 MCG
SPRAY, SUSPENSION (ML) NASAL
COMMUNITY

## 2024-04-22 RX ORDER — SUCRALFATE 1 G/1
TABLET ORAL
COMMUNITY
Start: 2023-10-17

## 2024-04-22 RX ORDER — NYSTATIN 100000 [USP'U]/ML
SUSPENSION ORAL
COMMUNITY
Start: 2023-12-20

## 2024-04-22 RX ORDER — ACYCLOVIR 50 MG/G
OINTMENT TOPICAL
COMMUNITY
Start: 2023-09-18

## 2024-04-22 RX ORDER — MIRABEGRON 50 MG/1
50 TABLET, EXTENDED RELEASE ORAL
COMMUNITY
Start: 2023-06-19

## 2024-04-22 RX ORDER — TROSPIUM CHLORIDE 20 MG/1
20 TABLET, FILM COATED ORAL
COMMUNITY
Start: 2023-09-18

## 2024-04-22 RX ORDER — HYDROCHLOROTHIAZIDE 12.5 MG/1
12.5 CAPSULE ORAL DAILY
COMMUNITY

## 2024-04-22 RX ORDER — PANTOPRAZOLE SODIUM 20 MG/1
20 TABLET, DELAYED RELEASE ORAL
COMMUNITY
Start: 2023-03-23

## 2024-04-22 RX ORDER — ACETAMINOPHEN 500 MG/1
CAPSULE, LIQUID FILLED ORAL EVERY 6 HOURS
COMMUNITY
Start: 2019-06-25

## 2024-04-22 RX ORDER — IBUPROFEN 200 MG
TABLET ORAL
COMMUNITY

## 2024-04-22 RX ORDER — CHOLECALCIFEROL (VITAMIN D3) 25 MCG
1000 TABLET ORAL DAILY
COMMUNITY

## 2024-04-23 ENCOUNTER — HOSPITAL ENCOUNTER (OUTPATIENT)
Dept: RADIOLOGY | Facility: HOSPITAL | Age: 74
Discharge: HOME | End: 2024-04-23
Payer: MEDICARE

## 2024-04-23 ENCOUNTER — HOSPITAL ENCOUNTER (OUTPATIENT)
Dept: PAIN MEDICINE | Facility: CLINIC | Age: 74
Discharge: HOME | End: 2024-04-23
Payer: MEDICARE

## 2024-04-23 VITALS
TEMPERATURE: 96.8 F | HEART RATE: 71 BPM | DIASTOLIC BLOOD PRESSURE: 65 MMHG | RESPIRATION RATE: 20 BRPM | SYSTOLIC BLOOD PRESSURE: 135 MMHG | OXYGEN SATURATION: 95 %

## 2024-04-23 DIAGNOSIS — M46.1 SACROILIITIS (CMS-HCC): ICD-10-CM

## 2024-04-23 PROCEDURE — 2500000004 HC RX 250 GENERAL PHARMACY W/ HCPCS (ALT 636 FOR OP/ED): Performed by: PAIN MEDICINE

## 2024-04-23 PROCEDURE — 27096 INJECT SACROILIAC JOINT: CPT | Performed by: PAIN MEDICINE

## 2024-04-23 PROCEDURE — 2550000001 HC RX 255 CONTRASTS: Performed by: PAIN MEDICINE

## 2024-04-23 PROCEDURE — 96372 THER/PROPH/DIAG INJ SC/IM: CPT | Performed by: PAIN MEDICINE

## 2024-04-23 PROCEDURE — 2500000005 HC RX 250 GENERAL PHARMACY W/O HCPCS: Performed by: PAIN MEDICINE

## 2024-04-23 PROCEDURE — 7100000010 HC PHASE TWO TIME - EACH INCREMENTAL 1 MINUTE: Performed by: PAIN MEDICINE

## 2024-04-23 PROCEDURE — 7100000009 HC PHASE TWO TIME - INITIAL BASE CHARGE: Performed by: PAIN MEDICINE

## 2024-04-23 RX ORDER — LIDOCAINE IN NACL,ISO-OSMOT/PF 30 MG/3 ML
10 SYRINGE (ML) INJECTION ONCE
Status: DISCONTINUED | OUTPATIENT
Start: 2024-04-23 | End: 2024-04-24 | Stop reason: HOSPADM

## 2024-04-23 RX ORDER — METHYLPREDNISOLONE ACETATE 40 MG/ML
80 INJECTION, SUSPENSION INTRA-ARTICULAR; INTRALESIONAL; INTRAMUSCULAR; SOFT TISSUE ONCE
Status: COMPLETED | OUTPATIENT
Start: 2024-04-23 | End: 2024-04-23

## 2024-04-23 RX ORDER — BUPIVACAINE HYDROCHLORIDE 5 MG/ML
10 INJECTION, SOLUTION PERINEURAL ONCE
Status: COMPLETED | OUTPATIENT
Start: 2024-04-23 | End: 2024-04-23

## 2024-04-23 RX ADMIN — BUPIVACAINE HYDROCHLORIDE 50 MG: 5 INJECTION, SOLUTION INFILTRATION; PERINEURAL at 13:09

## 2024-04-23 RX ADMIN — IOHEXOL 10 ML: 300 INJECTION, SOLUTION INTRAVENOUS at 13:10

## 2024-04-23 RX ADMIN — METHYLPREDNISOLONE ACETATE 80 MG: 40 INJECTION, SUSPENSION INTRA-ARTICULAR; INTRALESIONAL; INTRAMUSCULAR; INTRASYNOVIAL; SOFT TISSUE at 13:09

## 2024-04-23 ASSESSMENT — PAIN SCALES - GENERAL
PAINLEVEL_OUTOF10: 7
PAINLEVEL_OUTOF10: 1

## 2024-04-23 ASSESSMENT — PAIN DESCRIPTION - DESCRIPTORS: DESCRIPTORS: SHOOTING;SHARP

## 2024-04-23 ASSESSMENT — PAIN - FUNCTIONAL ASSESSMENT
PAIN_FUNCTIONAL_ASSESSMENT: 0-10
PAIN_FUNCTIONAL_ASSESSMENT: 0-10

## 2024-04-23 NOTE — DISCHARGE INSTRUCTIONS
Sacroiliac Joint Pain    About this topic  The spine ends in a set of 5 fused bones. They are called the sacrum. They join the pelvis at the sacroiliac joint. This is also called the SI joint. Strong bands of tissue called ligaments hold this joint together. Normally, there is very little movement at this joint. Its job is to absorb shock and take stress off of the spine. You can have SI joint pain if this joint is irritated.  What are the causes?  Sometimes, the exact cause of SI pain is unknown. It is not always known if the pain is in the joint or in the ligaments around the joint. The pain may be caused by wear and tear on the joint from arthritis. Pregnancy causes this joint to become looser. Sometimes, the pain may be caused by swelling from problems like gout or an injury. Infection or a fracture can also cause SI pain.  What can make this more likely to happen?  You are more likely to have this problem if you have had an injury to your spine, pelvis, or buttocks. Someone with a history of being pregnant a few times is more likely to have problems with her SI joint. Legs that are not the same length can cause pain as well. Some infections may cause problems with the SI joint.  What are the main signs?  Pain in the lower back or buttocks. It may also be felt in the hips or pelvis. Some people feel the pain in the groin or back of the legs. This pain is often worse with activity like climbing stairs or standing for a long time.  Numbness or tingling in the upper leg  Feeling of weakness in the leg  Stiffness  Feeling unstable when moving  How does the doctor diagnose this health problem?  The doctor will do an exam and feel around your lower back. Your doctor will have you bend and twist at the waist to see what makes the pain worse. Your doctor may have you move and push and pull on your legs to test your motion and strength. Your doctor will check if the muscles in the back or legs are tight. Your doctor may  check the feeling and reflexes in your legs.  The doctor may order:  X-ray  CT or MRI scan  Bone scan  Lab tests  Diagnostic injection ? injecting a drug into the joint to see if relief is given  How does the doctor treat this health problem?  Rest from activities that make the problem worse  Ice  Heat may be used later but not right away. Heat can make swelling worse.  Special belt worn low on the hips called an SI belt. It helps to hold the joint tightly together.  Electrical stimulation  Exercises  Chiropractic manipulation  Radiofrequency ablation ? A treatment where small nerves around the joint are burned so they are numb. This does not always work. It may only last for a few years.  Surgery may be needed if other treatment plans do not work.  Injections (cortisone)  Are there other health problems to treat?  If the problem is caused by an infection, inflammatory arthritis, or ankylosing spondylosis, these problems will need to be treated.  What drugs may be needed?  The doctor may order drugs to:  Help with pain and swelling  Fight an infection  The doctor may give you a shot to help with pain and swelling. Talk with your doctor about possible risks with this shot.  What problems could happen?  Long-term back pain  Weight gain, less muscle strength and flexibility, weaker bones  Arthritis  Need for surgery  Infection  What can be done to prevent this health problem?  Stay active and work out to keep your muscles strong and flexible. Warm up slowly and stretch before you exercise.  Use good posture.  Use proper ways to lift and bend:  Spread your feet apart so you have a good base of support. Then, bend with your knees when you  something from the ground.  When lifting and moving an object, keep your back straight. Keep the object as close to your body as possible. Do not twist. Instead, move your feet to the direction you are going.  Follow your doctor's orders about weight lifting.      Your pain may  not be gone immediately after the procedure--it usually takes the steroid 3-5 days to start working.   It may take several weeks for the medicine to reach its' full effect.   Pay attention to how much pain relief (what percentage compared to before the procedure) you get and for how long it lasts.     Activity: Avoid strenuous activity for 24 hours. After that return to your normal activity level.     Bandages: Remove after 24 hours     Showering/Bathing: You may shower after bandage is removed   Follow up: CALL OFFICE IN 7 DAYS [phone] LEAVE MESSAGE ABOUT THE RELIEF THAT WAS OBTAINED

## 2024-04-23 NOTE — OP NOTE
Preop Diagnosis  Sacroiliitis    Postop Diagnosis  Sacroiliitis    Operation  SI injection on the left side.    Surgical Indications  The patient is here today to receive a sacroiliac joint injection to assist with pain.    Operative Report  The patient was taken to the procedure room, and was placed into a prone positioning. The above-mentioned area was prepped and draped sterilely in the normal fashion. The  SI joint was identified. The skin and subcutaneous tissue was anesthetized with 1% lidocaine. Then, a 22-gauge spinal needle was introduced into the left SI joint under fluoroscopy, confirmed in multiple views with negative aspiration of heme and CSF, and with injection of contrast material. Then, the patient received 40 mg of Depo-Medrol diluted in 3 mL of 0.5% bupivacaine. The patient tolerated the procedure well, was taken to the recovery room, allowed to recover for sufficient amount of time, and then was discharged home in stable condition the patient was advised to followup with the [facility] on an as-needed basis.        Thank you for allowing me to participate in the care of this patient.    Aurora Harding MD  Medical director of [facility]   1:12 PM  04/23/24

## 2024-04-29 ENCOUNTER — TELEPHONE (OUTPATIENT)
Dept: PAIN MEDICINE | Facility: CLINIC | Age: 74
End: 2024-04-29
Payer: MEDICARE

## 2024-04-29 NOTE — TELEPHONE ENCOUNTER
Patient call stating that SI injection didn't work she had No relief. Patient would like to know what is the next step.

## 2024-04-30 ENCOUNTER — OFFICE VISIT (OUTPATIENT)
Dept: PAIN MEDICINE | Facility: CLINIC | Age: 74
End: 2024-04-30
Payer: MEDICARE

## 2024-04-30 VITALS
HEART RATE: 59 BPM | DIASTOLIC BLOOD PRESSURE: 86 MMHG | OXYGEN SATURATION: 100 % | RESPIRATION RATE: 20 BRPM | SYSTOLIC BLOOD PRESSURE: 150 MMHG

## 2024-04-30 DIAGNOSIS — M47.816 LUMBAR SPONDYLOSIS: Primary | ICD-10-CM

## 2024-04-30 PROCEDURE — 99203 OFFICE O/P NEW LOW 30 MIN: CPT | Performed by: NURSE PRACTITIONER

## 2024-04-30 PROCEDURE — 3077F SYST BP >= 140 MM HG: CPT | Performed by: NURSE PRACTITIONER

## 2024-04-30 PROCEDURE — 1159F MED LIST DOCD IN RCRD: CPT | Performed by: NURSE PRACTITIONER

## 2024-04-30 PROCEDURE — 3079F DIAST BP 80-89 MM HG: CPT | Performed by: NURSE PRACTITIONER

## 2024-04-30 PROCEDURE — 1125F AMNT PAIN NOTED PAIN PRSNT: CPT | Performed by: NURSE PRACTITIONER

## 2024-04-30 PROCEDURE — 1036F TOBACCO NON-USER: CPT | Performed by: NURSE PRACTITIONER

## 2024-04-30 PROCEDURE — 99213 OFFICE O/P EST LOW 20 MIN: CPT | Mod: ZK | Performed by: NURSE PRACTITIONER

## 2024-04-30 ASSESSMENT — PAIN SCALES - GENERAL
PAINLEVEL: 9
PAINLEVEL_OUTOF10: 9

## 2024-04-30 ASSESSMENT — PAIN - FUNCTIONAL ASSESSMENT: PAIN_FUNCTIONAL_ASSESSMENT: 0-10

## 2024-04-30 NOTE — PROGRESS NOTES
Subjective   Collin Lazar is a 73 y.o. female who presents for evaluation of her LEFT BUTTOCK PAIN AND GOES TO HER LEFT FOOT. THE PT HAD AN INJECTION AND IT DID NOT WORK HERE TO DISCUSS OTHER OPTIONS.   Past Medical History:   Diagnosis Date    Arthritis     Hypertension     Personal history of other diseases of the circulatory system     History of hypertension    Personal history of other diseases of the musculoskeletal system and connective tissue     History of rheumatoid arthritis    Personal history of other malignant neoplasm of bronchus and lung     History of malignant neoplasm of lung    Systemic lupus erythematosus, unspecified (Multi)     Lupus     Past Surgical History:   Procedure Laterality Date    MR HEAD ANGIO WO IV CONTRAST  9/4/2020    MR HEAD ANGIO WO IV CONTRAST 9/4/2020 [facility]    OTHER SURGICAL HISTORY  01/16/2021    Lumpectomy    OTHER SURGICAL HISTORY  01/28/2022    Hysterectomy    US ASPIRATION INJECTION INTERMEDIATE JOINT  6/10/2021    US ASPIRATION INJECTION INTERMEDIATE JOINT 6/10/2021 [facility]       I have reviewed the nurses notes and am aware of family/social history.     Review of Systems    Objective   Physical Exam    ASSESSMENT:     PLAN:   Discussed treatment plan with patient.   Call or return to clinic prn if these symptoms worsen or fail to improve as anticipated.     Carrie Remy RN

## 2024-04-30 NOTE — H&P
History Of Present Illness  Collin Lazar is a 73 y.o. female presenting for follow up. She is known in this clinic because of low back pain.  She recently had left SI injection done on 4/23/2024, she confirms today that it did not help with her pain at all.  She reports that in the past she had a procedure at [facility] about 4 to 5 years ago, and it helped with her back pain for several years. She had very minimal to no pain after the procedure.  Her level of pain at this time is about 9/10 mostly in her left leg, describing the pain as tingling, stabbing , pins and needles type of discomfort.  She takes Motrin alternating with Tylenol, Lidocaine patch in the past but those medications stopped working.   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     Past Medical History  Past Medical History:   Diagnosis Date    Arthritis     Hypertension     Personal history of other diseases of the circulatory system     History of hypertension    Personal history of other diseases of the musculoskeletal system and connective tissue     History of rheumatoid arthritis    Personal history of other malignant neoplasm of bronchus and lung     History of malignant neoplasm of lung    Systemic lupus erythematosus, unspecified (Multi)     Lupus       Surgical History  Past Surgical History:   Procedure Laterality Date    MR HEAD ANGIO WO IV CONTRAST  9/4/2020    MR HEAD ANGIO WO IV CONTRAST 9/4/2020 [facility]    OTHER SURGICAL HISTORY  01/16/2021    Lumpectomy    OTHER SURGICAL HISTORY  01/28/2022    Hysterectomy    US ASPIRATION INJECTION INTERMEDIATE JOINT  6/10/2021    US ASPIRATION INJECTION INTERMEDIATE JOINT 6/10/2021 [facility]        Social History  She reports that she has quit smoking. Her smoking use included cigarettes. She has never used smokeless tobacco. She reports that she does not drink alcohol and does not use drugs.    Family History  No family history on file.      Allergies  Tramadol and Naproxen    Review of Systems    Review of systems x 10 is negative.   No recent injury or falls reported.   No recent change in medical condition reported.   No recent weakness reported.   Still able to control bowel and bladder function.  Denies any problem with constipation.   Denies fever, cough, shortness of breath recently.   No interval change with medication/health issues reported.  Denies opioids diversion and abuse. Denies overuse of pain medications.    Physical Exam   Awake,alert, no acute distress, appropriate.  Spine is of normal curvature.  Full ROM on all 4 extremities, sensation and motor intact, no vascular compromise.  No pedal edema, normal gait.  Skin warm, dry, intact, turgor is normal.  States of tingling, stabbing, pins and needles type of discomfort in her left leg.    Last Recorded Vitals  Blood pressure 150/86, pulse 59, resp. rate 20, SpO2 100%.  Takes Amlodipine, Coreg, hydrochlorothiazide as prescribed by other Doctor.    Relevant Results  Xray of the Lumbar spine done on 3/13/2024 showed  Moderate facet joint arthropathy from L3-4 to L5-S1. Mild L1-L2 and L2-3 spondylosis.     Assessment/Plan     Discussed about procedures done in the past at [facility] which was RFA , I have also given her written information about this procedure.  She will be scheduled for diagnostic facet block L3-4, L5-S1 bilateral under fluoroscopic guidance for control of pain, to determine if the facet block is the cause of her pain.  Oswestry score is 16, Oswestry level is moderate disability.  Continues to swim on regular basis.  Explained plan to this patient, and patient verbalized understanding and agreement with the plan. If there is questions or concerns, please feel free to contact me to clarify.    Chronic back pain associated with lumbago, lumbar spondylosis.         I spent 30 minutes in the professional and overall care of this patient.      Deb Brunson,  APRN-CNP

## 2024-05-01 LAB
ATRIAL RATE: 62 BPM
ATRIAL RATE: 74 BPM
ATRIAL RATE: 74 BPM
P AXIS: 59 DEGREES
P AXIS: 59 DEGREES
P AXIS: 64 DEGREES
P OFFSET: 178 MS
P OFFSET: 183 MS
P OFFSET: 183 MS
P ONSET: 119 MS
P ONSET: 131 MS
P ONSET: 131 MS
PR INTERVAL: 158 MS
PR INTERVAL: 158 MS
PR INTERVAL: 178 MS
Q ONSET: 208 MS
Q ONSET: 210 MS
Q ONSET: 210 MS
QRS COUNT: 10 BEATS
QRS COUNT: 12 BEATS
QRS COUNT: 12 BEATS
QRS DURATION: 108 MS
QRS DURATION: 94 MS
QRS DURATION: 94 MS
QT INTERVAL: 360 MS
QT INTERVAL: 360 MS
QT INTERVAL: 396 MS
QTC CALCULATION(BAZETT): 399 MS
QTC CALCULATION(BAZETT): 399 MS
QTC CALCULATION(BAZETT): 401 MS
QTC FREDERICIA: 386 MS
QTC FREDERICIA: 386 MS
QTC FREDERICIA: 400 MS
R AXIS: -36 DEGREES
R AXIS: -38 DEGREES
R AXIS: -38 DEGREES
T AXIS: 26 DEGREES
T AXIS: 72 DEGREES
T AXIS: 72 DEGREES
T OFFSET: 390 MS
T OFFSET: 390 MS
T OFFSET: 406 MS
VENTRICULAR RATE: 62 BPM
VENTRICULAR RATE: 74 BPM
VENTRICULAR RATE: 74 BPM

## 2024-05-11 ENCOUNTER — HOSPITAL ENCOUNTER (OUTPATIENT)
Dept: RADIOLOGY | Facility: HOSPITAL | Age: 74
Discharge: HOME | End: 2024-05-11
Payer: MEDICARE

## 2024-05-11 DIAGNOSIS — N83.201 UNSPECIFIED OVARIAN CYST, RIGHT SIDE: ICD-10-CM

## 2024-05-11 PROCEDURE — 72195 MRI PELVIS W/O DYE: CPT

## 2024-05-22 ENCOUNTER — HOSPITAL ENCOUNTER (OUTPATIENT)
Dept: PAIN MEDICINE | Facility: CLINIC | Age: 74
Discharge: HOME | End: 2024-05-22
Payer: MEDICARE

## 2024-05-22 ENCOUNTER — HOSPITAL ENCOUNTER (OUTPATIENT)
Dept: RADIOLOGY | Facility: HOSPITAL | Age: 74
Discharge: HOME | End: 2024-05-22
Payer: MEDICARE

## 2024-05-22 VITALS
DIASTOLIC BLOOD PRESSURE: 77 MMHG | TEMPERATURE: 95.8 F | OXYGEN SATURATION: 98 % | SYSTOLIC BLOOD PRESSURE: 163 MMHG | RESPIRATION RATE: 18 BRPM | HEART RATE: 65 BPM

## 2024-05-22 DIAGNOSIS — M47.816 LUMBAR SPONDYLOSIS: ICD-10-CM

## 2024-05-22 PROCEDURE — 64493 INJ PARAVERT F JNT L/S 1 LEV: CPT | Mod: 50 | Performed by: PAIN MEDICINE

## 2024-05-22 PROCEDURE — 2500000005 HC RX 250 GENERAL PHARMACY W/O HCPCS: Performed by: PAIN MEDICINE

## 2024-05-22 PROCEDURE — 64493 INJ PARAVERT F JNT L/S 1 LEV: CPT | Performed by: PAIN MEDICINE

## 2024-05-22 PROCEDURE — 7100000010 HC PHASE TWO TIME - EACH INCREMENTAL 1 MINUTE: Performed by: PAIN MEDICINE

## 2024-05-22 PROCEDURE — 7100000009 HC PHASE TWO TIME - INITIAL BASE CHARGE: Performed by: PAIN MEDICINE

## 2024-05-22 PROCEDURE — 64494 INJ PARAVERT F JNT L/S 2 LEV: CPT | Performed by: PAIN MEDICINE

## 2024-05-22 RX ORDER — LIDOCAINE HYDROCHLORIDE 10 MG/ML
10 INJECTION, SOLUTION EPIDURAL; INFILTRATION; INTRACAUDAL; PERINEURAL ONCE
Status: COMPLETED | OUTPATIENT
Start: 2024-05-22 | End: 2024-05-22

## 2024-05-22 RX ORDER — LIDOCAINE HYDROCHLORIDE 20 MG/ML
10 INJECTION, SOLUTION EPIDURAL; INFILTRATION; INTRACAUDAL; PERINEURAL ONCE
Status: COMPLETED | OUTPATIENT
Start: 2024-05-22 | End: 2024-05-22

## 2024-05-22 RX ADMIN — LIDOCAINE HYDROCHLORIDE 200 MG: 20 INJECTION, SOLUTION EPIDURAL; INFILTRATION; INTRACAUDAL at 13:55

## 2024-05-22 RX ADMIN — LIDOCAINE HYDROCHLORIDE 100 MG: 10 INJECTION, SOLUTION EPIDURAL; INFILTRATION; INTRACAUDAL; PERINEURAL at 13:55

## 2024-05-22 ASSESSMENT — PAIN SCALES - GENERAL: PAINLEVEL_OUTOF10: 9

## 2024-05-22 ASSESSMENT — PAIN - FUNCTIONAL ASSESSMENT: PAIN_FUNCTIONAL_ASSESSMENT: 0-10

## 2024-05-22 NOTE — OP NOTE
Clinical Note  The patient is here today to receive diagnostic medial nerve branch block bilateral L3-L4 L4-L5 to assist with pain.    Procedure Note  The patient was taken to the procedure room, was placed into a prone position. The area was prepped and draped sterilely in the normal fashion. The patient was throughout the procedure monitored by the nursing staff. The skin and subcutaneous tissue was anesthetized with 1% lidocaine. Then 22-gauge spinal needles were introduced into the approximation of the medial nerve branches at the above mentioned level, confirmed in AP and oblique view with negative aspiration of heme and CSF. The patient received 0.5 mL of 2% lidocaine per site. The patient tolerated the procedure well, was taken to the recovery room, allowed to recover for sufficient amount of time and then was discharged home in stable condition. The patient was advised to followup with the [facility] to reassess the improvement and determine the need for the radiofrequency ablation.    Thank you for allowing me to participate in the care of this patient.

## 2024-05-22 NOTE — DISCHARGE INSTRUCTIONS
Post-injection instructions FOR FACET BLOCK      Pay attention to how much pain relief (what percentage compared to before the procedure) you get and for how long it lasts.     THIS IS A TEMPORARY NUMBING OF PAIN THIS IS BEING USED AS A DIAGNOSTIC INDICATOR IF THIS IS THE SOURCE OF YOUR PAIN    Activity:  RETURN TO NORMAL ACTIVITY  SEE IF YOU CAN APPRECIATE AN IMPROVEMENT IN THE PAIN    Bandages: Remove after 24 hours     Showering/Bathing: You may shower after bandage is removed     Follow up: CALL OFFICE NEXT BUSINESS -[phone] LEAVE MESSAGE ABOUT THE % OF RELIEF THAT WAS OBTAINED AND FOR HOW MANY HOURS      Call the OFFICE immediately: if you notice:     Excessive bleeding from procedure site (brisk bright red bleeding from the site or bleeding that soaks the bandages or does not stop)   Severe headache  Inability to walk, leg or arm weakness or numbness that is worse after the procedure   Uncontrolled pain   New urinary or fecal incontinence   Signs of infection: Fever above 101.5F, redness, swelling, pus or drainage from the site

## 2024-05-23 ENCOUNTER — OFFICE VISIT (OUTPATIENT)
Dept: OBGYN CLINIC | Age: 74
End: 2024-05-23
Payer: MEDICARE

## 2024-05-23 ENCOUNTER — TELEPHONE (OUTPATIENT)
Dept: PAIN MEDICINE | Facility: CLINIC | Age: 74
End: 2024-05-23
Payer: MEDICARE

## 2024-05-23 VITALS
HEART RATE: 72 BPM | BODY MASS INDEX: 28.41 KG/M2 | DIASTOLIC BLOOD PRESSURE: 78 MMHG | SYSTOLIC BLOOD PRESSURE: 138 MMHG | WEIGHT: 181 LBS | HEIGHT: 67 IN

## 2024-05-23 DIAGNOSIS — N83.9 LESION OF OVARY: ICD-10-CM

## 2024-05-23 DIAGNOSIS — N83.9 LESION OF OVARY: Primary | ICD-10-CM

## 2024-05-23 PROCEDURE — 1123F ACP DISCUSS/DSCN MKR DOCD: CPT | Performed by: OBSTETRICS & GYNECOLOGY

## 2024-05-23 PROCEDURE — 3078F DIAST BP <80 MM HG: CPT | Performed by: OBSTETRICS & GYNECOLOGY

## 2024-05-23 PROCEDURE — 99203 OFFICE O/P NEW LOW 30 MIN: CPT | Performed by: OBSTETRICS & GYNECOLOGY

## 2024-05-23 PROCEDURE — 3075F SYST BP GE 130 - 139MM HG: CPT | Performed by: OBSTETRICS & GYNECOLOGY

## 2024-05-23 SDOH — ECONOMIC STABILITY: FOOD INSECURITY: WITHIN THE PAST 12 MONTHS, THE FOOD YOU BOUGHT JUST DIDN'T LAST AND YOU DIDN'T HAVE MONEY TO GET MORE.: NEVER TRUE

## 2024-05-23 SDOH — ECONOMIC STABILITY: HOUSING INSECURITY
IN THE LAST 12 MONTHS, WAS THERE A TIME WHEN YOU DID NOT HAVE A STEADY PLACE TO SLEEP OR SLEPT IN A SHELTER (INCLUDING NOW)?: NO

## 2024-05-23 SDOH — ECONOMIC STABILITY: INCOME INSECURITY: HOW HARD IS IT FOR YOU TO PAY FOR THE VERY BASICS LIKE FOOD, HOUSING, MEDICAL CARE, AND HEATING?: NOT HARD AT ALL

## 2024-05-23 SDOH — ECONOMIC STABILITY: FOOD INSECURITY: WITHIN THE PAST 12 MONTHS, YOU WORRIED THAT YOUR FOOD WOULD RUN OUT BEFORE YOU GOT MONEY TO BUY MORE.: NEVER TRUE

## 2024-05-23 ASSESSMENT — PATIENT HEALTH QUESTIONNAIRE - PHQ9
1. LITTLE INTEREST OR PLEASURE IN DOING THINGS: NOT AT ALL
2. FEELING DOWN, DEPRESSED OR HOPELESS: NOT AT ALL
SUM OF ALL RESPONSES TO PHQ QUESTIONS 1-9: 0
SUM OF ALL RESPONSES TO PHQ9 QUESTIONS 1 & 2: 0
SUM OF ALL RESPONSES TO PHQ QUESTIONS 1-9: 0

## 2024-05-23 NOTE — TELEPHONE ENCOUNTER
Pt reports no relief from  lumbar facet short. Forwarding to office to call to [facility].

## 2024-05-24 LAB — CA 125: 9 U/ML (ref 0–38)

## 2024-05-30 ENCOUNTER — TELEPHONE (OUTPATIENT)
Dept: PAIN MEDICINE | Facility: CLINIC | Age: 74
End: 2024-05-30
Payer: MEDICARE

## 2024-06-12 ENCOUNTER — OFFICE VISIT (OUTPATIENT)
Dept: UROLOGY | Facility: CLINIC | Age: 74
End: 2024-06-12
Payer: MEDICARE

## 2024-06-12 VITALS
HEART RATE: 76 BPM | RESPIRATION RATE: 16 BRPM | WEIGHT: 180.12 LBS | BODY MASS INDEX: 28.27 KG/M2 | SYSTOLIC BLOOD PRESSURE: 138 MMHG | HEIGHT: 67 IN | DIASTOLIC BLOOD PRESSURE: 89 MMHG

## 2024-06-12 DIAGNOSIS — N39.41 URGE INCONTINENCE OF URINE: Primary | ICD-10-CM

## 2024-06-12 DIAGNOSIS — N28.1 RENAL CYST: ICD-10-CM

## 2024-06-12 PROCEDURE — G2211 COMPLEX E/M VISIT ADD ON: HCPCS | Performed by: UROLOGY

## 2024-06-12 PROCEDURE — 99214 OFFICE O/P EST MOD 30 MIN: CPT | Performed by: UROLOGY

## 2024-06-12 PROCEDURE — 1125F AMNT PAIN NOTED PAIN PRSNT: CPT | Performed by: UROLOGY

## 2024-06-12 PROCEDURE — 3075F SYST BP GE 130 - 139MM HG: CPT | Performed by: UROLOGY

## 2024-06-12 PROCEDURE — 1159F MED LIST DOCD IN RCRD: CPT | Performed by: UROLOGY

## 2024-06-12 PROCEDURE — 1036F TOBACCO NON-USER: CPT | Performed by: UROLOGY

## 2024-06-12 PROCEDURE — 1160F RVW MEDS BY RX/DR IN RCRD: CPT | Performed by: UROLOGY

## 2024-06-12 PROCEDURE — 3079F DIAST BP 80-89 MM HG: CPT | Performed by: UROLOGY

## 2024-06-12 ASSESSMENT — PAIN SCALES - GENERAL: PAINLEVEL: 4

## 2024-06-12 NOTE — LETTER
"June 12, 2024     Michael Kemp DO  [address]  [facility] [address]    Patient: Collin Lazar   YOB: 1950   Date of Visit: 6/12/2024       Dear Dr. Michael Kemp DO:    Thank you for referring Collin Lazar to me for evaluation. Below are my notes for this consultation.  If you have questions, please do not hesitate to call me. I look forward to following your patient along with you.       Sincerely,     Juan Anguiano MD      CC: No Recipients  ______________________________________________________________________________________        Provider Impressions     73 year-old female self-referred for \"KIDNEY CYSTS\". She has a history of LUNG CANCER AND LUPUS. She is a retired nurse's aide. No family history of breast or prostate cancer. She has never smoked cigarettes.     01/16/21, patient arrives alone. She did have LUNG CANCER but had been a minimal smoker over 40 years ago. Her LUPUS is not being actively treated at this time. Urinalysis and urine culture were negative. Cytology was not performed. Renal ultrasound shows a 4.4 cm left SIMPLE RENAL CYST and a 2.6 cm right SIMPLE RENAL CYST. We will continue to watch and monitor. A discussion regarding etiology of cysts in relationship to tumors was conducted with the patient.     February 28, 2022, patient arrives alone. She states due to different insurance companies her medication for LUPUS is changed and she is starting to have symptoms. Dr. Michael Kemp is evaluating and following. Her RENAL CYSTS have slightly increased, on the right from 2.5 to 2.9 cm and on the left from 4.0 to 4.3 cm. We will continue to follow.     June 7, 2022, patient arrives alone. Renal ultrasound shows bilateral RENAL CYSTS are stable since February 2022. She will return in 1 year.     June 13, 2023, patient arrives with her daughter Joey, a surgical nurse. Renal ultrasound identifies bilateral SIMPLE CYSTS. Right side 3.2 cm and left side " 4.17. She is now complaining of URINARY INCONTINENCE during the nighttime only when she arises out of bed. She states that she has significant leakage at that point. She would like to proceed with a cystoscopy, flow studies and a discussion of treatment options. I suspect she will either benefit from a 6 PM alpha agent or an antimuscarinic.     June 19, 2023, cystoscopy, flow studies and a discussion of treatment options. Severe erythema in the lower third of the bladder, bladder base and trigone. No evidence of stones or tumors. Flow rate 6 cc/s, total volume 26 cc, PVR 28 cc. We will initiate Myrbetriq at the 50 mg dose at 6 PM. She will return in 3 months.     September 18, 2023, cystoscopy today shows improvement in the bladder base erythema. No evidence of stones or tumors. Flow rate 5 cc/s, total volume 16 cc, PVR 0 cc. She discontinued Myrbetriq after 4 days due to headaches. She continues with nighttime incontinence which she claims is quite significant. I will therefore try Sanctura 20 mg nightly and manipulate secondary to her symptoms. If she has side effects again, then I will refer her to Dr. Nestor Dias for further evaluation and treatment she will return in 3 months.    January 7, 2024, nurse visit, flow rate 27 cc/s PVR 0 cc.  States Sanctura 20 mg nightly is working well.    April 1, 2024, admitted to [facility] for chest pain    June 12, 2024, patient arrives alone.  She is discontinued Sanctura and Myrbetriq.  She finds that water aerobics have decreased her incontinence and does not wish to take medication.  Also, she has had an MRI of the pelvis and a CAT scan of the abdomen.  These only show simple cysts in the kidneys bilaterally.  She will return in 1 year.     PLAN:     #1 the patient will return in June 2025 with a  renal ultrasound.      This note was created with voice-recognition software and was not corrected for typographical or grammatical errors

## 2024-06-12 NOTE — PATIENT INSTRUCTIONS
Patient Discussion/Summary     It was very nice to see you again today. We discussed your history of lupus and lung cancer. We discussed your findings of kidney cysts on your kidney CAT scan does show simple cysts in each kidney.  You are no longer experiencing urinary leakage since beginning water aerobics and you have discontinued your Myrbetriq and Sanctura.  Therefore we will see you again in 1 year with an ultrasound of the kidneys to continue to monitor your cysts.    This note was created with voice-recognition software and was not corrected for typographical or grammatical errors.

## 2024-06-12 NOTE — PROGRESS NOTES
"    Provider Impressions     73 year-old female self-referred for \"KIDNEY CYSTS\". She has a history of LUNG CANCER AND LUPUS. She is a retired nurse's aide. No family history of breast or prostate cancer. She has never smoked cigarettes.     01/16/21, patient arrives alone. She did have LUNG CANCER but had been a minimal smoker over 40 years ago. Her LUPUS is not being actively treated at this time. Urinalysis and urine culture were negative. Cytology was not performed. Renal ultrasound shows a 4.4 cm left SIMPLE RENAL CYST and a 2.6 cm right SIMPLE RENAL CYST. We will continue to watch and monitor. A discussion regarding etiology of cysts in relationship to tumors was conducted with the patient.     February 28, 2022, patient arrives alone. She states due to different insurance companies her medication for LUPUS is changed and she is starting to have symptoms. Dr. Michael Kemp is evaluating and following. Her RENAL CYSTS have slightly increased, on the right from 2.5 to 2.9 cm and on the left from 4.0 to 4.3 cm. We will continue to follow.     June 7, 2022, patient arrives alone. Renal ultrasound shows bilateral RENAL CYSTS are stable since February 2022. She will return in 1 year.     June 13, 2023, patient arrives with her daughter Joey, a surgical nurse. Renal ultrasound identifies bilateral SIMPLE CYSTS. Right side 3.2 cm and left side 4.17. She is now complaining of URINARY INCONTINENCE during the nighttime only when she arises out of bed. She states that she has significant leakage at that point. She would like to proceed with a cystoscopy, flow studies and a discussion of treatment options. I suspect she will either benefit from a 6 PM alpha agent or an antimuscarinic.     June 19, 2023, cystoscopy, flow studies and a discussion of treatment options. Severe erythema in the lower third of the bladder, bladder base and trigone. No evidence of stones or tumors. Flow rate 6 cc/s, total volume 26 cc, PVR 28 " cc. We will initiate Myrbetriq at the 50 mg dose at 6 PM. She will return in 3 months.     September 18, 2023, cystoscopy today shows improvement in the bladder base erythema. No evidence of stones or tumors. Flow rate 5 cc/s, total volume 16 cc, PVR 0 cc. She discontinued Myrbetriq after 4 days due to headaches. She continues with nighttime incontinence which she claims is quite significant. I will therefore try Sanctura 20 mg nightly and manipulate secondary to her symptoms. If she has side effects again, then I will refer her to Dr. Nestor Dias for further evaluation and treatment she will return in 3 months.    January 7, 2024, nurse visit, flow rate 27 cc/s PVR 0 cc.  States Sanctura 20 mg nightly is working well.    April 1, 2024, admitted to [facility] for chest pain    June 12, 2024, patient arrives alone.  She is discontinued Sanctura and Myrbetriq.  She finds that water aerobics have decreased her incontinence and does not wish to take medication.  Also, she has had an MRI of the pelvis and a CAT scan of the abdomen.  These only show simple cysts in the kidneys bilaterally.  She will return in 1 year.     PLAN:     #1 the patient will return in June 2025 with a  renal ultrasound.      This note was created with voice-recognition software and was not corrected for typographical or grammatical errors

## 2024-06-12 NOTE — PROGRESS NOTES
Patient denies any pain today. Patient was admitted at [facility] from 4/1/24 to 4/2/24 for chest pain. Patient denies any concerns about falling or safety. Patient has concerns for urgency. CV     Review of Systems   Genitourinary:  Positive for urgency.   All other systems reviewed and are negative.

## 2024-06-13 ENCOUNTER — APPOINTMENT (OUTPATIENT)
Dept: CARDIOLOGY | Facility: CLINIC | Age: 74
End: 2024-06-13
Payer: MEDICARE

## 2024-06-13 VITALS
SYSTOLIC BLOOD PRESSURE: 132 MMHG | WEIGHT: 180 LBS | HEIGHT: 67 IN | OXYGEN SATURATION: 96 % | HEART RATE: 85 BPM | DIASTOLIC BLOOD PRESSURE: 74 MMHG | BODY MASS INDEX: 28.25 KG/M2

## 2024-06-13 DIAGNOSIS — R07.9 CHEST PAIN, UNSPECIFIED TYPE: ICD-10-CM

## 2024-06-13 PROCEDURE — 99213 OFFICE O/P EST LOW 20 MIN: CPT | Performed by: INTERNAL MEDICINE

## 2024-06-13 PROCEDURE — 3075F SYST BP GE 130 - 139MM HG: CPT | Performed by: INTERNAL MEDICINE

## 2024-06-13 PROCEDURE — 3078F DIAST BP <80 MM HG: CPT | Performed by: INTERNAL MEDICINE

## 2024-06-13 PROCEDURE — 1036F TOBACCO NON-USER: CPT | Performed by: INTERNAL MEDICINE

## 2024-06-13 PROCEDURE — 1159F MED LIST DOCD IN RCRD: CPT | Performed by: INTERNAL MEDICINE

## 2024-06-13 PROCEDURE — 93000 ELECTROCARDIOGRAM COMPLETE: CPT | Performed by: INTERNAL MEDICINE

## 2024-06-13 NOTE — PROGRESS NOTES
Name : Collin Lazar   : 1950   MRN : [id number]   ENC Date : 2024      Assessment and Plan:  Hypertension: Blood pressure is well-controlled.  Recommend no changes.  Dyslipidemia: Continue statin therapy.  Disp: RTO in 1 year    HPI:  Patient returns to see me after hospitalization in April.  I am fairly certain I saw her in the hospital at that time but I cannot find a note.  I ordered the stress test that was done but for some reason I cannot find any note from that visit from myself.  Regardless the stress test showed no evidence of ischemia and normal LV function.  We adjusted her blood pressure medications and she is tolerating it quite well.  No lightheadedness nor dizziness.  No recurrent chest pain.    Problem list overview:   Patient Active Problem List   Diagnosis    Urge incontinence of urine    Primary hypertension    Brachial neuritis    Cervical spondylosis without myelopathy    Contusion of right shoulder    Cough, unspecified    Contusion of knee, left    DDD (degenerative disc disease), lumbar    Epistaxis    Fibromyalgia    Gastro-esophageal reflux disease without esophagitis    History of lung cancer    HLD (hyperlipidemia)    Low back pain    Lumbar strain    Noninflammatory disorder of the female genital organs    Parotid cyst    History of hypertension    Renal cyst    Lupus erythematosus    Knee sprain    Shortness of breath    Spondylosis of lumbar spine    Trochanteric bursitis of left hip       Meds:   Current Outpatient Medications on File Prior to Visit   Medication Sig Dispense Refill    acetaminophen (Tylenol) 500 mg capsule Take by mouth every 6 hours.      acyclovir (Zovirax) 5 % ointment apply to affected area as directed five times a day      adalimumab (Humira Pen) 40 mg/0.8 mL pen injector kit pen-injector Inject under the skin.      albuterol 90 mcg/actuation inhaler Inhale 2 puffs 4 times a day as needed.      amLODIPine (Norvasc) 2.5 mg tablet Take 1 tablet  (2.5 mg) by mouth once daily.      atorvastatin (Lipitor) 40 mg tablet Take 1 tablet (40 mg) by mouth once daily.      carvedilol (Coreg) 6.25 mg tablet Take 1 tablet (6.25 mg) by mouth 2 times a day with meals. 60 tablet 0    cefuroxime (Ceftin) 500 mg tablet Take 1 tablet (500 mg) by mouth 2 times a day.      cholecalciferol (Vitamin D-3) 25 MCG (1000 UT) tablet Take 1 tablet (1,000 Units) by mouth once daily.      clindamycin (Cleocin T) 1 % gel Apply topically twice a day.      diazePAM (Valium) 10 mg tablet TAKE 1 TABLET 1 HOUR PRIOR TO PROCEDURE      diclofenac (Cataflam) 50 mg tablet Take 1 tablet (50 mg) by mouth every 12 hours.      doxycycline (Vibramycin) 100 mg capsule TAKE 1 CAPSULE BY MOUTH TWICE A DAY WITH FOOD FOR 7 DAYS      enoxaparin (Lovenox) 40 mg/0.4 mL syringe Inject 0.4 mL (40 mg) under the skin.      etanercept (Enbrel) 50 mg/mL (1 mL) injection Inject under the skin.      fluticasone (Flonase Allergy Relief) 50 mcg/actuation nasal spray Administer into affected nostril(s).      gabapentin (Neurontin) 100 mg capsule Take by mouth.      hydroCHLOROthiazide (Microzide) 12.5 mg capsule Take 1 capsule (12.5 mg) by mouth once daily.      hydroxychloroquine (Plaquenil) 200 mg tablet Take 2 tablets (400 mg) by mouth once daily.      ibuprofen (Motrin IB) 200 mg tablet       latanoprost (Xalatan) 0.005 % ophthalmic solution Administer 1 drop into both eyes once daily at bedtime.      leflunomide (Arava) 10 mg tablet Take 2 tablets (20 mg) by mouth once daily.      magnesium oxide (Mag-Ox) 400 mg tablet Take 1 tablet (400 mg) by mouth once daily.      mirabegron (Myrbetriq) 50 mg tablet extended release 24 hr 24 hr tablet Take 1 tablet (50 mg) by mouth.      nitrofurantoin (Macrodantin) 50 mg capsule 1 capsule (50 mg).      nystatin (Mycostatin) 100,000 unit/mL suspension SWISH AND SWALLOW 5ML BY MOUTH 3 TIMES A DAY      pantoprazole (ProtoNix) 20 mg EC tablet Take 1 tablet (20 mg) by mouth once  "daily.      pantoprazole (ProtoNix) 40 mg EC tablet Take by mouth.      Paxlovid 300 mg (150 mg x 2)-100 mg tablet therapy pack TAKE 3 TABLETS BY MOUTH TWICE DAILY AS DIRECTED ON PACKAGING FOR 5 DAYS      polyethylene glycol (Glycolax, Miralax) 17 gram packet Take 17 g by mouth once daily.      potassium chloride CR 20 mEq ER tablet Take 1 tablet (20 mEq) by mouth once daily. Do not crush or chew.      sucralfate (Carafate) 1 gram tablet TAKE 1 TABLET BY MOUTH FOUR TIMES DAILY FOR 14 DAYS      trospium (Sanctura) 20 mg tablet Take 1 tablet (20 mg) by mouth.      [DISCONTINUED] famotidine (Pepcid) 20 mg tablet Take 1 tablet (20 mg) by mouth 2 times a day.       No current facility-administered medications on file prior to visit.        VS:  /74   Pulse 85   Ht 1.702 m (5' 7\")   Wt 81.6 kg (180 lb)   SpO2 96%   BMI 28.19 kg/m²     Vitals reviewed.   Neck:      Vascular: No JVD.   Pulmonary:      Effort: Pulmonary effort is normal.      Breath sounds: Normal breath sounds.   Cardiovascular:      Normal rate. Regular rhythm.      Murmurs: There is no murmur.      S4 Gallop.    Pulses:     Intact distal pulses.   Edema:     Peripheral edema absent.   Abdominal:      General: Abdomen is flat.      Palpations: Abdomen is soft.   Neurological:      General: No focal deficit present.      Mental Status: Alert.   Psychiatric:         Mood and Affect: Mood normal.         ECG: Normal sinus rhythm.  Left axis deviation.  Probable LVH.    Edgar Albert MD  "

## 2024-08-07 ENCOUNTER — OFFICE VISIT (OUTPATIENT)
Dept: PAIN MANAGEMENT | Age: 74
End: 2024-08-07
Payer: MEDICARE

## 2024-08-07 VITALS
WEIGHT: 175 LBS | BODY MASS INDEX: 27.47 KG/M2 | HEIGHT: 67 IN | TEMPERATURE: 97 F | DIASTOLIC BLOOD PRESSURE: 70 MMHG | SYSTOLIC BLOOD PRESSURE: 130 MMHG

## 2024-08-07 DIAGNOSIS — I70.0 ATHEROSCLEROSIS OF ABDOMINAL AORTA (HCC): ICD-10-CM

## 2024-08-07 DIAGNOSIS — M79.605 LEFT LEG PAIN: ICD-10-CM

## 2024-08-07 DIAGNOSIS — M48.062 SPINAL STENOSIS OF LUMBAR REGION WITH NEUROGENIC CLAUDICATION: Primary | ICD-10-CM

## 2024-08-07 DIAGNOSIS — M54.16 LUMBAR RADICULOPATHY: ICD-10-CM

## 2024-08-07 PROCEDURE — 99214 OFFICE O/P EST MOD 30 MIN: CPT | Performed by: PHYSICAL MEDICINE & REHABILITATION

## 2024-08-07 PROCEDURE — 3075F SYST BP GE 130 - 139MM HG: CPT | Performed by: PHYSICAL MEDICINE & REHABILITATION

## 2024-08-07 PROCEDURE — 1123F ACP DISCUSS/DSCN MKR DOCD: CPT | Performed by: PHYSICAL MEDICINE & REHABILITATION

## 2024-08-07 PROCEDURE — 3078F DIAST BP <80 MM HG: CPT | Performed by: PHYSICAL MEDICINE & REHABILITATION

## 2024-08-07 ASSESSMENT — ENCOUNTER SYMPTOMS
BACK PAIN: 1
DIARRHEA: 0
CONSTIPATION: 0
SHORTNESS OF BREATH: 0
NAUSEA: 0

## 2024-08-07 NOTE — PATIENT INSTRUCTIONS
Follow up with primary care team/cardiovascular medicine for atherosclerosis of abdominal aorta seen on XR LS Spine 3/14/24.

## 2024-08-07 NOTE — PROGRESS NOTES
Rosemarie Grant  (1950)    8/7/2024    Subjective:     Rosemarie Grant is 74 y.o. female who complains today of:    Chief Complaint   Patient presents with    Follow-up    Leg Pain       Last seen 11/17/22, last seen by me 12/16/20: cervical epidural C7/T1 ILESI from 11/17/2020 still helping with over 90% pain relief for over 3 years. \"That has been working very good.\" Kidneys are fine, parotids are fine, and lungs are fine per patient report. She tried lumbar medial branch blocks with Dr Copeland which provided her with greater than 80% short term pain relief with a positive diagnostic response with both sets of lumbar medial branch blocks. No longer on lidocaine diclofenac gabapentin. Last MRI LS Spine 5/18/20 with up to severe lumbar spinal canal stenosis. Requires Valium for pre-procedure anxiety.  No other tests, therapy or updates from other physicians, no ER visits    Neck pain is currently a 0/10, no current pain. Resolved with cervical epidural done 2020. There are no other associated symptoms or contextual factors. She denies any new weakness, saddle anesthesia, bowel or bladder dysfunction, or falls.    Low back pain is currently a 6/10 on NRS pain scale.  It gets up to a 10/10.  Located in the left low back and down the left leg. MRI LS Spine 5/18/2020 with up to severe lumbar spinal canal stenosis. It has been a constant ache for over four years. Right leg is fine. There are no other associated symptoms or contextual factors. She denies any new weakness, saddle anesthesia, bowel or bladder dysfunction, or falls.       Allergies:  Naproxen, Toradol [ketorolac tromethamine], and Ultram [tramadol]    Past Medical History:   Diagnosis Date    Arthritis      Past Surgical History:   Procedure Laterality Date    AXILLARY SURGERY Right 12/21/2016    DR ANAM HILLS    LYMPH NODE BIOPSY Left     2010    LYMPH NODE DISSECTION Right 12/21/2016    EXCISION  OF RIGHT AXILLARY MASS performed by Anam HO

## 2024-08-08 ENCOUNTER — TELEPHONE (OUTPATIENT)
Dept: PAIN MANAGEMENT | Age: 74
End: 2024-08-08

## 2024-08-08 NOTE — TELEPHONE ENCOUNTER
LEFT L4-5 L5-S1 TFESI     NO AUTH REQUIRED    OK to schedule procedure approved as above.   Please note sides/levels approved and date range.   (If applicable, sides/levels approved may differ from those ordered)    TO BE SCHEDULED WITH DR RICE

## 2024-08-21 ENCOUNTER — TELEPHONE (OUTPATIENT)
Dept: PAIN MANAGEMENT | Age: 74
End: 2024-08-21

## 2024-08-21 DIAGNOSIS — F40.298 NEEDLE PHOBIA: Primary | ICD-10-CM

## 2024-08-21 NOTE — TELEPHONE ENCOUNTER
PT. IS REQUESTING SCRIPT FOR VALIUM TO BE TAKEN PRIOR TO HER UPCOMING APPT. FOR INJECTION ON 9/3/24  WILL DR. RICE AUTHORIZED SCRIPT FOR VALIUM FOR THIS PATIENT?  PHARMACY-[facility]

## 2024-08-22 RX ORDER — DIAZEPAM 5 MG
5 TABLET ORAL 2 TIMES DAILY PRN
Qty: 2 TABLET | Refills: 0 | Status: SHIPPED | OUTPATIENT
Start: 2024-08-22 | End: 2024-08-23

## 2024-08-22 NOTE — TELEPHONE ENCOUNTER
Office visit 8/7/24 reviewed. 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 Do not drive while on this medicine  -Keep procedure appointment on 9/3/24    Controlled Substance Monitoring:    Acute and Chronic Pain Monitoring:   RX Monitoring Periodic Controlled Substance Monitoring   8/22/2024   1:39 PM Obtaining appropriate analgesic effect of treatment.

## 2024-09-03 ENCOUNTER — PROCEDURE VISIT (OUTPATIENT)
Age: 74
End: 2024-09-03
Payer: MEDICARE

## 2024-09-03 VITALS
BODY MASS INDEX: 27.47 KG/M2 | OXYGEN SATURATION: 99 % | HEART RATE: 83 BPM | SYSTOLIC BLOOD PRESSURE: 122 MMHG | WEIGHT: 175 LBS | TEMPERATURE: 97.4 F | HEIGHT: 67 IN | DIASTOLIC BLOOD PRESSURE: 70 MMHG

## 2024-09-03 DIAGNOSIS — M54.16 LUMBAR RADICULOPATHY: Primary | ICD-10-CM

## 2024-09-03 PROCEDURE — 64483 NJX AA&/STRD TFRM EPI L/S 1: CPT | Performed by: PHYSICAL MEDICINE & REHABILITATION

## 2024-09-03 PROCEDURE — 64484 NJX AA&/STRD TFRM EPI L/S EA: CPT | Performed by: PHYSICAL MEDICINE & REHABILITATION

## 2024-09-03 RX ORDER — LIDOCAINE HYDROCHLORIDE 10 MG/ML
6 INJECTION, SOLUTION EPIDURAL; INFILTRATION; INTRACAUDAL; PERINEURAL ONCE
Status: COMPLETED | OUTPATIENT
Start: 2024-09-03 | End: 2024-09-03

## 2024-09-03 RX ORDER — SODIUM CHLORIDE 9 MG/ML
2 INJECTION, SOLUTION INTRAMUSCULAR; INTRAVENOUS; SUBCUTANEOUS ONCE
Status: COMPLETED | OUTPATIENT
Start: 2024-09-03 | End: 2024-09-03

## 2024-09-03 RX ORDER — DEXAMETHASONE SODIUM PHOSPHATE 10 MG/ML
10 INJECTION, SOLUTION INTRAMUSCULAR; INTRAVENOUS ONCE
Status: COMPLETED | OUTPATIENT
Start: 2024-09-03 | End: 2024-09-03

## 2024-09-03 RX ADMIN — LIDOCAINE HYDROCHLORIDE 6 ML: 10 INJECTION, SOLUTION EPIDURAL; INFILTRATION; INTRACAUDAL; PERINEURAL at 16:40

## 2024-09-03 RX ADMIN — SODIUM CHLORIDE 2 ML: 9 INJECTION, SOLUTION INTRAMUSCULAR; INTRAVENOUS; SUBCUTANEOUS at 16:40

## 2024-09-03 RX ADMIN — Medication 1 MEQ: at 16:40

## 2024-09-03 RX ADMIN — DEXAMETHASONE SODIUM PHOSPHATE 10 MG: 10 INJECTION, SOLUTION INTRAMUSCULAR; INTRAVENOUS at 16:40

## 2024-09-03 NOTE — PROGRESS NOTES
Lumbar Transforaminal Epidural Steroid Injection (TFESI)    Patient Name: Rosemarie Grant   : 1950  Date: 9/3/2024     Physician: Isreal David MD     INDICATIONS: Rosemarie Grant is a 74 y.o. female who presents with symptoms and physical exam findings consistent with lumbar radiculopathy. She has lumbosacral paresthesias with a positive straight leg raise on physical exam. She has had persistent pain that limits her activities of daily living. The pain is persistent despite conservative measures. She has significant functional and psychological impairment due to this condition. Given her symptoms, physical exam findings, impairment in activities of daily living, and lack of response to conservative measures, consideration for lumbar transforaminal epidural corticosteroid injection was given. Discussed the risks including but not limited to bleeding, infection, worsened pain, damage to surrounding structures, side effects, toxicity, allergic reactions to medications used, need for surgery, headache, vision changes, difficulty with chewing and/or swallowing, immune and stress-response dysfunction, fat necrosis, skin pigmentation changes, blood sugar elevation, as well as catastrophic injury such as vision loss/blindness, paralysis, spinal cord or plexus injury, cerebral brainstem or spinal cord infarction, intrathecal injection, spinal cord puncture, arachnoiditis, discitis, stroke, bowel/bladder/sexual dysfunction, ventilator dependence, loss of use of the arms and/or legs, and death. Discussed off-label use of corticosteroids and how the Food and Drug Administration (FDA) has not approved corticosteroids for epidural use. Discussed the risks, benefits, alternative procedures, and alternatives to the procedure including no procedure at all. Discussed that we cannot undo any permanent neurologic or orthopaedic damage or change the course of any underlying disease. After thorough discussion, patient

## 2024-09-05 ENCOUNTER — OFFICE VISIT (OUTPATIENT)
Age: 74
End: 2024-09-05
Payer: MEDICARE

## 2024-09-05 VITALS
DIASTOLIC BLOOD PRESSURE: 68 MMHG | SYSTOLIC BLOOD PRESSURE: 128 MMHG | TEMPERATURE: 97 F | BODY MASS INDEX: 27.12 KG/M2 | HEIGHT: 67 IN | WEIGHT: 172.8 LBS

## 2024-09-05 DIAGNOSIS — M46.1 SACROILIAC INFLAMMATION (HCC): Primary | ICD-10-CM

## 2024-09-05 PROCEDURE — 3078F DIAST BP <80 MM HG: CPT | Performed by: NURSE PRACTITIONER

## 2024-09-05 PROCEDURE — 3074F SYST BP LT 130 MM HG: CPT | Performed by: NURSE PRACTITIONER

## 2024-09-05 PROCEDURE — 1123F ACP DISCUSS/DSCN MKR DOCD: CPT | Performed by: NURSE PRACTITIONER

## 2024-09-05 PROCEDURE — 99215 OFFICE O/P EST HI 40 MIN: CPT

## 2024-09-05 PROCEDURE — 99214 OFFICE O/P EST MOD 30 MIN: CPT | Performed by: NURSE PRACTITIONER

## 2024-09-05 RX ORDER — DIAZEPAM 5 MG
5 TABLET ORAL SEE ADMIN INSTRUCTIONS
Qty: 2 TABLET | Refills: 0 | Status: SHIPPED | OUTPATIENT
Start: 2024-09-05 | End: 2024-09-06

## 2024-09-05 ASSESSMENT — ENCOUNTER SYMPTOMS: BACK PAIN: 1

## 2024-09-05 NOTE — PROGRESS NOTES
Rosemarie Grant  (1950)    2024    Subjective:     Rosemarie Grant is 74 y.o. female who complains today of:    Chief Complaint   Patient presents with    Follow-up    Leg Pain     Left          Allergies:  Naproxen, Toradol [ketorolac tromethamine], and Ultram [tramadol]    Past Medical History:   Diagnosis Date    Arthritis      Past Surgical History:   Procedure Laterality Date    AXILLARY SURGERY Right 2016    DR ANAM RODRIGUEZ    LYMPH NODE BIOPSY Left     2010    LYMPH NODE DISSECTION Right 2016    EXCISION  OF RIGHT AXILLARY MASS performed by Anam Rodriguez MD at [facility]     Family History   Problem Relation Age of Onset    Arthritis Sister     Asthma Maternal Cousin      Social History     Socioeconomic History    Marital status:      Spouse name: Not on file    Number of children: Not on file    Years of education: Not on file    Highest education level: Not on file   Occupational History    Not on file   Tobacco Use    Smoking status: Former     Current packs/day: 0.00     Average packs/day: 0.5 packs/day for 5.0 years (2.5 ttl pk-yrs)     Types: Cigarettes     Start date:      Quit date:      Years since quittin.7    Smokeless tobacco: Never   Vaping Use    Vaping status: Never Used   Substance and Sexual Activity    Alcohol use: Yes     Comment: Socially    Drug use: No    Sexual activity: Never   Other Topics Concern    Not on file   Social History Narrative    Not on file     Social Determinants of Health     Financial Resource Strain: Low Risk  (2024)    Overall Financial Resource Strain (CARDIA)     Difficulty of Paying Living Expenses: Not hard at all   Recent Concern: Financial Resource Strain - High Risk (2024)    [facility]    Overall Financial Resource Strain (CARDIA)     Difficulty of Paying Living Expenses: Very hard   Food Insecurity: No Food Insecurity

## 2024-09-07 ENCOUNTER — HOSPITAL ENCOUNTER (EMERGENCY)
Facility: HOSPITAL | Age: 74
Discharge: HOME | End: 2024-09-07
Attending: STUDENT IN AN ORGANIZED HEALTH CARE EDUCATION/TRAINING PROGRAM
Payer: MEDICARE

## 2024-09-07 ENCOUNTER — APPOINTMENT (OUTPATIENT)
Dept: RADIOLOGY | Facility: HOSPITAL | Age: 74
End: 2024-09-07
Payer: MEDICARE

## 2024-09-07 VITALS
TEMPERATURE: 97.2 F | SYSTOLIC BLOOD PRESSURE: 152 MMHG | HEIGHT: 67 IN | HEART RATE: 82 BPM | RESPIRATION RATE: 16 BRPM | OXYGEN SATURATION: 99 % | DIASTOLIC BLOOD PRESSURE: 80 MMHG | BODY MASS INDEX: 27 KG/M2 | WEIGHT: 172 LBS

## 2024-09-07 DIAGNOSIS — Z04.1 EXAM FOLLOWING MVC (MOTOR VEHICLE COLLISION), NO APPARENT INJURY: ICD-10-CM

## 2024-09-07 DIAGNOSIS — S16.1XXA CERVICAL STRAIN, ACUTE, INITIAL ENCOUNTER: Primary | ICD-10-CM

## 2024-09-07 PROCEDURE — 99284 EMERGENCY DEPT VISIT MOD MDM: CPT | Performed by: STUDENT IN AN ORGANIZED HEALTH CARE EDUCATION/TRAINING PROGRAM

## 2024-09-07 PROCEDURE — 99285 EMERGENCY DEPT VISIT HI MDM: CPT | Mod: 25

## 2024-09-07 PROCEDURE — 70450 CT HEAD/BRAIN W/O DYE: CPT | Performed by: STUDENT IN AN ORGANIZED HEALTH CARE EDUCATION/TRAINING PROGRAM

## 2024-09-07 PROCEDURE — 72125 CT NECK SPINE W/O DYE: CPT | Performed by: STUDENT IN AN ORGANIZED HEALTH CARE EDUCATION/TRAINING PROGRAM

## 2024-09-07 PROCEDURE — 72125 CT NECK SPINE W/O DYE: CPT

## 2024-09-07 PROCEDURE — 70450 CT HEAD/BRAIN W/O DYE: CPT

## 2024-09-07 RX ORDER — ACETAMINOPHEN 325 MG/1
650 TABLET ORAL ONCE
Status: COMPLETED | OUTPATIENT
Start: 2024-09-07 | End: 2024-09-07

## 2024-09-07 RX ADMIN — ACETAMINOPHEN 650 MG: 325 TABLET ORAL at 22:09

## 2024-09-07 ASSESSMENT — PAIN DESCRIPTION - DESCRIPTORS: DESCRIPTORS: THROBBING

## 2024-09-07 ASSESSMENT — PAIN - FUNCTIONAL ASSESSMENT: PAIN_FUNCTIONAL_ASSESSMENT: 0-10

## 2024-09-07 ASSESSMENT — PAIN DESCRIPTION - LOCATION: LOCATION: NECK

## 2024-09-07 ASSESSMENT — PAIN SCALES - GENERAL
PAINLEVEL_OUTOF10: 7
PAINLEVEL_OUTOF10: 4

## 2024-09-07 ASSESSMENT — LIFESTYLE VARIABLES
HAVE PEOPLE ANNOYED YOU BY CRITICIZING YOUR DRINKING: NO
EVER HAD A DRINK FIRST THING IN THE MORNING TO STEADY YOUR NERVES TO GET RID OF A HANGOVER: NO
EVER FELT BAD OR GUILTY ABOUT YOUR DRINKING: NO
HAVE YOU EVER FELT YOU SHOULD CUT DOWN ON YOUR DRINKING: NO
TOTAL SCORE: 0
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
## 2024-09-08 NOTE — DISCHARGE INSTRUCTIONS
Return to the ED if the pain worsens, develop any numbness and tingling in the extremities, if there are any visual changes, and if you have any concerns.  Follow-up with your primary care physician in 1 week.

## 2024-09-09 ENCOUNTER — PROCEDURE VISIT (OUTPATIENT)
Age: 74
End: 2024-09-09
Payer: MEDICARE

## 2024-09-09 DIAGNOSIS — R94.131 ABNORMAL ELECTROMYOGRAM (EMG): ICD-10-CM

## 2024-09-09 DIAGNOSIS — M48.062 SPINAL STENOSIS OF LUMBAR REGION WITH NEUROGENIC CLAUDICATION: ICD-10-CM

## 2024-09-09 DIAGNOSIS — M79.605 LEFT LEG PAIN: Primary | ICD-10-CM

## 2024-09-09 DIAGNOSIS — Z85.118 HISTORY OF LUNG CANCER: ICD-10-CM

## 2024-09-09 DIAGNOSIS — M54.16 LUMBAR RADICULOPATHY: ICD-10-CM

## 2024-09-09 PROCEDURE — 95911 NRV CNDJ TEST 9-10 STUDIES: CPT | Performed by: PHYSICAL MEDICINE & REHABILITATION

## 2024-09-09 PROCEDURE — 95886 MUSC TEST DONE W/N TEST COMP: CPT | Performed by: PHYSICAL MEDICINE & REHABILITATION

## 2024-09-09 PROCEDURE — 99214 OFFICE O/P EST MOD 30 MIN: CPT

## 2024-09-18 ENCOUNTER — TELEPHONE (OUTPATIENT)
Age: 74
End: 2024-09-18

## 2024-09-24 ENCOUNTER — PROCEDURE VISIT (OUTPATIENT)
Age: 74
End: 2024-09-24
Payer: MEDICARE

## 2024-09-24 VITALS
WEIGHT: 172 LBS | OXYGEN SATURATION: 96 % | SYSTOLIC BLOOD PRESSURE: 144 MMHG | HEIGHT: 67 IN | DIASTOLIC BLOOD PRESSURE: 68 MMHG | HEART RATE: 83 BPM | TEMPERATURE: 97.1 F | BODY MASS INDEX: 27 KG/M2

## 2024-09-24 DIAGNOSIS — M46.1 SACROILIITIS (HCC): ICD-10-CM

## 2024-09-24 DIAGNOSIS — M99.04 SOMATIC DYSFUNCTION OF SACROILIAC JOINT: Primary | ICD-10-CM

## 2024-09-24 PROCEDURE — G0260 INJ FOR SACROILIAC JT ANESTH: HCPCS | Performed by: PHYSICAL MEDICINE & REHABILITATION

## 2024-09-24 PROCEDURE — 27096 INJECT SACROILIAC JOINT: CPT | Performed by: PHYSICAL MEDICINE & REHABILITATION

## 2024-09-24 RX ORDER — LIDOCAINE HYDROCHLORIDE 10 MG/ML
5 INJECTION, SOLUTION EPIDURAL; INFILTRATION; INTRACAUDAL; PERINEURAL ONCE
Status: COMPLETED | OUTPATIENT
Start: 2024-09-24 | End: 2024-09-24

## 2024-09-24 RX ORDER — BETAMETHASONE SODIUM PHOSPHATE AND BETAMETHASONE ACETATE 3; 3 MG/ML; MG/ML
3 INJECTION, SUSPENSION INTRA-ARTICULAR; INTRALESIONAL; INTRAMUSCULAR; SOFT TISSUE ONCE
Status: COMPLETED | OUTPATIENT
Start: 2024-09-24 | End: 2024-09-24

## 2024-09-24 RX ADMIN — Medication 1 MEQ: at 15:56

## 2024-09-24 RX ADMIN — LIDOCAINE HYDROCHLORIDE 5 ML: 10 INJECTION, SOLUTION EPIDURAL; INFILTRATION; INTRACAUDAL; PERINEURAL at 15:56

## 2024-09-24 RX ADMIN — BETAMETHASONE SODIUM PHOSPHATE AND BETAMETHASONE ACETATE 3 MG: 3; 3 INJECTION, SUSPENSION INTRA-ARTICULAR; INTRALESIONAL; INTRAMUSCULAR at 15:55

## 2024-09-25 ENCOUNTER — OFFICE VISIT (OUTPATIENT)
Dept: CARDIOLOGY CLINIC | Age: 74
End: 2024-09-25
Payer: MEDICARE

## 2024-09-25 VITALS
OXYGEN SATURATION: 99 % | HEART RATE: 75 BPM | WEIGHT: 170 LBS | SYSTOLIC BLOOD PRESSURE: 116 MMHG | DIASTOLIC BLOOD PRESSURE: 68 MMHG | BODY MASS INDEX: 26.63 KG/M2

## 2024-09-25 DIAGNOSIS — R07.9 CHEST PAIN, UNSPECIFIED TYPE: ICD-10-CM

## 2024-09-25 DIAGNOSIS — I25.10 CORONARY ARTERY DISEASE DUE TO LIPID RICH PLAQUE: ICD-10-CM

## 2024-09-25 DIAGNOSIS — I10 ESSENTIAL (PRIMARY) HYPERTENSION: Primary | ICD-10-CM

## 2024-09-25 DIAGNOSIS — I73.9 CLAUDICATION (HCC): ICD-10-CM

## 2024-09-25 DIAGNOSIS — I25.83 CORONARY ARTERY DISEASE DUE TO LIPID RICH PLAQUE: ICD-10-CM

## 2024-09-25 DIAGNOSIS — F17.200 SMOKING: ICD-10-CM

## 2024-09-25 PROCEDURE — 3078F DIAST BP <80 MM HG: CPT | Performed by: INTERNAL MEDICINE

## 2024-09-25 PROCEDURE — 1123F ACP DISCUSS/DSCN MKR DOCD: CPT | Performed by: INTERNAL MEDICINE

## 2024-09-25 PROCEDURE — 99204 OFFICE O/P NEW MOD 45 MIN: CPT | Performed by: INTERNAL MEDICINE

## 2024-09-25 PROCEDURE — 3074F SYST BP LT 130 MM HG: CPT | Performed by: INTERNAL MEDICINE

## 2024-09-25 PROCEDURE — 93000 ELECTROCARDIOGRAM COMPLETE: CPT | Performed by: INTERNAL MEDICINE

## 2024-09-25 ASSESSMENT — ENCOUNTER SYMPTOMS
CHEST TIGHTNESS: 0
ABDOMINAL PAIN: 0
WHEEZING: 0
COLOR CHANGE: 0
RHINORRHEA: 0
CONSTIPATION: 0
DIARRHEA: 0
COUGH: 0
VOMITING: 0
APNEA: 0
SHORTNESS OF BREATH: 0
NAUSEA: 0
EYE REDNESS: 0

## 2024-10-03 ENCOUNTER — HOSPITAL ENCOUNTER (OUTPATIENT)
Dept: MRI IMAGING | Age: 74
Discharge: HOME | End: 2024-10-05
Payer: MEDICARE

## 2024-10-03 DIAGNOSIS — M79.605 LEFT LEG PAIN: ICD-10-CM

## 2024-10-03 DIAGNOSIS — Z85.118 HISTORY OF LUNG CANCER: ICD-10-CM

## 2024-10-03 DIAGNOSIS — R94.131 ABNORMAL ELECTROMYOGRAM (EMG): ICD-10-CM

## 2024-10-03 DIAGNOSIS — M48.062 SPINAL STENOSIS OF LUMBAR REGION WITH NEUROGENIC CLAUDICATION: ICD-10-CM

## 2024-10-03 DIAGNOSIS — M54.16 LUMBAR RADICULOPATHY: ICD-10-CM

## 2024-10-03 PROCEDURE — 72158 MRI LUMBAR SPINE W/O & W/DYE: CPT

## 2024-10-03 PROCEDURE — A9579 GAD-BASE MR CONTRAST NOS,1ML: HCPCS | Performed by: PHYSICAL MEDICINE & REHABILITATION

## 2024-10-03 PROCEDURE — 6360000004 HC RX CONTRAST MEDICATION: Performed by: PHYSICAL MEDICINE & REHABILITATION

## 2024-10-03 RX ADMIN — GADOTERIDOL 15 ML: 279.3 INJECTION, SOLUTION INTRAVENOUS at 10:21

## 2024-10-07 ENCOUNTER — TELEPHONE (OUTPATIENT)
Age: 74
End: 2024-10-07

## 2024-10-07 NOTE — TELEPHONE ENCOUNTER
----- Message from Dr. Isreal David MD sent at 10/4/2024  6:37 PM EDT -----  Results reviewed, okay to share result report with patient. MRI L-spine shows L4-5 anterolisthesis, no fracture, L3-4 moderate canal stenosis, L4-5 up to severe spinal canal stenosis, L5-S1 up to severe spinal canal stenosis and severe bilateral foraminal stenosis.  Recommend neurosurgery spine evaluation for multilevel severe lumbar spinal stenosis.  Avoid falls and accidents.  Call 911 or go to the emergency room for any worsening weakness, bowel or bladder dysfunction, severe pain, or any other concerning symptoms.  Please make follow up appointment to discuss in greater detail.

## 2024-10-07 NOTE — TELEPHONE ENCOUNTER
PATIENT INFORMED OF RESULTS. PATIENT MADE SOONER APPOINTMENT TO DISCUSS RESULTS WITH BRETT SCHRADER.

## 2024-10-08 ENCOUNTER — HOSPITAL ENCOUNTER (OUTPATIENT)
Dept: ULTRASOUND IMAGING | Age: 74
Discharge: HOME | End: 2024-10-10
Attending: INTERNAL MEDICINE
Payer: MEDICARE

## 2024-10-08 ENCOUNTER — OFFICE VISIT (OUTPATIENT)
Age: 74
End: 2024-10-08
Payer: MEDICARE

## 2024-10-08 VITALS
TEMPERATURE: 97.7 F | HEIGHT: 67 IN | WEIGHT: 170 LBS | BODY MASS INDEX: 26.68 KG/M2 | DIASTOLIC BLOOD PRESSURE: 80 MMHG | SYSTOLIC BLOOD PRESSURE: 134 MMHG

## 2024-10-08 DIAGNOSIS — M99.04 SOMATIC DYSFUNCTION OF SACROILIAC JOINT: ICD-10-CM

## 2024-10-08 DIAGNOSIS — F17.200 SMOKING: ICD-10-CM

## 2024-10-08 DIAGNOSIS — M46.1 SACROILIITIS (HCC): Primary | ICD-10-CM

## 2024-10-08 DIAGNOSIS — M54.16 LUMBAR RADICULOPATHY: ICD-10-CM

## 2024-10-08 DIAGNOSIS — M48.062 SPINAL STENOSIS OF LUMBAR REGION WITH NEUROGENIC CLAUDICATION: ICD-10-CM

## 2024-10-08 DIAGNOSIS — I73.9 CLAUDICATION (HCC): ICD-10-CM

## 2024-10-08 DIAGNOSIS — M47.817 LUMBOSACRAL SPONDYLOSIS WITHOUT MYELOPATHY: ICD-10-CM

## 2024-10-08 LAB — ECHO BSA: 1.91 M2

## 2024-10-08 PROCEDURE — 93924 LWR XTR VASC STDY BILAT: CPT

## 2024-10-08 PROCEDURE — 99214 OFFICE O/P EST MOD 30 MIN: CPT | Performed by: NURSE PRACTITIONER

## 2024-10-08 PROCEDURE — 3079F DIAST BP 80-89 MM HG: CPT | Performed by: NURSE PRACTITIONER

## 2024-10-08 PROCEDURE — 99213 OFFICE O/P EST LOW 20 MIN: CPT

## 2024-10-08 PROCEDURE — 3075F SYST BP GE 130 - 139MM HG: CPT | Performed by: NURSE PRACTITIONER

## 2024-10-08 PROCEDURE — 76706 US ABDL AORTA SCREEN AAA: CPT

## 2024-10-08 PROCEDURE — 1123F ACP DISCUSS/DSCN MKR DOCD: CPT | Performed by: NURSE PRACTITIONER

## 2024-10-08 PROCEDURE — 93924 LWR XTR VASC STDY BILAT: CPT | Performed by: INTERNAL MEDICINE

## 2024-10-08 RX ORDER — GABAPENTIN 300 MG/1
300 CAPSULE ORAL SEE ADMIN INSTRUCTIONS
Qty: 90 CAPSULE | Refills: 0 | Status: SHIPPED | OUTPATIENT
Start: 2024-10-08 | End: 2024-11-07

## 2024-10-08 ASSESSMENT — ENCOUNTER SYMPTOMS
SORE THROAT: 0
BACK PAIN: 1
ABDOMINAL PAIN: 0
SHORTNESS OF BREATH: 0

## 2024-10-08 NOTE — PROGRESS NOTES
Rosemarie Grant  (1950)    10/8/2024    Subjective:     Rosemarie Grant is 74 y.o. female who complains today of:    Chief Complaint   Patient presents with    Follow-up     Discuss mri results    Leg Pain     Left     Foot Pain    Hip Pain    Back Pain     Lower          Allergies:  Naproxen, Toradol [ketorolac tromethamine], and Ultram [tramadol]    Past Medical History:   Diagnosis Date    Arthritis      Past Surgical History:   Procedure Laterality Date    AXILLARY SURGERY Right 2016    DR ANAM RODRIGUEZ    LYMPH NODE BIOPSY Left     2010    LYMPH NODE DISSECTION Right 2016    EXCISION  OF RIGHT AXILLARY MASS performed by Anam Rodriguez MD at [facility] [place]     Family History   Problem Relation Age of Onset    Arthritis Sister     Asthma Maternal Cousin      Social History     Socioeconomic History    Marital status:      Spouse name: Not on file    Number of children: Not on file    Years of education: Not on file    Highest education level: Not on file   Occupational History    Not on file   Tobacco Use    Smoking status: Former     Current packs/day: 0.00     Average packs/day: 0.5 packs/day for 5.0 years (2.5 ttl pk-yrs)     Types: Cigarettes     Start date:      Quit date:      Years since quittin.7    Smokeless tobacco: Never   Vaping Use    Vaping status: Never Used   Substance and Sexual Activity    Alcohol use: Yes     Comment: Socially    Drug use: No    Sexual activity: Never   Other Topics Concern    Not on file   Social History Narrative    Not on file     Social Determinants of Health     Financial Resource Strain: Low Risk  (2024)    Overall Financial Resource Strain (CARDIA)     Difficulty of Paying Living Expenses: Not hard at all   Recent Concern: Financial Resource Strain - High Risk (2024)    [facility]    Overall Financial Resource Strain (CARDIA)     Difficulty of

## 2024-10-10 ENCOUNTER — LAB REQUISITION (OUTPATIENT)
Dept: LAB | Facility: HOSPITAL | Age: 74
End: 2024-10-10
Payer: MEDICARE

## 2024-10-10 DIAGNOSIS — R30.0 DYSURIA: ICD-10-CM

## 2024-10-10 PROCEDURE — 87086 URINE CULTURE/COLONY COUNT: CPT

## 2024-10-11 LAB — BACTERIA UR CULT: NORMAL

## 2024-10-11 NOTE — PROGRESS NOTES
Patient Name: Rosemarie Grant : 1950        Date: 10/22/2024      Type of Appt: Consult    Reason for appt: consult per Brett Farias   M46.1 (ICD-10-CM) - Sacroiliitis (HCC)   M99.04 (ICD-10-CM) - Somatic dysfunction of sacroiliac joint   M54.16 (ICD-10-CM) - Lumbar radiculopathy   M48.062 (ICD-10-CM) - Spinal stenosis of lumbar region with neurogenic claudication   M47.817 (ICD-10-CM) - Lumbosacral spondylosis without myelopathy       Referred by: BRETT FARIAS    Studies done: 10/3/24 MRI OF LUMBAR SPINE AT [facility]  24 EMG BY DR RICE [facility]     Physical therapy: NO    Conservative Tx tried:  PAIN MANAGEMENT- DR RICE  9/3/24 LEFT L4-5 5-S1 NOY  GABAPENTIN 300 MG   IBUPROFEN 600 MG    Smoking:     Smoking status: Former        Current packs/day: 0.00       Average packs/day: 0.5 packs/day for 5.0 years (2.5 ttl pk-yrs)       Types: Cigarettes       Start date:        Quit date: 1981       Years since quittin.7    Smokeless tobacco: Never   Vaping Use    Vaping status: Never Used   Substance and Sexual Activity    Alcohol use: Yes       Comment: Socially    Drug use: No                           [facility]  [address] [place], [place] [id number]  P: [phone]  F: [phone]          Patient:  Rosemarie Grant  YOB: 1950  Date: 10/22/2024    The patient is a 74 y.o. female who presents today for evaluation of the following problems:     No chief complaint on file.       Referred by Brett Farias APRN -*      PAST MEDICAL, FAMILY AND SOCIAL HISTORY:  Past Medical History:   Diagnosis Date    Arthritis      Past Surgical History:   Procedure Laterality Date    AXILLARY SURGERY Right 2016    DR INGRID HILLS    LYMPH NODE BIOPSY Left     2010    LYMPH NODE DISSECTION Right 2016    EXCISION  OF RIGHT AXILLARY MASS performed by Ingrid Hills MD at [facility] [place]     Family History   Problem Relation Age of Onset    Arthritis Sister     Asthma

## 2024-10-11 NOTE — H&P (VIEW-ONLY)
Endocrine: Negative for heat intolerance.   Genitourinary:  Negative for difficulty urinating.   Musculoskeletal:  Negative for back pain and neck pain.   Skin:  Negative for rash.   Allergic/Immunologic: Negative for immunocompromised state.   Neurological:  Negative for headaches.   Hematological:  Does not bruise/bleed easily.   Psychiatric/Behavioral:  Negative for sleep disturbance.          I have personally reviewed the PMH, PSH, family history, home medications, social history, allergies, ROS.    Physical Exam:    There were no vitals filed for this visit.  There is no height or weight on file to calculate BMI.    Physical Exam  Vitals and nursing note reviewed.   Constitutional:       Appearance: Normal appearance.   HENT:      Head: Normocephalic and atraumatic.      Right Ear: External ear normal.      Left Ear: External ear normal.      Nose: Nose normal.      Mouth/Throat:      Pharynx: Oropharynx is clear.   Eyes:      Extraocular Movements: Extraocular movements intact.   Cardiovascular:      Pulses: Normal pulses.   Pulmonary:      Effort: Pulmonary effort is normal.   Abdominal:      Palpations: Abdomen is soft.   Genitourinary:     Rectum: Normal.   Musculoskeletal:         General: Normal range of motion.      Cervical back: Normal range of motion.      Comments: Limps to the left side.  With her hands on mine has a 1/5 left heel drop.  Able to walk on heels with a 3/5 left foot drop.  Decreased sensation left calf foot.  Absent reflexes left ankle.  Back flexion 40 degrees with radicular pain left buttock.  SLR +40 degrees left   Skin:     General: Skin is warm.      Comments: Left fourth toenail removed   Neurological:      General: No focal deficit present.   Psychiatric:         Mood and Affect: Mood normal.          I have reviewed all laboratory studies, reports, data, and pertinent images.    Electromyography (EMG)/Nerve conduction studies (NCS) Report: Lower Extremity  Name: Rosemarie WILLI

## 2024-10-22 ENCOUNTER — INITIAL CONSULT (OUTPATIENT)
Age: 74
End: 2024-10-22
Payer: MEDICARE

## 2024-10-22 ENCOUNTER — PREP FOR PROCEDURE (OUTPATIENT)
Dept: NEUROSURGERY | Age: 74
End: 2024-10-22

## 2024-10-22 VITALS
HEIGHT: 67 IN | DIASTOLIC BLOOD PRESSURE: 60 MMHG | WEIGHT: 176.2 LBS | SYSTOLIC BLOOD PRESSURE: 125 MMHG | TEMPERATURE: 97 F | BODY MASS INDEX: 27.65 KG/M2

## 2024-10-22 DIAGNOSIS — M06.9 RHEUMATOID ARTHRITIS INVOLVING MULTIPLE SITES, UNSPECIFIED WHETHER RHEUMATOID FACTOR PRESENT (HCC): ICD-10-CM

## 2024-10-22 DIAGNOSIS — I10 ESSENTIAL (PRIMARY) HYPERTENSION: ICD-10-CM

## 2024-10-22 DIAGNOSIS — M48.062 SPINAL STENOSIS OF LUMBAR REGION WITH NEUROGENIC CLAUDICATION: ICD-10-CM

## 2024-10-22 DIAGNOSIS — M48.062 LUMBAR STENOSIS WITH NEUROGENIC CLAUDICATION: Primary | ICD-10-CM

## 2024-10-22 PROCEDURE — 3078F DIAST BP <80 MM HG: CPT | Performed by: NEUROLOGICAL SURGERY

## 2024-10-22 PROCEDURE — 99204 OFFICE O/P NEW MOD 45 MIN: CPT

## 2024-10-22 PROCEDURE — 99204 OFFICE O/P NEW MOD 45 MIN: CPT | Performed by: NEUROLOGICAL SURGERY

## 2024-10-22 PROCEDURE — 3074F SYST BP LT 130 MM HG: CPT | Performed by: NEUROLOGICAL SURGERY

## 2024-10-22 PROCEDURE — 1123F ACP DISCUSS/DSCN MKR DOCD: CPT | Performed by: NEUROLOGICAL SURGERY

## 2024-10-22 RX ORDER — SODIUM CHLORIDE 0.9 % (FLUSH) 0.9 %
5-40 SYRINGE (ML) INJECTION PRN
Status: CANCELLED | OUTPATIENT
Start: 2024-10-22

## 2024-10-22 RX ORDER — ACETAMINOPHEN 325 MG/1
1000 TABLET ORAL ONCE
Status: CANCELLED | OUTPATIENT
Start: 2024-10-22 | End: 2024-10-22

## 2024-10-22 RX ORDER — LEFLUNOMIDE 20 MG/1
20 TABLET ORAL DAILY
COMMUNITY
Start: 2024-08-01

## 2024-10-22 RX ORDER — SODIUM CHLORIDE 0.9 % (FLUSH) 0.9 %
5-40 SYRINGE (ML) INJECTION EVERY 12 HOURS SCHEDULED
Status: CANCELLED | OUTPATIENT
Start: 2024-10-22

## 2024-10-22 RX ORDER — SODIUM CHLORIDE 9 MG/ML
INJECTION, SOLUTION INTRAVENOUS PRN
Status: CANCELLED | OUTPATIENT
Start: 2024-10-22

## 2024-10-22 RX ORDER — LATANOPROST 50 UG/ML
SOLUTION/ DROPS OPHTHALMIC
COMMUNITY
Start: 2024-08-23

## 2024-10-22 ASSESSMENT — ENCOUNTER SYMPTOMS
BACK PAIN: 0
NAUSEA: 0
SHORTNESS OF BREATH: 0
CONSTIPATION: 0
EYE PAIN: 0

## 2024-10-23 ENCOUNTER — HOSPITAL ENCOUNTER (OUTPATIENT)
Dept: NUCLEAR MEDICINE | Age: 74
Discharge: HOME | End: 2024-10-25
Attending: INTERNAL MEDICINE
Payer: MEDICARE

## 2024-10-23 ENCOUNTER — HOSPITAL ENCOUNTER (OUTPATIENT)
Age: 74
Discharge: HOME | End: 2024-10-25
Attending: INTERNAL MEDICINE
Payer: MEDICARE

## 2024-10-23 VITALS
BODY MASS INDEX: 27.65 KG/M2 | WEIGHT: 176.15 LBS | DIASTOLIC BLOOD PRESSURE: 80 MMHG | SYSTOLIC BLOOD PRESSURE: 134 MMHG | HEIGHT: 67 IN

## 2024-10-23 DIAGNOSIS — I25.10 CORONARY ARTERY DISEASE DUE TO LIPID RICH PLAQUE: ICD-10-CM

## 2024-10-23 DIAGNOSIS — I25.83 CORONARY ARTERY DISEASE DUE TO LIPID RICH PLAQUE: ICD-10-CM

## 2024-10-23 DIAGNOSIS — R07.9 CHEST PAIN, UNSPECIFIED TYPE: ICD-10-CM

## 2024-10-23 LAB
ECHO AO ROOT DIAM: 2.5 CM
ECHO AO ROOT INDEX: 1.3 CM/M2
ECHO AV AREA PEAK VELOCITY: 1.8 CM2
ECHO AV AREA VTI: 2 CM2
ECHO AV AREA/BSA PEAK VELOCITY: 0.9 CM2/M2
ECHO AV AREA/BSA VTI: 1 CM2/M2
ECHO AV CUSP MM: 1.8 CM
ECHO AV MEAN GRADIENT: 3 MMHG
ECHO AV MEAN VELOCITY: 0.9 M/S
ECHO AV PEAK GRADIENT: 6 MMHG
ECHO AV PEAK VELOCITY: 1.2 M/S
ECHO AV VELOCITY RATIO: 0.75
ECHO AV VTI: 29.7 CM
ECHO BSA: 1.94 M2
ECHO BSA: 1.94 M2
ECHO EST RA PRESSURE: 3 MMHG
ECHO LA DIAMETER INDEX: 2.08 CM/M2
ECHO LA DIAMETER: 4 CM
ECHO LA TO AORTIC ROOT RATIO: 1.6
ECHO LA VOL A-L A2C: 35 ML (ref 22–52)
ECHO LA VOL A-L A4C: 64 ML (ref 22–52)
ECHO LA VOL MOD A2C: 35 ML (ref 22–52)
ECHO LA VOL MOD A4C: 59 ML (ref 22–52)
ECHO LA VOLUME AREA LENGTH: 50 ML
ECHO LA VOLUME INDEX A-L A2C: 18 ML/M2 (ref 16–34)
ECHO LA VOLUME INDEX A-L A4C: 33 ML/M2 (ref 16–34)
ECHO LA VOLUME INDEX AREA LENGTH: 26 ML/M2 (ref 16–34)
ECHO LA VOLUME INDEX MOD A2C: 18 ML/M2 (ref 16–34)
ECHO LA VOLUME INDEX MOD A4C: 31 ML/M2 (ref 16–34)
ECHO LV E' LATERAL VELOCITY: 9.9 CM/S
ECHO LV E' SEPTAL VELOCITY: 6.7 CM/S
ECHO LV EDV A2C: 54 ML
ECHO LV EDV A4C: 59 ML
ECHO LV EDV BP: 58 ML (ref 56–104)
ECHO LV EDV INDEX A4C: 31 ML/M2
ECHO LV EDV INDEX BP: 30 ML/M2
ECHO LV EDV NDEX A2C: 28 ML/M2
ECHO LV EJECTION FRACTION A2C: 78 %
ECHO LV EJECTION FRACTION A4C: 69 %
ECHO LV EJECTION FRACTION BIPLANE: 74 % (ref 55–100)
ECHO LV ESV A2C: 12 ML
ECHO LV ESV A4C: 18 ML
ECHO LV ESV BP: 15 ML (ref 19–49)
ECHO LV ESV INDEX A2C: 6 ML/M2
ECHO LV ESV INDEX A4C: 9 ML/M2
ECHO LV ESV INDEX BP: 8 ML/M2
ECHO LV FRACTIONAL SHORTENING: 39 % (ref 28–44)
ECHO LV INTERNAL DIMENSION DIASTOLE INDEX: 1.98 CM/M2
ECHO LV INTERNAL DIMENSION DIASTOLIC: 3.8 CM (ref 3.9–5.3)
ECHO LV INTERNAL DIMENSION SYSTOLIC INDEX: 1.2 CM/M2
ECHO LV INTERNAL DIMENSION SYSTOLIC: 2.3 CM
ECHO LV IVSD: 1.3 CM (ref 0.6–0.9)
ECHO LV IVSS: 1.5 CM
ECHO LV MASS 2D: 183.4 G (ref 67–162)
ECHO LV MASS INDEX 2D: 95.5 G/M2 (ref 43–95)
ECHO LV POSTERIOR WALL DIASTOLIC: 1.4 CM (ref 0.6–0.9)
ECHO LV POSTERIOR WALL SYSTOLIC: 1.6 CM
ECHO LV RELATIVE WALL THICKNESS RATIO: 0.74
ECHO LVOT AREA: 2.5 CM2
ECHO LVOT AV VTI INDEX: 0.78
ECHO LVOT DIAM: 1.8 CM
ECHO LVOT MEAN GRADIENT: 2 MMHG
ECHO LVOT PEAK GRADIENT: 3 MMHG
ECHO LVOT PEAK VELOCITY: 0.9 M/S
ECHO LVOT STROKE VOLUME INDEX: 30.9 ML/M2
ECHO LVOT SV: 59.3 ML
ECHO LVOT VTI: 23.3 CM
ECHO MV A VELOCITY: 0.93 M/S
ECHO MV E DECELERATION TIME (DT): 173.6 MS
ECHO MV E VELOCITY: 0.84 M/S
ECHO MV E/A RATIO: 0.9
ECHO MV E/E' LATERAL: 8.48
ECHO MV E/E' RATIO (AVERAGED): 10.51
ECHO MV E/E' SEPTAL: 12.54
ECHO PV MAX VELOCITY: 0.9 M/S
ECHO PV PEAK GRADIENT: 3 MMHG
ECHO RIGHT VENTRICULAR SYSTOLIC PRESSURE (RVSP): 36 MMHG
ECHO RV INTERNAL DIMENSION: 3.1 CM
ECHO RV TAPSE: 1.5 CM (ref 1.7–?)
ECHO RVOT PEAK GRADIENT: 2 MMHG
ECHO RVOT PEAK VELOCITY: 0.7 M/S
ECHO TV REGURGITANT MAX VELOCITY: 2.87 M/S
ECHO TV REGURGITANT PEAK GRADIENT: 33 MMHG
NUC STRESS EJECTION FRACTION: 76 %
STRESS BASELINE DIAS BP: 70 MMHG
STRESS BASELINE HR: 66 BPM
STRESS BASELINE SYS BP: 151 MMHG
STRESS ESTIMATED WORKLOAD: 1 METS
STRESS PEAK DIAS BP: 76 MMHG
STRESS PEAK SYS BP: 155 MMHG
STRESS PERCENT HR ACHIEVED: 68 %
STRESS POST PEAK HR: 101 BPM
STRESS RATE PRESSURE PRODUCT: NORMAL BPM*MMHG
STRESS ST DEPRESSION: 0 MM
STRESS TARGET HR: 148 BPM
TID: 1

## 2024-10-23 PROCEDURE — 93306 TTE W/DOPPLER COMPLETE: CPT

## 2024-10-23 PROCEDURE — 78452 HT MUSCLE IMAGE SPECT MULT: CPT

## 2024-10-23 PROCEDURE — 2580000003 HC RX 258: Performed by: INTERNAL MEDICINE

## 2024-10-23 PROCEDURE — 93017 CV STRESS TEST TRACING ONLY: CPT

## 2024-10-23 PROCEDURE — A9502 TC99M TETROFOSMIN: HCPCS | Performed by: INTERNAL MEDICINE

## 2024-10-23 PROCEDURE — 6360000002 HC RX W HCPCS: Performed by: INTERNAL MEDICINE

## 2024-10-23 PROCEDURE — 3430000000 HC RX DIAGNOSTIC RADIOPHARMACEUTICAL: Performed by: INTERNAL MEDICINE

## 2024-10-23 RX ORDER — REGADENOSON 0.08 MG/ML
0.4 INJECTION, SOLUTION INTRAVENOUS
Status: COMPLETED | OUTPATIENT
Start: 2024-10-23 | End: 2024-10-23

## 2024-10-23 RX ORDER — SODIUM CHLORIDE 0.9 % (FLUSH) 0.9 %
10 SYRINGE (ML) INJECTION PRN
Status: COMPLETED | OUTPATIENT
Start: 2024-10-23 | End: 2024-10-23

## 2024-10-23 RX ADMIN — Medication 10 ML: at 08:56

## 2024-10-23 RX ADMIN — REGADENOSON 0.4 MG: 0.08 INJECTION, SOLUTION INTRAVENOUS at 08:55

## 2024-10-23 RX ADMIN — Medication 10 ML: at 07:43

## 2024-10-23 RX ADMIN — Medication 10 ML: at 08:55

## 2024-10-23 RX ADMIN — TETROFOSMIN 27 MILLICURIE: 1.38 INJECTION, POWDER, LYOPHILIZED, FOR SOLUTION INTRAVENOUS at 08:58

## 2024-10-23 RX ADMIN — TETROFOSMIN 9.2 MILLICURIE: 1.38 INJECTION, POWDER, LYOPHILIZED, FOR SOLUTION INTRAVENOUS at 07:43

## 2024-11-01 ENCOUNTER — OFFICE VISIT (OUTPATIENT)
Dept: CARDIOLOGY CLINIC | Age: 74
End: 2024-11-01

## 2024-11-01 VITALS
HEART RATE: 74 BPM | RESPIRATION RATE: 16 BRPM | SYSTOLIC BLOOD PRESSURE: 126 MMHG | BODY MASS INDEX: 27.72 KG/M2 | WEIGHT: 177 LBS | OXYGEN SATURATION: 97 % | DIASTOLIC BLOOD PRESSURE: 68 MMHG

## 2024-11-01 DIAGNOSIS — I10 ESSENTIAL (PRIMARY) HYPERTENSION: Primary | ICD-10-CM

## 2024-11-01 DIAGNOSIS — I70.0 ATHEROSCLEROSIS OF ABDOMINAL AORTA (HCC): ICD-10-CM

## 2024-11-01 ASSESSMENT — ENCOUNTER SYMPTOMS
ABDOMINAL PAIN: 0
COUGH: 0
APNEA: 0
CHEST TIGHTNESS: 0
WHEEZING: 0
VOMITING: 0
EYE REDNESS: 0
COLOR CHANGE: 0
RHINORRHEA: 0
DIARRHEA: 0
NAUSEA: 0
SHORTNESS OF BREATH: 0
CONSTIPATION: 0

## 2024-11-01 NOTE — PROGRESS NOTES
Head: Normocephalic and atraumatic.      Mouth/Throat:      Mouth: Mucous membranes are moist.      Pharynx: Oropharynx is clear.   Eyes:      Extraocular Movements: Extraocular movements intact.      Conjunctiva/sclera: Conjunctivae normal.      Pupils: Pupils are equal, round, and reactive to light.   Cardiovascular:      Rate and Rhythm: Normal rate and regular rhythm.      Pulses: Normal pulses.      Heart sounds: Normal heart sounds.   Pulmonary:      Effort: Pulmonary effort is normal.      Breath sounds: Normal breath sounds.   Abdominal:      General: Abdomen is flat. Bowel sounds are normal.      Palpations: Abdomen is soft.   Musculoskeletal:         General: Normal range of motion.      Cervical back: Normal range of motion and neck supple.   Skin:     General: Skin is warm.   Neurological:      General: No focal deficit present.      Mental Status: She is alert and oriented to person, place, and time. Mental status is at baseline.   Psychiatric:         Mood and Affect: Mood normal.        EKG: normal sinus rhythm    No orders of the defined types were placed in this encounter.        The ASCVD Risk score (Sage DK, et al., 2019) failed to calculate for the following reasons:    Cannot find a previous HDL lab    Cannot find a previous total cholesterol lab     ASSESSMENT:     Diagnosis Orders   1. Essential (primary) hypertension        2. Atherosclerosis of abdominal aorta (HCC)            PLAN:   Current clearance prior to undergoing back surgery  Patient is an intermediate risk patient for this procedure  No signs of ischemia as per nuclear stress test 10/23/2024  No valvular disease as per TTE 10/23/2024  No major arrhythmias  Okay to proceed with surgery as planned no further testing needed    Abnormal CT abdomen pelvis  CT 8/9/2024 reported as calcification on the iliac arteries  I would like to obtain an ARACELI  Patient endorses leg pain which could be due to sciatica nerve versus lower

## 2024-11-04 PROBLEM — M48.062 SPINAL STENOSIS OF LUMBAR REGION WITH NEUROGENIC CLAUDICATION: Status: ACTIVE | Noted: 2024-10-22

## 2024-11-05 ENCOUNTER — OFFICE VISIT (OUTPATIENT)
Age: 74
End: 2024-11-05
Payer: MEDICARE

## 2024-11-05 VITALS
WEIGHT: 177 LBS | DIASTOLIC BLOOD PRESSURE: 76 MMHG | HEIGHT: 67 IN | BODY MASS INDEX: 27.78 KG/M2 | SYSTOLIC BLOOD PRESSURE: 136 MMHG | TEMPERATURE: 97.3 F

## 2024-11-05 DIAGNOSIS — M48.062 SPINAL STENOSIS OF LUMBAR REGION WITH NEUROGENIC CLAUDICATION: ICD-10-CM

## 2024-11-05 DIAGNOSIS — M46.1 SACROILIITIS (HCC): Primary | ICD-10-CM

## 2024-11-05 DIAGNOSIS — M54.16 LUMBAR RADICULOPATHY: ICD-10-CM

## 2024-11-05 DIAGNOSIS — M47.817 LUMBOSACRAL SPONDYLOSIS WITHOUT MYELOPATHY: ICD-10-CM

## 2024-11-05 PROCEDURE — 99214 OFFICE O/P EST MOD 30 MIN: CPT | Performed by: NURSE PRACTITIONER

## 2024-11-05 RX ORDER — OXYCODONE AND ACETAMINOPHEN 5; 325 MG/1; MG/1
1 TABLET ORAL DAILY PRN
Qty: 10 TABLET | Refills: 0 | Status: SHIPPED | OUTPATIENT
Start: 2024-11-05 | End: 2024-11-15

## 2024-11-05 ASSESSMENT — ENCOUNTER SYMPTOMS
SORE THROAT: 0
BACK PAIN: 1
SHORTNESS OF BREATH: 0
ABDOMINAL PAIN: 0

## 2024-11-05 NOTE — PROGRESS NOTES
Rosemarie Grant  (1950)    2024    Subjective:     Rosemarie Grant is 74 y.o. female who complains today of:    Chief Complaint   Patient presents with    Back Pain    Leg Pain     left         Allergies:  Naproxen, Toradol [ketorolac tromethamine], and Ultram [tramadol]    Past Medical History:   Diagnosis Date    Arthritis      Past Surgical History:   Procedure Laterality Date    AXILLARY SURGERY Right 2016    DR ANAM RODRIGUEZ    LYMPH NODE BIOPSY Left     2010    LYMPH NODE DISSECTION Right 2016    EXCISION  OF RIGHT AXILLARY MASS [place] [facility] [place]     Family History   Problem Relation Age of Onset    Arthritis Sister     Asthma Maternal Cousin      Social History     Socioeconomic History    Marital status:      Spouse name: Not on file    Number of children: Not on file    Years of education: Not on file    Highest education level: Not on file   Occupational History    Not on file   Tobacco Use    Smoking status: Former     Current packs/day: 0.00     Average packs/day: 0.5 packs/day for 5.0 years (2.5 ttl pk-yrs)     Types: Cigarettes     Start date:      Quit date:      Years since quittin.8    Smokeless tobacco: Never   Vaping Use    Vaping status: Never Used   Substance and Sexual Activity    Alcohol use: Yes     Comment: Socially    Drug use: No    Sexual activity: Never   Other Topics Concern    Not on file   Social History Narrative    Not on file     Social Determinants of Health     Financial Resource Strain: Low Risk  (2024)    Overall Financial Resource Strain (CARDIA)     Difficulty of Paying Living Expenses: Not hard at all   Recent Concern: Financial Resource Strain - High Risk (2024)    [facility]    Overall Financial Resource Strain (CARDIA)     Difficulty of Paying Living Expenses: Very hard   Food Insecurity: No Food Insecurity

## 2024-11-08 ENCOUNTER — HOSPITAL ENCOUNTER (OUTPATIENT)
Dept: PREADMISSION TESTING | Age: 74
Discharge: HOME | End: 2024-11-12
Attending: INTERNAL MEDICINE
Payer: MEDICARE

## 2024-11-08 VITALS
DIASTOLIC BLOOD PRESSURE: 82 MMHG | HEART RATE: 64 BPM | OXYGEN SATURATION: 96 % | TEMPERATURE: 97.6 F | WEIGHT: 172 LBS | BODY MASS INDEX: 27.64 KG/M2 | SYSTOLIC BLOOD PRESSURE: 142 MMHG | RESPIRATION RATE: 18 BRPM | HEIGHT: 66 IN

## 2024-11-08 DIAGNOSIS — I10 ESSENTIAL (PRIMARY) HYPERTENSION: ICD-10-CM

## 2024-11-08 LAB
ANION GAP SERPL CALCULATED.3IONS-SCNC: 9 MEQ/L (ref 9–15)
BUN SERPL-MCNC: 8 MG/DL (ref 8–23)
CALCIUM SERPL-MCNC: 8.9 MG/DL (ref 8.5–9.9)
CHLORIDE SERPL-SCNC: 104 MEQ/L (ref 95–107)
CO2 SERPL-SCNC: 28 MEQ/L (ref 20–31)
CREAT SERPL-MCNC: 0.69 MG/DL (ref 0.5–0.9)
GLUCOSE SERPL-MCNC: 97 MG/DL (ref 70–99)
POTASSIUM SERPL-SCNC: 3.7 MEQ/L (ref 3.4–4.9)
SODIUM SERPL-SCNC: 141 MEQ/L (ref 135–144)

## 2024-11-08 PROCEDURE — 86901 BLOOD TYPING SEROLOGIC RH(D): CPT

## 2024-11-08 PROCEDURE — 86850 RBC ANTIBODY SCREEN: CPT

## 2024-11-08 PROCEDURE — 86900 BLOOD TYPING SEROLOGIC ABO: CPT

## 2024-11-08 PROCEDURE — 87641 MR-STAPH DNA AMP PROBE: CPT

## 2024-11-08 NOTE — PROGRESS NOTES
PAT appointment completed today.  Patient is scheduled for Left and bilateral Lumbar 4-5, Sacral 1 decompression left Lumbar 5-Sacral 1 discectomy on 11/15/24 with Dr. Lydia Hilton.    Discussed preoperative instructions: NPO status, transportation at discharge, No alcohol use 24 hours and no tobacco use the night before or morning of, no use of deoderant/lotion/powder, remove jewelry and body piercing's and no make up or contact lenses on the day of surgery.   Patient was instructed to enter through the surgical outpatient entrance and check in at the [place] desk.    Patient was instructed to take this medication with a small sip of water the morning of surgery: Carvedilol    Patient Objection to Receiving Blood Products: No    11/1/24, Dr. Kent, cardiology. Cleared, no further testing needed. Intermediate Risk.

## 2024-11-09 LAB
ABO + RH BLD: NORMAL
BLD GP AB SCN SERPL QL: NORMAL
MRSA, DNA, NASAL: NEGATIVE
SPECIMEN DESCRIPTION: NORMAL

## 2024-11-15 ENCOUNTER — ANESTHESIA EVENT (OUTPATIENT)
Dept: OPERATING ROOM | Age: 74
End: 2024-11-15
Payer: MEDICARE

## 2024-11-15 ENCOUNTER — HOSPITAL ENCOUNTER (OUTPATIENT)
Age: 74
Setting detail: OBSERVATION
Discharge: NURSING, REHAB OR LONG-TERM CARE | End: 2024-11-20
Attending: NEUROLOGICAL SURGERY | Admitting: NEUROLOGICAL SURGERY
Payer: MEDICARE

## 2024-11-15 ENCOUNTER — APPOINTMENT (OUTPATIENT)
Dept: GENERAL RADIOLOGY | Age: 74
End: 2024-11-15
Attending: NEUROLOGICAL SURGERY
Payer: MEDICARE

## 2024-11-15 ENCOUNTER — ANESTHESIA (OUTPATIENT)
Dept: OPERATING ROOM | Age: 74
End: 2024-11-15
Payer: MEDICARE

## 2024-11-15 DIAGNOSIS — M48.062 SPINAL STENOSIS OF LUMBAR REGION WITH NEUROGENIC CLAUDICATION: Primary | ICD-10-CM

## 2024-11-15 PROBLEM — Z74.09 IMPAIRED MOBILITY AND ACTIVITIES OF DAILY LIVING: Status: ACTIVE | Noted: 2024-11-15

## 2024-11-15 PROBLEM — M06.9 RHEUMATOID ARTHRITIS (HCC): Status: ACTIVE | Noted: 2022-12-01

## 2024-11-15 PROBLEM — M51.369 DDD (DEGENERATIVE DISC DISEASE), LUMBAR: Status: ACTIVE | Noted: 2024-04-22

## 2024-11-15 PROBLEM — G89.18 POST-OP PAIN: Status: ACTIVE | Noted: 2024-11-15

## 2024-11-15 PROBLEM — M79.7 FIBROMYALGIA: Status: ACTIVE | Noted: 2022-12-14

## 2024-11-15 PROBLEM — S39.012A LUMBAR STRAIN: Status: ACTIVE | Noted: 2024-04-22

## 2024-11-15 PROBLEM — N28.1 RENAL CYST: Status: ACTIVE | Noted: 2024-11-15

## 2024-11-15 PROBLEM — M79.606 PAIN OF LOWER EXTREMITY: Status: ACTIVE | Noted: 2024-11-15

## 2024-11-15 PROBLEM — N39.41 URGE INCONTINENCE OF URINE: Status: ACTIVE | Noted: 2024-01-08

## 2024-11-15 PROBLEM — M70.62 TROCHANTERIC BURSITIS OF LEFT HIP: Status: ACTIVE | Noted: 2024-03-27

## 2024-11-15 PROBLEM — R04.0 EPISTAXIS: Status: ACTIVE | Noted: 2024-04-22

## 2024-11-15 PROBLEM — Z78.9 IMPAIRED MOBILITY AND ACTIVITIES OF DAILY LIVING: Status: ACTIVE | Noted: 2024-11-15

## 2024-11-15 PROBLEM — N94.9 NONINFLAMMATORY DISORDER OF THE FEMALE GENITAL ORGANS: Status: ACTIVE | Noted: 2024-04-22

## 2024-11-15 PROBLEM — E78.5 HLD (HYPERLIPIDEMIA): Status: ACTIVE | Noted: 2023-03-23

## 2024-11-15 PROBLEM — M54.50 LOW BACK PAIN: Status: ACTIVE | Noted: 2022-02-11

## 2024-11-15 PROCEDURE — 97166 OT EVAL MOD COMPLEX 45 MIN: CPT

## 2024-11-15 PROCEDURE — 7100000001 HC PACU RECOVERY - ADDTL 15 MIN: Performed by: NEUROLOGICAL SURGERY

## 2024-11-15 PROCEDURE — 2720000010 HC SURG SUPPLY STERILE: Performed by: NEUROLOGICAL SURGERY

## 2024-11-15 PROCEDURE — A4217 STERILE WATER/SALINE, 500 ML: HCPCS | Performed by: NEUROLOGICAL SURGERY

## 2024-11-15 PROCEDURE — 94150 VITAL CAPACITY TEST: CPT

## 2024-11-15 PROCEDURE — C1713 ANCHOR/SCREW BN/BN,TIS/BN: HCPCS | Performed by: NEUROLOGICAL SURGERY

## 2024-11-15 PROCEDURE — 6360000002 HC RX W HCPCS: Performed by: NEUROLOGICAL SURGERY

## 2024-11-15 PROCEDURE — 2580000003 HC RX 258: Performed by: NEUROLOGICAL SURGERY

## 2024-11-15 PROCEDURE — 3700000001 HC ADD 15 MINUTES (ANESTHESIA): Performed by: NEUROLOGICAL SURGERY

## 2024-11-15 PROCEDURE — 6370000000 HC RX 637 (ALT 250 FOR IP): Performed by: NEUROLOGICAL SURGERY

## 2024-11-15 PROCEDURE — 2500000003 HC RX 250 WO HCPCS: Performed by: NURSE ANESTHETIST, CERTIFIED REGISTERED

## 2024-11-15 PROCEDURE — 63047 LAM FACETEC & FORAMOT LUMBAR: CPT | Performed by: NEUROLOGICAL SURGERY

## 2024-11-15 PROCEDURE — 2580000003 HC RX 258: Performed by: NURSE ANESTHETIST, CERTIFIED REGISTERED

## 2024-11-15 PROCEDURE — G0378 HOSPITAL OBSERVATION PER HR: HCPCS

## 2024-11-15 PROCEDURE — 63048 LAM FACETEC &FORAMOT EA ADDL: CPT | Performed by: NEUROLOGICAL SURGERY

## 2024-11-15 PROCEDURE — 2709999900 HC NON-CHARGEABLE SUPPLY: Performed by: NEUROLOGICAL SURGERY

## 2024-11-15 PROCEDURE — 2500000003 HC RX 250 WO HCPCS: Performed by: NEUROLOGICAL SURGERY

## 2024-11-15 PROCEDURE — 6360000002 HC RX W HCPCS: Performed by: ANESTHESIOLOGY

## 2024-11-15 PROCEDURE — 3600000014 HC SURGERY LEVEL 4 ADDTL 15MIN: Performed by: NEUROLOGICAL SURGERY

## 2024-11-15 PROCEDURE — 3700000000 HC ANESTHESIA ATTENDED CARE: Performed by: NEUROLOGICAL SURGERY

## 2024-11-15 PROCEDURE — 3600000004 HC SURGERY LEVEL 4 BASE: Performed by: NEUROLOGICAL SURGERY

## 2024-11-15 PROCEDURE — 7100000000 HC PACU RECOVERY - FIRST 15 MIN: Performed by: NEUROLOGICAL SURGERY

## 2024-11-15 PROCEDURE — 6360000002 HC RX W HCPCS: Performed by: NURSE ANESTHETIST, CERTIFIED REGISTERED

## 2024-11-15 PROCEDURE — 97162 PT EVAL MOD COMPLEX 30 MIN: CPT

## 2024-11-15 RX ORDER — POLYETHYLENE GLYCOL 3350 17 G/17G
17 POWDER, FOR SOLUTION ORAL DAILY PRN
Status: DISCONTINUED | OUTPATIENT
Start: 2024-11-15 | End: 2024-11-20 | Stop reason: HOSPADM

## 2024-11-15 RX ORDER — OXYCODONE HYDROCHLORIDE 5 MG/1
5 TABLET ORAL
Status: DISCONTINUED | OUTPATIENT
Start: 2024-11-15 | End: 2024-11-15 | Stop reason: HOSPADM

## 2024-11-15 RX ORDER — METOCLOPRAMIDE HYDROCHLORIDE 5 MG/ML
10 INJECTION INTRAMUSCULAR; INTRAVENOUS
Status: DISCONTINUED | OUTPATIENT
Start: 2024-11-15 | End: 2024-11-15 | Stop reason: HOSPADM

## 2024-11-15 RX ORDER — ATORVASTATIN CALCIUM 40 MG/1
40 TABLET, FILM COATED ORAL DAILY
Status: DISCONTINUED | OUTPATIENT
Start: 2024-11-15 | End: 2024-11-20 | Stop reason: HOSPADM

## 2024-11-15 RX ORDER — CARVEDILOL 12.5 MG/1
6.25 TABLET ORAL 2 TIMES DAILY WITH MEALS
Status: DISCONTINUED | OUTPATIENT
Start: 2024-11-15 | End: 2024-11-20 | Stop reason: HOSPADM

## 2024-11-15 RX ORDER — LATANOPROST 50 UG/ML
1 SOLUTION/ DROPS OPHTHALMIC NIGHTLY
Status: DISCONTINUED | OUTPATIENT
Start: 2024-11-15 | End: 2024-11-20 | Stop reason: HOSPADM

## 2024-11-15 RX ORDER — OXYCODONE HYDROCHLORIDE 5 MG/1
5 TABLET ORAL EVERY 4 HOURS PRN
Status: DISCONTINUED | OUTPATIENT
Start: 2024-11-15 | End: 2024-11-20 | Stop reason: HOSPADM

## 2024-11-15 RX ORDER — ONDANSETRON 2 MG/ML
4 INJECTION INTRAMUSCULAR; INTRAVENOUS
Status: DISCONTINUED | OUTPATIENT
Start: 2024-11-15 | End: 2024-11-15 | Stop reason: HOSPADM

## 2024-11-15 RX ORDER — GABAPENTIN 300 MG/1
300 CAPSULE ORAL 3 TIMES DAILY
Status: DISCONTINUED | OUTPATIENT
Start: 2024-11-15 | End: 2024-11-16

## 2024-11-15 RX ORDER — SODIUM CHLORIDE 0.9 % (FLUSH) 0.9 %
5-40 SYRINGE (ML) INJECTION EVERY 12 HOURS SCHEDULED
Status: DISCONTINUED | OUTPATIENT
Start: 2024-11-15 | End: 2024-11-15 | Stop reason: HOSPADM

## 2024-11-15 RX ORDER — SODIUM CHLORIDE 0.9 % (FLUSH) 0.9 %
5-40 SYRINGE (ML) INJECTION PRN
Status: DISCONTINUED | OUTPATIENT
Start: 2024-11-15 | End: 2024-11-20 | Stop reason: HOSPADM

## 2024-11-15 RX ORDER — ROCURONIUM BROMIDE 10 MG/ML
INJECTION, SOLUTION INTRAVENOUS
Status: DISCONTINUED | OUTPATIENT
Start: 2024-11-15 | End: 2024-11-15 | Stop reason: SDUPTHER

## 2024-11-15 RX ORDER — SODIUM CHLORIDE 9 MG/ML
INJECTION, SOLUTION INTRAVENOUS PRN
Status: DISCONTINUED | OUTPATIENT
Start: 2024-11-15 | End: 2024-11-20 | Stop reason: HOSPADM

## 2024-11-15 RX ORDER — ACETAMINOPHEN 325 MG/1
650 TABLET ORAL EVERY 6 HOURS SCHEDULED
Status: DISCONTINUED | OUTPATIENT
Start: 2024-11-15 | End: 2024-11-20 | Stop reason: HOSPADM

## 2024-11-15 RX ORDER — SODIUM CHLORIDE 0.9 % (FLUSH) 0.9 %
5-40 SYRINGE (ML) INJECTION EVERY 12 HOURS SCHEDULED
Status: DISCONTINUED | OUTPATIENT
Start: 2024-11-15 | End: 2024-11-20 | Stop reason: HOSPADM

## 2024-11-15 RX ORDER — AMLODIPINE BESYLATE 2.5 MG/1
2.5 TABLET ORAL DAILY
Status: DISCONTINUED | OUTPATIENT
Start: 2024-11-15 | End: 2024-11-20 | Stop reason: HOSPADM

## 2024-11-15 RX ORDER — SODIUM CHLORIDE 0.9 % (FLUSH) 0.9 %
5-40 SYRINGE (ML) INJECTION PRN
Status: DISCONTINUED | OUTPATIENT
Start: 2024-11-15 | End: 2024-11-15 | Stop reason: HOSPADM

## 2024-11-15 RX ORDER — MAGNESIUM HYDROXIDE 1200 MG/15ML
LIQUID ORAL CONTINUOUS PRN
Status: COMPLETED | OUTPATIENT
Start: 2024-11-15 | End: 2024-11-15

## 2024-11-15 RX ORDER — SODIUM CHLORIDE 9 MG/ML
INJECTION, SOLUTION INTRAVENOUS CONTINUOUS
Status: DISCONTINUED | OUTPATIENT
Start: 2024-11-15 | End: 2024-11-20 | Stop reason: HOSPADM

## 2024-11-15 RX ORDER — SODIUM CHLORIDE 9 MG/ML
INJECTION, SOLUTION INTRAVENOUS PRN
Status: DISCONTINUED | OUTPATIENT
Start: 2024-11-15 | End: 2024-11-15 | Stop reason: HOSPADM

## 2024-11-15 RX ORDER — VANCOMYCIN HYDROCHLORIDE 1 G/20ML
INJECTION, POWDER, LYOPHILIZED, FOR SOLUTION INTRAVENOUS PRN
Status: DISCONTINUED | OUTPATIENT
Start: 2024-11-15 | End: 2024-11-15 | Stop reason: ALTCHOICE

## 2024-11-15 RX ORDER — PROPOFOL 10 MG/ML
INJECTION, EMULSION INTRAVENOUS
Status: DISCONTINUED | OUTPATIENT
Start: 2024-11-15 | End: 2024-11-15 | Stop reason: SDUPTHER

## 2024-11-15 RX ORDER — LANOLIN ALCOHOL/MO/W.PET/CERES
400 CREAM (GRAM) TOPICAL DAILY
Status: DISCONTINUED | OUTPATIENT
Start: 2024-11-15 | End: 2024-11-20 | Stop reason: HOSPADM

## 2024-11-15 RX ORDER — DEXAMETHASONE SODIUM PHOSPHATE 4 MG/ML
8 INJECTION, SOLUTION INTRA-ARTICULAR; INTRALESIONAL; INTRAMUSCULAR; INTRAVENOUS; SOFT TISSUE ONCE
Status: COMPLETED | OUTPATIENT
Start: 2024-11-15 | End: 2024-11-15

## 2024-11-15 RX ORDER — MEPERIDINE HYDROCHLORIDE 25 MG/ML
12.5 INJECTION INTRAMUSCULAR; INTRAVENOUS; SUBCUTANEOUS
Status: DISCONTINUED | OUTPATIENT
Start: 2024-11-15 | End: 2024-11-15 | Stop reason: HOSPADM

## 2024-11-15 RX ORDER — BISACODYL 5 MG/1
5 TABLET, DELAYED RELEASE ORAL DAILY
Status: DISCONTINUED | OUTPATIENT
Start: 2024-11-15 | End: 2024-11-20 | Stop reason: HOSPADM

## 2024-11-15 RX ORDER — FENTANYL CITRATE 0.05 MG/ML
50 INJECTION, SOLUTION INTRAMUSCULAR; INTRAVENOUS EVERY 10 MIN PRN
Status: DISCONTINUED | OUTPATIENT
Start: 2024-11-15 | End: 2024-11-15 | Stop reason: HOSPADM

## 2024-11-15 RX ORDER — SODIUM CHLORIDE 9 MG/ML
INJECTION, SOLUTION INTRAVENOUS
Status: DISCONTINUED | OUTPATIENT
Start: 2024-11-15 | End: 2024-11-15 | Stop reason: SDUPTHER

## 2024-11-15 RX ORDER — ACETAMINOPHEN 500 MG
1000 TABLET ORAL ONCE
Status: COMPLETED | OUTPATIENT
Start: 2024-11-15 | End: 2024-11-15

## 2024-11-15 RX ORDER — OXYCODONE AND ACETAMINOPHEN 5; 325 MG/1; MG/1
1 TABLET ORAL EVERY 6 HOURS PRN
Status: DISCONTINUED | OUTPATIENT
Start: 2024-11-15 | End: 2024-11-15 | Stop reason: SDUPTHER

## 2024-11-15 RX ORDER — ONDANSETRON 2 MG/ML
4 INJECTION INTRAMUSCULAR; INTRAVENOUS EVERY 6 HOURS PRN
Status: DISCONTINUED | OUTPATIENT
Start: 2024-11-15 | End: 2024-11-20 | Stop reason: HOSPADM

## 2024-11-15 RX ORDER — LEFLUNOMIDE 20 MG/1
20 TABLET ORAL DAILY
Status: DISCONTINUED | OUTPATIENT
Start: 2024-11-15 | End: 2024-11-20 | Stop reason: HOSPADM

## 2024-11-15 RX ORDER — OXYCODONE AND ACETAMINOPHEN 5; 325 MG/1; MG/1
1 TABLET ORAL EVERY 6 HOURS PRN
Qty: 28 TABLET | Refills: 0 | Status: SHIPPED | OUTPATIENT
Start: 2024-11-15 | End: 2024-11-22

## 2024-11-15 RX ORDER — LIDOCAINE HYDROCHLORIDE AND EPINEPHRINE 10; 10 MG/ML; UG/ML
INJECTION, SOLUTION INFILTRATION; PERINEURAL PRN
Status: DISCONTINUED | OUTPATIENT
Start: 2024-11-15 | End: 2024-11-15 | Stop reason: ALTCHOICE

## 2024-11-15 RX ORDER — DIPHENHYDRAMINE HYDROCHLORIDE 50 MG/ML
12.5 INJECTION INTRAMUSCULAR; INTRAVENOUS
Status: DISCONTINUED | OUTPATIENT
Start: 2024-11-15 | End: 2024-11-15 | Stop reason: HOSPADM

## 2024-11-15 RX ORDER — ONDANSETRON 2 MG/ML
INJECTION INTRAMUSCULAR; INTRAVENOUS
Status: DISCONTINUED | OUTPATIENT
Start: 2024-11-15 | End: 2024-11-15 | Stop reason: SDUPTHER

## 2024-11-15 RX ORDER — FENTANYL CITRATE 50 UG/ML
INJECTION, SOLUTION INTRAMUSCULAR; INTRAVENOUS
Status: DISCONTINUED | OUTPATIENT
Start: 2024-11-15 | End: 2024-11-15 | Stop reason: SDUPTHER

## 2024-11-15 RX ORDER — VITAMIN B COMPLEX
1000 TABLET ORAL DAILY
Status: DISCONTINUED | OUTPATIENT
Start: 2024-11-15 | End: 2024-11-20 | Stop reason: HOSPADM

## 2024-11-15 RX ORDER — OXYCODONE HYDROCHLORIDE 5 MG/1
5 TABLET ORAL EVERY 6 HOURS PRN
Status: DISCONTINUED | OUTPATIENT
Start: 2024-11-15 | End: 2024-11-20 | Stop reason: HOSPADM

## 2024-11-15 RX ORDER — DEXAMETHASONE SODIUM PHOSPHATE 10 MG/ML
INJECTION INTRAMUSCULAR; INTRAVENOUS
Status: DISCONTINUED | OUTPATIENT
Start: 2024-11-15 | End: 2024-11-15 | Stop reason: SDUPTHER

## 2024-11-15 RX ORDER — NALOXONE HYDROCHLORIDE 0.4 MG/ML
INJECTION, SOLUTION INTRAMUSCULAR; INTRAVENOUS; SUBCUTANEOUS PRN
Status: DISCONTINUED | OUTPATIENT
Start: 2024-11-15 | End: 2024-11-15 | Stop reason: HOSPADM

## 2024-11-15 RX ADMIN — FENTANYL CITRATE 100 MCG: 50 INJECTION, SOLUTION INTRAMUSCULAR; INTRAVENOUS at 07:42

## 2024-11-15 RX ADMIN — CEFAZOLIN 2000 MG: 2 INJECTION, POWDER, FOR SOLUTION INTRAMUSCULAR; INTRAVENOUS at 08:00

## 2024-11-15 RX ADMIN — OXYCODONE 5 MG: 5 TABLET ORAL at 21:31

## 2024-11-15 RX ADMIN — PROPOFOL 200 MG: 10 INJECTION, EMULSION INTRAVENOUS at 07:47

## 2024-11-15 RX ADMIN — ACETAMINOPHEN 325MG 650 MG: 325 TABLET ORAL at 17:50

## 2024-11-15 RX ADMIN — DEXAMETHASONE SODIUM PHOSPHATE 8 MG: 4 INJECTION INTRA-ARTICULAR; INTRALESIONAL; INTRAMUSCULAR; INTRAVENOUS; SOFT TISSUE at 06:24

## 2024-11-15 RX ADMIN — SODIUM CHLORIDE 100 ML/HR: 9 INJECTION, SOLUTION INTRAVENOUS at 06:23

## 2024-11-15 RX ADMIN — HYDROMORPHONE HYDROCHLORIDE 0.5 MG: 1 INJECTION, SOLUTION INTRAMUSCULAR; INTRAVENOUS; SUBCUTANEOUS at 13:00

## 2024-11-15 RX ADMIN — CEFAZOLIN 2000 MG: 2 INJECTION, POWDER, FOR SOLUTION INTRAMUSCULAR; INTRAVENOUS at 18:04

## 2024-11-15 RX ADMIN — OXYCODONE 5 MG: 5 TABLET ORAL at 14:23

## 2024-11-15 RX ADMIN — ONDANSETRON 4 MG: 2 INJECTION, SOLUTION INTRAMUSCULAR; INTRAVENOUS at 07:59

## 2024-11-15 RX ADMIN — FENTANYL CITRATE 50 MCG: 0.05 INJECTION, SOLUTION INTRAMUSCULAR; INTRAVENOUS at 11:30

## 2024-11-15 RX ADMIN — Medication 10 ML: at 12:54

## 2024-11-15 RX ADMIN — Medication 1000 UNITS: at 17:52

## 2024-11-15 RX ADMIN — OXYCODONE 5 MG: 5 TABLET ORAL at 17:51

## 2024-11-15 RX ADMIN — BISACODYL 5 MG: 5 TABLET, COATED ORAL at 17:52

## 2024-11-15 RX ADMIN — SODIUM CHLORIDE: 9 INJECTION, SOLUTION INTRAVENOUS at 07:41

## 2024-11-15 RX ADMIN — DEXAMETHASONE SODIUM PHOSPHATE 10 MG: 10 INJECTION INTRAMUSCULAR; INTRAVENOUS at 07:59

## 2024-11-15 RX ADMIN — ROCURONIUM BROMIDE 50 MG: 10 INJECTION, SOLUTION INTRAVENOUS at 07:47

## 2024-11-15 RX ADMIN — SUGAMMADEX 200 MG: 100 INJECTION, SOLUTION INTRAVENOUS at 10:23

## 2024-11-15 RX ADMIN — SODIUM CHLORIDE: 9 INJECTION, SOLUTION INTRAVENOUS at 11:29

## 2024-11-15 RX ADMIN — Medication 400 MG: at 17:51

## 2024-11-15 RX ADMIN — FENTANYL CITRATE 50 MCG: 0.05 INJECTION, SOLUTION INTRAMUSCULAR; INTRAVENOUS at 11:07

## 2024-11-15 RX ADMIN — ACETAMINOPHEN 1000 MG: 500 TABLET ORAL at 06:24

## 2024-11-15 RX ADMIN — AMLODIPINE BESYLATE 2.5 MG: 2.5 TABLET ORAL at 17:52

## 2024-11-15 RX ADMIN — LEFLUNOMIDE 20 MG: 20 TABLET ORAL at 18:16

## 2024-11-15 RX ADMIN — ATORVASTATIN CALCIUM 40 MG: 40 TABLET, FILM COATED ORAL at 17:52

## 2024-11-15 RX ADMIN — SODIUM CHLORIDE: 9 INJECTION, SOLUTION INTRAVENOUS at 17:49

## 2024-11-15 ASSESSMENT — PAIN DESCRIPTION - LOCATION
LOCATION: BACK
LOCATION: BACK
LOCATION: BACK;HIP
LOCATION: BACK

## 2024-11-15 ASSESSMENT — PAIN DESCRIPTION - ORIENTATION
ORIENTATION: MID
ORIENTATION: MID;LOWER
ORIENTATION: MID
ORIENTATION: MID
ORIENTATION: MID;LOWER
ORIENTATION: MID
ORIENTATION: LOWER

## 2024-11-15 ASSESSMENT — PAIN DESCRIPTION - DESCRIPTORS
DESCRIPTORS: SHOOTING
DESCRIPTORS: BURNING;ACHING
DESCRIPTORS: SHOOTING

## 2024-11-15 ASSESSMENT — PAIN SCALES - GENERAL
PAINLEVEL_OUTOF10: 8
PAINLEVEL_OUTOF10: 9
PAINLEVEL_OUTOF10: 8
PAINLEVEL_OUTOF10: 6
PAINLEVEL_OUTOF10: 5
PAINLEVEL_OUTOF10: 7
PAINLEVEL_OUTOF10: 6
PAINLEVEL_OUTOF10: 8
PAINLEVEL_OUTOF10: 8
PAINLEVEL_OUTOF10: 7

## 2024-11-15 ASSESSMENT — PAIN - FUNCTIONAL ASSESSMENT: PAIN_FUNCTIONAL_ASSESSMENT: 0-10

## 2024-11-15 NOTE — ANESTHESIA PRE PROCEDURE
Department of Anesthesiology  Preprocedure Note       Name:  Rosemarie Grant   Age:  74 y.o.  :  1950                                          MRN:  [id number]         Date:  11/15/2024      Surgeon: Surgeon(s):  Lydia Hilton MD    Procedure: Procedure(s):  Left and bilateral L4-5-S1 decompression left L5-S1 discectomy.    Medications prior to admission:   Prior to Admission medications    Medication Sig Start Date End Date Taking? Authorizing Provider   oxyCODONE-acetaminophen (PERCOCET) 5-325 MG per tablet Take 1 tablet by mouth daily as needed for Pain for up to 10 days. Max Daily Amount: 1 tablet 11/5/24 11/15/24 Yes Loan Farias APRN - CNP   leflunomide (ARAVA) 20 MG tablet Take 1 tablet by mouth daily 24  Yes ProviderMaría Elena MD   gabapentin (NEURONTIN) 300 MG capsule Take 1 capsule by mouth See Admin Instructions for 30 days. Start QPM x7 days, then can increase to BID x7 days, then can increase to TID. 10/8/24 11/15/24 Yes Loan Farias APRN - CNP   magnesium oxide (MAG-OX) 400 MG tablet Take 1 tablet by mouth daily   Yes ProviderMaría Elena MD   vitamin D (CHOLECALCIFEROL) 25 MCG (1000 UT) TABS tablet Take 1 tablet by mouth daily   Yes Provider, MD María Elena   carvedilol (COREG) 6.25 MG tablet Take 1 tablet by mouth 2 times daily (with meals)   Yes ProviderMaría Elena MD   amLODIPine (NORVASC) 2.5 MG tablet Take 1 tablet by mouth daily   Yes ProviderMaría Elena MD   atorvastatin (LIPITOR) 40 MG tablet Take 1 tablet by mouth daily   Yes ProviderMaría Elena MD   oxyCODONE-acetaminophen (PERCOCET) 5-325 MG per tablet Take 1 tablet by mouth every 6 hours as needed for Pain for up to 7 days. Intended supply: 7 days. Take lowest dose possible to manage pain Max Daily Amount: 4 tablets 11/15/24 11/22/24  Lydia Hilton MD   Handicap Placard MISC by Does not apply route Valid 24- 25   Loan Farias APRN - CNP   latanoprost (XALATAN) 0.005 % ophthalmic solution 
EXCISION  OF RIGHT AXILLARY MASS performed by Anam Rodriguez MD at [facility] [place]   • TONSILLECTOMY         Social History:    Social History     Tobacco Use   • Smoking status: Former     Current packs/day: 0.00     Average packs/day: 0.5 packs/day for 5.0 years (2.5 ttl pk-yrs)     Types: Cigarettes     Start date:      Quit date:      Years since quittin.9   • Smokeless tobacco: Never   Substance Use Topics   • Alcohol use: Yes     Comment: Socially                                Counseling given: Not Answered      Vital Signs (Current):   Vitals:    11/15/24 0557   BP: 137/63   Pulse: 77   Resp: 16   Temp: 98.1 °F (36.7 °C)   TempSrc: Temporal   SpO2: 95%   Weight: 78 kg (172 lb)   Height: 1.651 m (5' 5\")                                              BP Readings from Last 3 Encounters:   11/15/24 137/63   24 (!) 142/82   24 136/76       NPO Status: Time of last liquid consumption:                         Time of last solid consumption:                         Date of last liquid consumption: 24                        Date of last solid food consumption: 24    BMI:   Wt Readings from Last 3 Encounters:   11/15/24 78 kg (172 lb)   24 78 kg (172 lb)   24 80.3 kg (177 lb)     Body mass index is 28.62 kg/m².    CBC:   Lab Results   Component Value Date/Time    WBC 4.2 2016 07:36 AM    RBC 4.16 2016 07:36 AM    HGB 13.7 2016 07:36 AM    HCT 39.4 2016 07:36 AM    MCV 94.8 2016 07:36 AM    RDW 11.7 2016 07:36 AM     2016 07:36 AM       CMP:   Lab Results   Component Value Date/Time     2024 10:04 AM    K 3.7 2024 10:04 AM     2024 10:04 AM    CO2 28 2024 10:04 AM    BUN 8 2024 10:04 AM    CREATININE 0.69 2024 10:04 AM    GFRAA >60.0 2016 07:36 AM    LABGLOM >90.0 2024 10:04 AM    GLUCOSE 97 2024 10:04 AM    CALCIUM 8.9 2024 10:04 AM       POC Tests:

## 2024-11-15 NOTE — INTERVAL H&P NOTE
Update History & Physical    The patient's History and Physical of  was reviewed with the patient and I examined the patient. There was no change. The surgical site was confirmed by the patient and me.     Plan: The risks, benefits, expected outcome, and alternative to the recommended procedure have been discussed with the patient. Patient understands and wants to proceed with the procedure.     Electronically signed by KIAN LUGO MD on 11/15/2024 at 6:51 AM

## 2024-11-15 NOTE — OP NOTE
[facility]                   [address] [place] [address]                            OPERATIVE REPORT      PATIENT NAME: ALONDRA POLLACK           : 1950  MED REC NO: [id number]                        ROOM: [id number]  ACCOUNT NO: [id number]                       ADMIT DATE: 11/15/2024  PROVIDER: Lydia Hilton MD      DATE OF PROCEDURE:  11/15/2024    SURGEON:  Lydia Hilton MD    PREOPERATIVE DIAGNOSIS:  L3, L4, L5 canal stenosis with neurogenic claudication.    POSTOPERATIVE DIAGNOSIS:  L3, L4, L5 canal stenosis with neurogenic claudication.    PROCEDURES PERFORMED:    1. Left bilateral L4, L5, S1 microdissection and decompression.  2. Left L5-S1 diskectomy.    INDICATIONS:  Gait deterioration and weakness, left lower extremity.    DESCRIPTION OF PROCEDURE:  The patient was given general endotracheal anesthesia in supine position, turned to the prone position.  Ancef IV preoperatively.  Back was prepped and draped.  Time-out and patient identified.  Needle was placed.  Lateral x-rays obtained to confirm level.  Needle was withdrawn.  Skin incision was made over the tip of the spinous processes of L4, L5, and S1.  On the right side only, dissection was carried down to the spinous processes, lamina, medial facet joints.  L4, L5, and S1 exposed.  Needle was placed.  Lateral x-rays obtained to confirm level.  Needle was withdrawn.  Micro retractor was placed at L4-L5 on the left side.  With very careful microdissection, decompression, partial hemilaminectomy, inferior aspect of L4, superior L5, small medial facetectomy was performed, removing the hypertrophied ligamentum flavum, performing a foraminotomy for the exit of the L5 nerve around the L5 pedicle.  Microscope angled over the dorsal aspect of the dural sac to the patient's right side, performing a small partial hemilaminectomy inferior aspect of L4, superior aspect of L5, foraminotomy for the L5 nerve root, removing the

## 2024-11-15 NOTE — DISCHARGE INSTR - COC
- No ventilator support    Rehab Therapies: Physical Therapy and Occupational Therapy  Weight Bearing Status/Restrictions: No weight bearing restrictions  Other Medical Equipment (for information only, NOT a DME order):  walker  Other Treatments: none      Patient's personal belongings (please select all that are sent with patient):  Hearing Aides {LEFT/RIGHT/BILATERAL:[id number]}, Dentures {DESC; UPPER/LOWER/BOTH:71366}    RN SIGNATURE:  Electronically signed by Rosibel Jara RN on 11/20/24 at 3:07 PM EST    CASE MANAGEMENT/SOCIAL WORK SECTION    Inpatient Status Date: 11/15/24    Readmission Risk Assessment Score:  Readmission Risk              Risk of Unplanned Readmission:  0           Discharging to Facility/ Agency   Name: [facility]  Address:  Phone:  Fax:    Dialysis Facility (if applicable)   Name:  Address:  Dialysis Schedule:  Phone:  Fax:    / signature: Electronically signed by ABDULKADIR Ugarte on 11/19/24 at 1:57 PM EST    PHYSICIAN SECTION    Prognosis: {Prognosis:5596766584}    Condition at Discharge: { Patient Condition:828007775}    Rehab Potential (if transferring to Rehab): {Prognosis:8892384740}    Recommended Labs or Other Treatments After Discharge: ***    Physician Certification: I certify the above information and transfer of Rosemarie Grant  is necessary for the continuing treatment of the diagnosis listed and that she requires Skilled Nursing Facility for less 30 days.     Update Admission H&P: {CHP DME Changes in HandP:[id number]}    PHYSICIAN SIGNATURE:  Electronically signed by KIAN LUGO MD on 11/15/24 at 6:52 AM EST

## 2024-11-15 NOTE — ANESTHESIA POSTPROCEDURE EVALUATION
Department of Anesthesiology  Postprocedure Note    Patient: Rosemarie Grant  MRN: [id number]  YOB: 1950  Date of evaluation: 11/15/2024    Procedure Summary       Date: 11/15/24 Room / Location: [address]    Anesthesia Start: 0741 Anesthesia Stop: 1048    Procedure: Left and bilateral L4-5-S1 decompression left L5-S1 discectomy. (Left: Spine Lumbar) Diagnosis:       Spinal stenosis of lumbar region with neurogenic claudication      (Spinal stenosis of lumbar region with neurogenic claudication [M48.062])    Surgeons: Lydia Hilton MD Responsible Provider: Zak Kohler MD    Anesthesia Type: general ASA Status: 3            Anesthesia Type: No value filed.    Teresa Phase I: Teresa Score: 8    Teresa Phase II:      Anesthesia Post Evaluation    Patient location during evaluation: bedside  Patient participation: complete - patient participated  Level of consciousness: responsive to light touch  Pain score: 0  Airway patency: patent  Nausea & Vomiting: no nausea and no vomiting  Cardiovascular status: blood pressure returned to baseline and hemodynamically stable  Respiratory status: acceptable, spontaneous ventilation, nonlabored ventilation and face mask  Hydration status: euvolemic  Pain management: adequate        No notable events documented.

## 2024-11-15 NOTE — DISCHARGE INSTRUCTIONS
Discharge prescriptions e-prescribed to pharmacy.  Order done  as outpatient.  Rx Percocet 5/325 #28 [facility] pharmacy    Recheck one month.    Questions call office .

## 2024-11-15 NOTE — BRIEF OP NOTE
Brief Postoperative Note      Patient: Rosemarie Grant  YOB: 1950  MRN: [id number]    Date of Procedure: 11/15/2024    Pre-Op Diagnosis Codes:      * Spinal stenosis of lumbar region with neurogenic claudication [M48.062]    Post-Op Diagnosis: Same       Procedure(s):  Left and bilateral L4-5-S1 decompression left L5-S1 discectomy.    Surgeon(s):  Lydia Hilton MD    Assistant:  Physician Assistant: Carol Gonzalez PA-C    Anesthesia: General    Estimated Blood Loss (mL): Minimal    Complications: None          Drains:   Closed/Suction Drain Medial Back Accordion (Active)       Findings:  Infection Present At Time Of Surgery (PATOS) (choose all levels that have infection present):  No infection present  Other Findings: Stenosis left L5-S1 protruded disc contributing to the stenosis.    Electronically signed by LYDIA HILTON MD on 11/15/2024 at 10:10 AM

## 2024-11-15 NOTE — CONSULTS
74-year-old woman admitted under neurosurgery service consult me for medical management of hypertension hyperlipidemia and arthritis.  Status post left and bilateral L4 L5-S1 decompression with left L5-S1 cystectomy.  Tolerated surgery well denies any    Past Medical History:   Diagnosis Date    Arthritis     Cancer (HCC)     lung cancer    Cerebral artery occlusion with cerebral infarction (HCC)     Hyperlipidemia     Hypertension     Osteoarthritis      Past Surgical History:   Procedure Laterality Date    AXILLARY SURGERY Right 12/21/2016    DR ANAM RODRIGUEZ    BUNIONECTOMY Right     COLONOSCOPY      HYSTERECTOMY (CERVIX STATUS UNKNOWN)      LYMPH NODE BIOPSY Left     2010    LYMPH NODE DISSECTION Right 12/21/2016    EXCISION  OF RIGHT AXILLARY MASS performed [place] Anam [place] [facility] [place]    TONSILLECTOMY       Social History       Tobacco History       Smoking Status  Former Smoking Start Date  1976 Quit Date  1981 Average Packs/Day  0.5 packs/day for 5.0 years (2.5 ttl pk-yrs) Smoking Tobacco Type  Cigarettes from 1976 to 1981   Pack Year History     Packs/Day From To Years    0 1981  43.9    0.5 1976 1981 5.0      Smokeless Tobacco Use  Never              Alcohol History       Alcohol Use Status  Yes Comment  Socially              Drug Use       Drug Use Status  No              Sexual Activity       Sexually Active  Never                  Family History   Problem Relation Age of Onset    Arthritis Sister     Asthma Maternal Cousin      No current facility-administered medications on file prior to encounter.     Current Outpatient Medications on File Prior to Encounter   Medication Sig Dispense Refill    leflunomide (ARAVA) 20 MG tablet Take 1 tablet by mouth daily      gabapentin (NEURONTIN) 300 MG capsule Take 1 capsule by mouth See Admin Instructions for 30 days. Start QPM x7 days, then can increase to BID x7 days, then can increase to TID. 90 capsule 0    magnesium oxide (MAG-OX) 400 MG tablet Take

## 2024-11-16 PROCEDURE — G0378 HOSPITAL OBSERVATION PER HR: HCPCS

## 2024-11-16 PROCEDURE — 96360 HYDRATION IV INFUSION INIT: CPT

## 2024-11-16 PROCEDURE — 2700000000 HC OXYGEN THERAPY PER DAY

## 2024-11-16 PROCEDURE — 6360000002 HC RX W HCPCS: Performed by: NEUROLOGICAL SURGERY

## 2024-11-16 PROCEDURE — 2580000003 HC RX 258: Performed by: NEUROLOGICAL SURGERY

## 2024-11-16 PROCEDURE — 96361 HYDRATE IV INFUSION ADD-ON: CPT

## 2024-11-16 PROCEDURE — 97535 SELF CARE MNGMENT TRAINING: CPT

## 2024-11-16 PROCEDURE — 6370000000 HC RX 637 (ALT 250 FOR IP): Performed by: NURSE PRACTITIONER

## 2024-11-16 PROCEDURE — 99221 1ST HOSP IP/OBS SF/LOW 40: CPT | Performed by: PHYSICAL MEDICINE & REHABILITATION

## 2024-11-16 PROCEDURE — 6370000000 HC RX 637 (ALT 250 FOR IP): Performed by: NEUROLOGICAL SURGERY

## 2024-11-16 RX ORDER — CALCIUM CARBONATE 500 MG/1
500 TABLET, CHEWABLE ORAL 3 TIMES DAILY PRN
Status: DISCONTINUED | OUTPATIENT
Start: 2024-11-16 | End: 2024-11-20 | Stop reason: HOSPADM

## 2024-11-16 RX ADMIN — AMLODIPINE BESYLATE 2.5 MG: 2.5 TABLET ORAL at 08:52

## 2024-11-16 RX ADMIN — ANTACID TABLETS 500 MG: 500 TABLET, CHEWABLE ORAL at 02:04

## 2024-11-16 RX ADMIN — Medication 10 ML: at 08:53

## 2024-11-16 RX ADMIN — Medication 400 MG: at 08:52

## 2024-11-16 RX ADMIN — ACETAMINOPHEN 325MG 650 MG: 325 TABLET ORAL at 00:44

## 2024-11-16 RX ADMIN — OXYCODONE 5 MG: 5 TABLET ORAL at 15:58

## 2024-11-16 RX ADMIN — ANTACID TABLETS 500 MG: 500 TABLET, CHEWABLE ORAL at 20:24

## 2024-11-16 RX ADMIN — Medication 10 ML: at 20:10

## 2024-11-16 RX ADMIN — OXYCODONE 5 MG: 5 TABLET ORAL at 11:48

## 2024-11-16 RX ADMIN — OXYCODONE 5 MG: 5 TABLET ORAL at 06:56

## 2024-11-16 RX ADMIN — Medication 1000 UNITS: at 08:52

## 2024-11-16 RX ADMIN — BISACODYL 5 MG: 5 TABLET, COATED ORAL at 08:52

## 2024-11-16 RX ADMIN — OXYCODONE 5 MG: 5 TABLET ORAL at 22:37

## 2024-11-16 RX ADMIN — CEFAZOLIN 2000 MG: 2 INJECTION, POWDER, FOR SOLUTION INTRAMUSCULAR; INTRAVENOUS at 00:46

## 2024-11-16 RX ADMIN — OXYCODONE 5 MG: 5 TABLET ORAL at 00:45

## 2024-11-16 RX ADMIN — CARVEDILOL 6.25 MG: 12.5 TABLET, FILM COATED ORAL at 17:32

## 2024-11-16 RX ADMIN — ACETAMINOPHEN 325MG 650 MG: 325 TABLET ORAL at 06:56

## 2024-11-16 RX ADMIN — ACETAMINOPHEN 325MG 650 MG: 325 TABLET ORAL at 17:32

## 2024-11-16 RX ADMIN — ATORVASTATIN CALCIUM 40 MG: 40 TABLET, FILM COATED ORAL at 08:52

## 2024-11-16 RX ADMIN — LEFLUNOMIDE 20 MG: 20 TABLET ORAL at 08:52

## 2024-11-16 RX ADMIN — CARVEDILOL 6.25 MG: 12.5 TABLET, FILM COATED ORAL at 08:51

## 2024-11-16 RX ADMIN — GABAPENTIN 300 MG: 300 CAPSULE ORAL at 08:52

## 2024-11-16 RX ADMIN — ACETAMINOPHEN 325MG 650 MG: 325 TABLET ORAL at 11:50

## 2024-11-16 RX ADMIN — ANTACID TABLETS 500 MG: 500 TABLET, CHEWABLE ORAL at 17:31

## 2024-11-16 RX ADMIN — LATANOPROST 1 DROP: 50 SOLUTION OPHTHALMIC at 20:10

## 2024-11-16 ASSESSMENT — PAIN DESCRIPTION - LOCATION
LOCATION: BACK
LOCATION: BACK;INCISION
LOCATION: BACK
LOCATION: INCISION;BACK
LOCATION: BACK

## 2024-11-16 ASSESSMENT — PAIN SCALES - GENERAL
PAINLEVEL_OUTOF10: 0
PAINLEVEL_OUTOF10: 9
PAINLEVEL_OUTOF10: 5
PAINLEVEL_OUTOF10: 7
PAINLEVEL_OUTOF10: 8
PAINLEVEL_OUTOF10: 6
PAINLEVEL_OUTOF10: 2
PAINLEVEL_OUTOF10: 5
PAINLEVEL_OUTOF10: 7
PAINLEVEL_OUTOF10: 7

## 2024-11-16 ASSESSMENT — PAIN DESCRIPTION - DESCRIPTORS
DESCRIPTORS: ACHING
DESCRIPTORS: SHARP
DESCRIPTORS: SHARP;ACHING
DESCRIPTORS: ACHING
DESCRIPTORS: BURNING
DESCRIPTORS: BURNING
DESCRIPTORS: SHARP

## 2024-11-16 ASSESSMENT — PAIN DESCRIPTION - ORIENTATION
ORIENTATION: LOWER;MID
ORIENTATION: LOWER;MID
ORIENTATION: MID;LOWER
ORIENTATION: MID
ORIENTATION: LOWER;MID
ORIENTATION: LOWER;MID
ORIENTATION: MID;LOWER

## 2024-11-16 NOTE — CONSULTS
Physical Medicine & Rehabilitation  Consult Note      Admitting Physician: Lydia Hilton MD    Primary Care Provider: Sonia Snow MD     Reason for Consult:  Asses rehab needs, promote physical and mental function, analyze level of care to determine rehab needs, improve ability to actively participate in the rehabilitation process, and decrease likelihood of re-admit to the hospital after discharge.      History of Present Illness:    Rosemarie Grant is a 74 y.o. female admitted to [facility] on 11/15/2024.      Pt is recovering from:    Date: 11/15/24 Room / Location: [address]     Anesthesia Start: 0741 Anesthesia Stop: 1048     Procedure: Left and bilateral L4-5-S1 decompression left L5-S1 discectomy. (Left: Spine Lumbar) Diagnosis:       Spinal stenosis of lumbar region with neurogenic claudication      (Spinal stenosis of lumbar region with neurogenic claudication [M48.062])     Surgeons: Lydia Hilton MD Responsible Provider: Zak Kohler MD     Anesthesia Type: general ASA Status: 3            Back Pain  This is a chronic problem. The current episode started more than 1 year ago. The problem has been waxing and waning since onset. The pain is present in the gluteal, lumbar spine and sacro-iliac. The quality of the pain is described as aching. The pain is at a severity of 10/10. The pain is severe. Associated symptoms include headaches, numbness and weakness. Pertinent negatives include no fever.   Neurologic Problem  The patient's primary symptoms include an altered mental status, clumsiness, focal sensory loss, focal weakness and weakness. Associated symptoms include back pain, confusion, dizziness, fatigue, headaches, light-headedness and neck pain. Pertinent negatives include no diaphoresis, fever, nausea, palpitations or shortness of breath.         I reviewed recent nursing notes discussed care with acute care providers, \" change and

## 2024-11-16 NOTE — PLAN OF CARE
Problem: Chronic Conditions and Co-morbidities  Goal: Patient's chronic conditions and co-morbidity symptoms are monitored and maintained or improved  11/15/2024 2317 by Joie Kerr RN  Outcome: Progressing  11/15/2024 1705 by Suzanne Shaw RN  Outcome: Progressing     Problem: Discharge Planning  Goal: Discharge to home or other facility with appropriate resources  11/15/2024 2317 by Joie Kerr RN  Outcome: Progressing  11/15/2024 1705 by Suzanne Shaw RN  Outcome: Progressing     Problem: Pain  Goal: Verbalizes/displays adequate comfort level or baseline comfort level  11/15/2024 2317 by Joie Kerr RN  Outcome: Progressing  11/15/2024 1705 by Suzanne Shaw RN  Outcome: Progressing

## 2024-11-17 PROCEDURE — 6370000000 HC RX 637 (ALT 250 FOR IP): Performed by: NURSE PRACTITIONER

## 2024-11-17 PROCEDURE — 6370000000 HC RX 637 (ALT 250 FOR IP): Performed by: NEUROLOGICAL SURGERY

## 2024-11-17 PROCEDURE — G0378 HOSPITAL OBSERVATION PER HR: HCPCS

## 2024-11-17 PROCEDURE — 97116 GAIT TRAINING THERAPY: CPT

## 2024-11-17 PROCEDURE — 2580000003 HC RX 258: Performed by: NEUROLOGICAL SURGERY

## 2024-11-17 PROCEDURE — 99232 SBSQ HOSP IP/OBS MODERATE 35: CPT | Performed by: PHYSICAL MEDICINE & REHABILITATION

## 2024-11-17 RX ADMIN — CARVEDILOL 6.25 MG: 12.5 TABLET, FILM COATED ORAL at 08:55

## 2024-11-17 RX ADMIN — CARVEDILOL 6.25 MG: 12.5 TABLET, FILM COATED ORAL at 17:20

## 2024-11-17 RX ADMIN — Medication 400 MG: at 08:55

## 2024-11-17 RX ADMIN — OXYCODONE 5 MG: 5 TABLET ORAL at 22:23

## 2024-11-17 RX ADMIN — OXYCODONE 5 MG: 5 TABLET ORAL at 13:13

## 2024-11-17 RX ADMIN — ATORVASTATIN CALCIUM 40 MG: 40 TABLET, FILM COATED ORAL at 08:56

## 2024-11-17 RX ADMIN — BISACODYL 5 MG: 5 TABLET, COATED ORAL at 08:56

## 2024-11-17 RX ADMIN — ACETAMINOPHEN 325MG 650 MG: 325 TABLET ORAL at 20:14

## 2024-11-17 RX ADMIN — ANTACID TABLETS 500 MG: 500 TABLET, CHEWABLE ORAL at 08:55

## 2024-11-17 RX ADMIN — OXYCODONE 5 MG: 5 TABLET ORAL at 08:56

## 2024-11-17 RX ADMIN — OXYCODONE 5 MG: 5 TABLET ORAL at 17:19

## 2024-11-17 RX ADMIN — ANTACID TABLETS 500 MG: 500 TABLET, CHEWABLE ORAL at 23:08

## 2024-11-17 RX ADMIN — LATANOPROST 1 DROP: 50 SOLUTION OPHTHALMIC at 20:14

## 2024-11-17 RX ADMIN — ACETAMINOPHEN 325MG 650 MG: 325 TABLET ORAL at 08:55

## 2024-11-17 RX ADMIN — Medication 10 ML: at 20:14

## 2024-11-17 RX ADMIN — LEFLUNOMIDE 20 MG: 20 TABLET ORAL at 09:06

## 2024-11-17 RX ADMIN — ACETAMINOPHEN 325MG 650 MG: 325 TABLET ORAL at 02:53

## 2024-11-17 RX ADMIN — Medication 1000 UNITS: at 08:56

## 2024-11-17 RX ADMIN — Medication 10 ML: at 08:56

## 2024-11-17 RX ADMIN — OXYCODONE 5 MG: 5 TABLET ORAL at 03:13

## 2024-11-17 RX ADMIN — ACETAMINOPHEN 325MG 650 MG: 325 TABLET ORAL at 15:23

## 2024-11-17 RX ADMIN — AMLODIPINE BESYLATE 2.5 MG: 2.5 TABLET ORAL at 08:55

## 2024-11-17 ASSESSMENT — PAIN SCALES - GENERAL
PAINLEVEL_OUTOF10: 8
PAINLEVEL_OUTOF10: 7
PAINLEVEL_OUTOF10: 3
PAINLEVEL_OUTOF10: 8
PAINLEVEL_OUTOF10: 6
PAINLEVEL_OUTOF10: 2
PAINLEVEL_OUTOF10: 2
PAINLEVEL_OUTOF10: 7
PAINLEVEL_OUTOF10: 6
PAINLEVEL_OUTOF10: 8
PAINLEVEL_OUTOF10: 6

## 2024-11-17 ASSESSMENT — PAIN DESCRIPTION - DESCRIPTORS
DESCRIPTORS: SHARP;THROBBING
DESCRIPTORS: ACHING;SHARP
DESCRIPTORS: ACHING
DESCRIPTORS: ACHING;SHARP
DESCRIPTORS: SHARP
DESCRIPTORS: ACHING
DESCRIPTORS: BURNING;ACHING

## 2024-11-17 ASSESSMENT — PAIN DESCRIPTION - LOCATION
LOCATION: BACK

## 2024-11-17 ASSESSMENT — PAIN DESCRIPTION - ORIENTATION
ORIENTATION: MID
ORIENTATION: MID
ORIENTATION: LOWER
ORIENTATION: MID
ORIENTATION: LOWER
ORIENTATION: MID

## 2024-11-18 LAB
ANION GAP SERPL CALCULATED.3IONS-SCNC: 8 MEQ/L (ref 9–15)
BASOPHILS # BLD: 0 K/UL (ref 0–0.2)
BASOPHILS NFR BLD: 0.1 %
BUN SERPL-MCNC: 15 MG/DL (ref 8–23)
CALCIUM SERPL-MCNC: 8.2 MG/DL (ref 8.5–9.9)
CHLORIDE SERPL-SCNC: 103 MEQ/L (ref 95–107)
CO2 SERPL-SCNC: 28 MEQ/L (ref 20–31)
CREAT SERPL-MCNC: 0.61 MG/DL (ref 0.5–0.9)
EOSINOPHIL # BLD: 0 K/UL (ref 0–0.7)
EOSINOPHIL NFR BLD: 0.5 %
ERYTHROCYTE [DISTWIDTH] IN BLOOD BY AUTOMATED COUNT: 11.9 % (ref 11.5–14.5)
GLUCOSE SERPL-MCNC: 88 MG/DL (ref 70–99)
HCT VFR BLD AUTO: 31.8 % (ref 37–47)
HGB BLD-MCNC: 11.1 G/DL (ref 12–16)
LYMPHOCYTES # BLD: 3.5 K/UL (ref 1–4.8)
LYMPHOCYTES NFR BLD: 41.3 %
MAGNESIUM SERPL-MCNC: 1.8 MG/DL (ref 1.7–2.4)
MCH RBC QN AUTO: 31.9 PG (ref 27–31.3)
MCHC RBC AUTO-ENTMCNC: 34.9 % (ref 33–37)
MCV RBC AUTO: 91.4 FL (ref 79.4–94.8)
MONOCYTES # BLD: 1.4 K/UL (ref 0.2–0.8)
MONOCYTES NFR BLD: 16.6 %
NEUTROPHILS # BLD: 3.5 K/UL (ref 1.4–6.5)
NEUTS SEG NFR BLD: 41.3 %
PLATELET # BLD AUTO: 202 K/UL (ref 130–400)
POTASSIUM SERPL-SCNC: 3.4 MEQ/L (ref 3.4–4.9)
RBC # BLD AUTO: 3.48 M/UL (ref 4.2–5.4)
SODIUM SERPL-SCNC: 139 MEQ/L (ref 135–144)
WBC # BLD AUTO: 8.4 K/UL (ref 4.8–10.8)

## 2024-11-18 PROCEDURE — 6370000000 HC RX 637 (ALT 250 FOR IP): Performed by: NURSE PRACTITIONER

## 2024-11-18 PROCEDURE — 83735 ASSAY OF MAGNESIUM: CPT

## 2024-11-18 PROCEDURE — 36415 COLL VENOUS BLD VENIPUNCTURE: CPT

## 2024-11-18 PROCEDURE — 80048 BASIC METABOLIC PNL TOTAL CA: CPT

## 2024-11-18 PROCEDURE — 99232 SBSQ HOSP IP/OBS MODERATE 35: CPT | Performed by: PHYSICAL MEDICINE & REHABILITATION

## 2024-11-18 PROCEDURE — 85025 COMPLETE CBC W/AUTO DIFF WBC: CPT

## 2024-11-18 PROCEDURE — 99024 POSTOP FOLLOW-UP VISIT: CPT

## 2024-11-18 PROCEDURE — 6370000000 HC RX 637 (ALT 250 FOR IP): Performed by: NEUROLOGICAL SURGERY

## 2024-11-18 PROCEDURE — G0378 HOSPITAL OBSERVATION PER HR: HCPCS

## 2024-11-18 PROCEDURE — 97116 GAIT TRAINING THERAPY: CPT

## 2024-11-18 PROCEDURE — 2580000003 HC RX 258: Performed by: NEUROLOGICAL SURGERY

## 2024-11-18 PROCEDURE — 97535 SELF CARE MNGMENT TRAINING: CPT

## 2024-11-18 RX ORDER — ONDANSETRON 2 MG/ML
4 INJECTION INTRAMUSCULAR; INTRAVENOUS EVERY 6 HOURS PRN
Status: CANCELLED | OUTPATIENT
Start: 2024-11-18

## 2024-11-18 RX ORDER — SODIUM CHLORIDE 0.9 % (FLUSH) 0.9 %
5-40 SYRINGE (ML) INJECTION EVERY 12 HOURS SCHEDULED
Status: CANCELLED | OUTPATIENT
Start: 2024-11-18

## 2024-11-18 RX ORDER — AMLODIPINE BESYLATE 2.5 MG/1
2.5 TABLET ORAL DAILY
Status: CANCELLED | OUTPATIENT
Start: 2024-11-19

## 2024-11-18 RX ORDER — SODIUM CHLORIDE 0.9 % (FLUSH) 0.9 %
5-40 SYRINGE (ML) INJECTION PRN
Status: CANCELLED | OUTPATIENT
Start: 2024-11-18

## 2024-11-18 RX ORDER — CALCIUM CARBONATE 500 MG/1
500 TABLET, CHEWABLE ORAL 3 TIMES DAILY PRN
Status: CANCELLED | OUTPATIENT
Start: 2024-11-18

## 2024-11-18 RX ORDER — BISACODYL 5 MG/1
5 TABLET, DELAYED RELEASE ORAL DAILY
Status: CANCELLED | OUTPATIENT
Start: 2024-11-19

## 2024-11-18 RX ORDER — LEFLUNOMIDE 20 MG/1
20 TABLET ORAL DAILY
Status: CANCELLED | OUTPATIENT
Start: 2024-11-19

## 2024-11-18 RX ORDER — SODIUM CHLORIDE 9 MG/ML
INJECTION, SOLUTION INTRAVENOUS PRN
Status: CANCELLED | OUTPATIENT
Start: 2024-11-18

## 2024-11-18 RX ORDER — ACETAMINOPHEN 325 MG/1
650 TABLET ORAL EVERY 6 HOURS SCHEDULED
Status: CANCELLED | OUTPATIENT
Start: 2024-11-18

## 2024-11-18 RX ORDER — POLYETHYLENE GLYCOL 3350 17 G/17G
17 POWDER, FOR SOLUTION ORAL DAILY PRN
Status: CANCELLED | OUTPATIENT
Start: 2024-11-18

## 2024-11-18 RX ORDER — CARVEDILOL 12.5 MG/1
6.25 TABLET ORAL 2 TIMES DAILY WITH MEALS
Status: CANCELLED | OUTPATIENT
Start: 2024-11-18

## 2024-11-18 RX ORDER — LANOLIN ALCOHOL/MO/W.PET/CERES
400 CREAM (GRAM) TOPICAL DAILY
Status: CANCELLED | OUTPATIENT
Start: 2024-11-19

## 2024-11-18 RX ORDER — VITAMIN B COMPLEX
1000 TABLET ORAL DAILY
Status: CANCELLED | OUTPATIENT
Start: 2024-11-19

## 2024-11-18 RX ORDER — SODIUM CHLORIDE 9 MG/ML
INJECTION, SOLUTION INTRAVENOUS CONTINUOUS
Status: CANCELLED | OUTPATIENT
Start: 2024-11-18

## 2024-11-18 RX ORDER — ATORVASTATIN CALCIUM 40 MG/1
40 TABLET, FILM COATED ORAL DAILY
Status: CANCELLED | OUTPATIENT
Start: 2024-11-19

## 2024-11-18 RX ORDER — OXYCODONE HYDROCHLORIDE 5 MG/1
5 TABLET ORAL EVERY 6 HOURS PRN
Status: CANCELLED | OUTPATIENT
Start: 2024-11-18

## 2024-11-18 RX ORDER — LATANOPROST 50 UG/ML
1 SOLUTION/ DROPS OPHTHALMIC NIGHTLY
Status: CANCELLED | OUTPATIENT
Start: 2024-11-18

## 2024-11-18 RX ORDER — OXYCODONE HYDROCHLORIDE 5 MG/1
5 TABLET ORAL EVERY 4 HOURS PRN
Status: CANCELLED | OUTPATIENT
Start: 2024-11-18

## 2024-11-18 RX ADMIN — AMLODIPINE BESYLATE 2.5 MG: 2.5 TABLET ORAL at 09:55

## 2024-11-18 RX ADMIN — CARVEDILOL 6.25 MG: 12.5 TABLET, FILM COATED ORAL at 18:19

## 2024-11-18 RX ADMIN — ACETAMINOPHEN 325MG 650 MG: 325 TABLET ORAL at 05:26

## 2024-11-18 RX ADMIN — ACETAMINOPHEN 325MG 650 MG: 325 TABLET ORAL at 00:57

## 2024-11-18 RX ADMIN — ACETAMINOPHEN 325MG 650 MG: 325 TABLET ORAL at 20:33

## 2024-11-18 RX ADMIN — ANTACID TABLETS 500 MG: 500 TABLET, CHEWABLE ORAL at 10:04

## 2024-11-18 RX ADMIN — LATANOPROST 1 DROP: 50 SOLUTION OPHTHALMIC at 20:36

## 2024-11-18 RX ADMIN — OXYCODONE 5 MG: 5 TABLET ORAL at 20:34

## 2024-11-18 RX ADMIN — Medication 400 MG: at 09:53

## 2024-11-18 RX ADMIN — OXYCODONE 5 MG: 5 TABLET ORAL at 06:27

## 2024-11-18 RX ADMIN — LEFLUNOMIDE 20 MG: 20 TABLET ORAL at 12:17

## 2024-11-18 RX ADMIN — CARVEDILOL 6.25 MG: 12.5 TABLET, FILM COATED ORAL at 09:54

## 2024-11-18 RX ADMIN — OXYCODONE 5 MG: 5 TABLET ORAL at 02:16

## 2024-11-18 RX ADMIN — Medication 1000 UNITS: at 09:54

## 2024-11-18 RX ADMIN — BISACODYL 5 MG: 5 TABLET, COATED ORAL at 12:17

## 2024-11-18 RX ADMIN — OXYCODONE 5 MG: 5 TABLET ORAL at 15:38

## 2024-11-18 RX ADMIN — ATORVASTATIN CALCIUM 40 MG: 40 TABLET, FILM COATED ORAL at 09:54

## 2024-11-18 RX ADMIN — Medication 10 ML: at 20:42

## 2024-11-18 RX ADMIN — OXYCODONE 5 MG: 5 TABLET ORAL at 11:02

## 2024-11-18 RX ADMIN — Medication 10 ML: at 11:04

## 2024-11-18 RX ADMIN — ACETAMINOPHEN 325MG 650 MG: 325 TABLET ORAL at 15:37

## 2024-11-18 ASSESSMENT — PAIN DESCRIPTION - DESCRIPTORS
DESCRIPTORS: ACHING;BURNING
DESCRIPTORS: BURNING;ACHING
DESCRIPTORS: SHARP
DESCRIPTORS: ACHING;SHARP
DESCRIPTORS: ACHING;BURNING
DESCRIPTORS: ACHING;SHARP
DESCRIPTORS: ACHING;BURNING
DESCRIPTORS: ACHING;BURNING

## 2024-11-18 ASSESSMENT — PAIN DESCRIPTION - ORIENTATION
ORIENTATION: LEFT;RIGHT
ORIENTATION: RIGHT;LEFT;MID;LOWER
ORIENTATION: LEFT;RIGHT;MID;LOWER
ORIENTATION: RIGHT;LEFT;MID;LOWER
ORIENTATION: RIGHT;LEFT;MID
ORIENTATION: RIGHT;LEFT;LOWER;MID
ORIENTATION: LOWER
ORIENTATION: RIGHT;LEFT;LOWER;MID
ORIENTATION: LOWER;RIGHT;LEFT

## 2024-11-18 ASSESSMENT — PAIN SCALES - GENERAL
PAINLEVEL_OUTOF10: 6
PAINLEVEL_OUTOF10: 7
PAINLEVEL_OUTOF10: 6
PAINLEVEL_OUTOF10: 7
PAINLEVEL_OUTOF10: 5

## 2024-11-18 ASSESSMENT — PAIN DESCRIPTION - LOCATION
LOCATION: BACK;LEG;BUTTOCKS
LOCATION: BUTTOCKS;BACK;LEG
LOCATION: BACK;HIP
LOCATION: BACK;BUTTOCKS;LEG
LOCATION: BACK
LOCATION: BACK;BUTTOCKS;LEG
LOCATION: BACK;BUTTOCKS;LEG
LOCATION: BUTTOCKS;BACK;LEG
LOCATION: BACK;BUTTOCKS;LEG

## 2024-11-18 NOTE — PLAN OF CARE
Problem: Chronic Conditions and Co-morbidities  Goal: Patient's chronic conditions and co-morbidity symptoms are monitored and maintained or improved  11/18/2024 1131 by Diana Penaloza RN  Outcome: Progressing  11/18/2024 0340 by Mike Ontiveros RN  Outcome: Progressing     Problem: Discharge Planning  Goal: Discharge to home or other facility with appropriate resources  11/18/2024 1131 by Diana Penaloza RN  Outcome: Progressing  11/18/2024 0340 by Mike Ontiveros RN  Outcome: Progressing     Problem: Pain  Goal: Verbalizes/displays adequate comfort level or baseline comfort level  11/18/2024 1131 by Diana Penaloza RN  Outcome: Progressing  11/18/2024 0340 by Mike Ontiveros RN  Outcome: Progressing     Problem: Safety - Adult  Goal: Free from fall injury  11/18/2024 1131 by Diana Penaloza RN  Outcome: Progressing  11/18/2024 0340 by Mike Ontiveros RN  Outcome: Progressing

## 2024-11-19 ENCOUNTER — APPOINTMENT (OUTPATIENT)
Dept: GENERAL RADIOLOGY | Age: 74
End: 2024-11-19
Attending: NEUROLOGICAL SURGERY
Payer: MEDICARE

## 2024-11-19 DIAGNOSIS — T14.8XXA WOUND DRAINAGE: Primary | ICD-10-CM

## 2024-11-19 LAB
BILIRUB UR QL STRIP: NEGATIVE
CLARITY UR: CLEAR
COLOR UR: YELLOW
GLUCOSE UR STRIP-MCNC: NEGATIVE MG/DL
HGB UR QL STRIP: NEGATIVE
KETONES UR STRIP-MCNC: NEGATIVE MG/DL
LEUKOCYTE ESTERASE UR QL STRIP: NEGATIVE
NITRITE UR QL STRIP: NEGATIVE
PH UR STRIP: 6.5 [PH] (ref 5–9)
PROCALCITONIN SERPL IA-MCNC: 0.13 NG/ML (ref 0–0.15)
PROT UR STRIP-MCNC: ABNORMAL MG/DL
SP GR UR STRIP: 1.02 (ref 1–1.03)
URINE REFLEX TO CULTURE: ABNORMAL
UROBILINOGEN UR STRIP-ACNC: 2 E.U./DL

## 2024-11-19 PROCEDURE — 6370000000 HC RX 637 (ALT 250 FOR IP): Performed by: NEUROLOGICAL SURGERY

## 2024-11-19 PROCEDURE — G0378 HOSPITAL OBSERVATION PER HR: HCPCS

## 2024-11-19 PROCEDURE — 2580000003 HC RX 258: Performed by: NEUROLOGICAL SURGERY

## 2024-11-19 PROCEDURE — 99024 POSTOP FOLLOW-UP VISIT: CPT

## 2024-11-19 PROCEDURE — 84145 PROCALCITONIN (PCT): CPT

## 2024-11-19 PROCEDURE — 99232 SBSQ HOSP IP/OBS MODERATE 35: CPT | Performed by: PHYSICAL MEDICINE & REHABILITATION

## 2024-11-19 PROCEDURE — 6370000000 HC RX 637 (ALT 250 FOR IP)

## 2024-11-19 PROCEDURE — 36415 COLL VENOUS BLD VENIPUNCTURE: CPT

## 2024-11-19 PROCEDURE — 71045 X-RAY EXAM CHEST 1 VIEW: CPT

## 2024-11-19 PROCEDURE — 6370000000 HC RX 637 (ALT 250 FOR IP): Performed by: NURSE PRACTITIONER

## 2024-11-19 PROCEDURE — 81003 URINALYSIS AUTO W/O SCOPE: CPT

## 2024-11-19 PROCEDURE — 97116 GAIT TRAINING THERAPY: CPT

## 2024-11-19 PROCEDURE — 87040 BLOOD CULTURE FOR BACTERIA: CPT

## 2024-11-19 PROCEDURE — 97535 SELF CARE MNGMENT TRAINING: CPT

## 2024-11-19 RX ORDER — CEPHALEXIN 500 MG/1
500 CAPSULE ORAL 3 TIMES DAILY
Qty: 21 CAPSULE | Refills: 0 | Status: ON HOLD | OUTPATIENT
Start: 2024-11-19 | End: 2024-11-20

## 2024-11-19 RX ADMIN — OXYCODONE 5 MG: 5 TABLET ORAL at 00:35

## 2024-11-19 RX ADMIN — Medication 3 MG: at 00:34

## 2024-11-19 RX ADMIN — ACETAMINOPHEN 325MG 650 MG: 325 TABLET ORAL at 22:39

## 2024-11-19 RX ADMIN — ACETAMINOPHEN 325MG 650 MG: 325 TABLET ORAL at 00:34

## 2024-11-19 RX ADMIN — OXYCODONE 5 MG: 5 TABLET ORAL at 17:10

## 2024-11-19 RX ADMIN — CARVEDILOL 6.25 MG: 12.5 TABLET, FILM COATED ORAL at 10:30

## 2024-11-19 RX ADMIN — LEFLUNOMIDE 20 MG: 20 TABLET ORAL at 10:31

## 2024-11-19 RX ADMIN — Medication 400 MG: at 10:28

## 2024-11-19 RX ADMIN — OXYCODONE 5 MG: 5 TABLET ORAL at 05:03

## 2024-11-19 RX ADMIN — OXYCODONE 5 MG: 5 TABLET ORAL at 20:52

## 2024-11-19 RX ADMIN — CARVEDILOL 6.25 MG: 12.5 TABLET, FILM COATED ORAL at 17:12

## 2024-11-19 RX ADMIN — ACETAMINOPHEN 325MG 650 MG: 325 TABLET ORAL at 17:09

## 2024-11-19 RX ADMIN — AMLODIPINE BESYLATE 2.5 MG: 2.5 TABLET ORAL at 10:30

## 2024-11-19 RX ADMIN — Medication 10 ML: at 10:45

## 2024-11-19 RX ADMIN — Medication 10 ML: at 20:52

## 2024-11-19 RX ADMIN — ATORVASTATIN CALCIUM 40 MG: 40 TABLET, FILM COATED ORAL at 10:30

## 2024-11-19 RX ADMIN — ANTACID TABLETS 500 MG: 500 TABLET, CHEWABLE ORAL at 20:52

## 2024-11-19 RX ADMIN — OXYCODONE 5 MG: 5 TABLET ORAL at 10:29

## 2024-11-19 RX ADMIN — ACETAMINOPHEN 325MG 650 MG: 325 TABLET ORAL at 05:02

## 2024-11-19 RX ADMIN — Medication 1000 UNITS: at 10:30

## 2024-11-19 ASSESSMENT — PAIN SCALES - GENERAL
PAINLEVEL_OUTOF10: 7
PAINLEVEL_OUTOF10: 6
PAINLEVEL_OUTOF10: 7
PAINLEVEL_OUTOF10: 7
PAINLEVEL_OUTOF10: 6
PAINLEVEL_OUTOF10: 6

## 2024-11-19 ASSESSMENT — PAIN DESCRIPTION - ORIENTATION
ORIENTATION: LOWER;MID
ORIENTATION: RIGHT;LEFT;LOWER;MID
ORIENTATION: LOWER
ORIENTATION: RIGHT;LEFT;MID;LOWER
ORIENTATION: LOWER
ORIENTATION: LOWER
ORIENTATION: LOWER;MID
ORIENTATION: MID;LOWER

## 2024-11-19 ASSESSMENT — PAIN DESCRIPTION - DESCRIPTORS
DESCRIPTORS: SHARP
DESCRIPTORS: ACHING;STABBING
DESCRIPTORS: ACHING
DESCRIPTORS: SORE;BURNING
DESCRIPTORS: ACHING;SHARP
DESCRIPTORS: ACHING
DESCRIPTORS: ACHING;SHARP
DESCRIPTORS: SORE;BURNING

## 2024-11-19 ASSESSMENT — PAIN DESCRIPTION - LOCATION
LOCATION: BACK
LOCATION: BUTTOCKS;BACK
LOCATION: BACK
LOCATION: BACK
LOCATION: BACK;BUTTOCKS;LEG
LOCATION: BACK
LOCATION: BACK
LOCATION: BACK;HIP;LEG

## 2024-11-19 NOTE — PROGRESS NOTES
Keflex 500 mg TID x 7 days ordered for outpatient for infection prophylaxis. Discussed with Dr. Hilton.

## 2024-11-20 ENCOUNTER — HOSPITAL ENCOUNTER (INPATIENT)
Age: 74
LOS: 7 days | Discharge: HOME | DRG: 561 | End: 2024-11-27
Attending: INTERNAL MEDICINE | Admitting: INTERNAL MEDICINE
Payer: MEDICARE

## 2024-11-20 VITALS
BODY MASS INDEX: 28.66 KG/M2 | RESPIRATION RATE: 16 BRPM | HEART RATE: 88 BPM | HEIGHT: 65 IN | SYSTOLIC BLOOD PRESSURE: 138 MMHG | OXYGEN SATURATION: 95 % | DIASTOLIC BLOOD PRESSURE: 67 MMHG | TEMPERATURE: 98.8 F | WEIGHT: 172 LBS

## 2024-11-20 DIAGNOSIS — T14.8XXA WOUND DRAINAGE: ICD-10-CM

## 2024-11-20 DIAGNOSIS — Z98.890 STATUS POST LUMBAR DISCECTOMY: Primary | ICD-10-CM

## 2024-11-20 PROCEDURE — G0378 HOSPITAL OBSERVATION PER HR: HCPCS

## 2024-11-20 PROCEDURE — 2580000003 HC RX 258: Performed by: NEUROLOGICAL SURGERY

## 2024-11-20 PROCEDURE — 97116 GAIT TRAINING THERAPY: CPT

## 2024-11-20 PROCEDURE — 99024 POSTOP FOLLOW-UP VISIT: CPT

## 2024-11-20 PROCEDURE — 6370000000 HC RX 637 (ALT 250 FOR IP): Performed by: NEUROLOGICAL SURGERY

## 2024-11-20 PROCEDURE — 97535 SELF CARE MNGMENT TRAINING: CPT

## 2024-11-20 PROCEDURE — 1200000002 HC SEMI PRIVATE SWING BED

## 2024-11-20 PROCEDURE — 6370000000 HC RX 637 (ALT 250 FOR IP)

## 2024-11-20 RX ORDER — SODIUM CHLORIDE 9 MG/ML
INJECTION, SOLUTION INTRAVENOUS CONTINUOUS
Status: DISCONTINUED | OUTPATIENT
Start: 2024-11-20 | End: 2024-11-20

## 2024-11-20 RX ORDER — AMLODIPINE BESYLATE 2.5 MG/1
2.5 TABLET ORAL DAILY
Status: DISCONTINUED | OUTPATIENT
Start: 2024-11-21 | End: 2024-11-27 | Stop reason: HOSPADM

## 2024-11-20 RX ORDER — CEPHALEXIN 500 MG/1
500 CAPSULE ORAL 3 TIMES DAILY
Status: CANCELLED | OUTPATIENT
Start: 2024-11-20

## 2024-11-20 RX ORDER — OXYCODONE HYDROCHLORIDE 5 MG/1
5 TABLET ORAL EVERY 6 HOURS PRN
Status: DISCONTINUED | OUTPATIENT
Start: 2024-11-20 | End: 2024-11-21

## 2024-11-20 RX ORDER — ACETAMINOPHEN 325 MG/1
650 TABLET ORAL EVERY 6 HOURS SCHEDULED
Status: DISCONTINUED | OUTPATIENT
Start: 2024-11-20 | End: 2024-11-27 | Stop reason: HOSPADM

## 2024-11-20 RX ORDER — ATORVASTATIN CALCIUM 40 MG/1
40 TABLET, FILM COATED ORAL DAILY
Status: DISCONTINUED | OUTPATIENT
Start: 2024-11-21 | End: 2024-11-27 | Stop reason: HOSPADM

## 2024-11-20 RX ORDER — LANOLIN ALCOHOL/MO/W.PET/CERES
400 CREAM (GRAM) TOPICAL DAILY
Status: DISCONTINUED | OUTPATIENT
Start: 2024-11-21 | End: 2024-11-27 | Stop reason: HOSPADM

## 2024-11-20 RX ORDER — HYDROMORPHONE HYDROCHLORIDE 1 MG/ML
0.5 INJECTION, SOLUTION INTRAMUSCULAR; INTRAVENOUS; SUBCUTANEOUS
Status: DISCONTINUED | OUTPATIENT
Start: 2024-11-20 | End: 2024-11-21

## 2024-11-20 RX ORDER — SODIUM CHLORIDE 0.9 % (FLUSH) 0.9 %
5-40 SYRINGE (ML) INJECTION PRN
Status: DISCONTINUED | OUTPATIENT
Start: 2024-11-20 | End: 2024-11-27 | Stop reason: HOSPADM

## 2024-11-20 RX ORDER — HYDROMORPHONE HYDROCHLORIDE 1 MG/ML
0.25 INJECTION, SOLUTION INTRAMUSCULAR; INTRAVENOUS; SUBCUTANEOUS
Status: DISCONTINUED | OUTPATIENT
Start: 2024-11-20 | End: 2024-11-21

## 2024-11-20 RX ORDER — BISACODYL 5 MG/1
5 TABLET, DELAYED RELEASE ORAL DAILY
Status: DISCONTINUED | OUTPATIENT
Start: 2024-11-21 | End: 2024-11-27 | Stop reason: HOSPADM

## 2024-11-20 RX ORDER — ACETAMINOPHEN 325 MG/1
650 TABLET ORAL EVERY 6 HOURS SCHEDULED
Status: CANCELLED | OUTPATIENT
Start: 2024-11-20

## 2024-11-20 RX ORDER — CEPHALEXIN 500 MG/1
500 CAPSULE ORAL 3 TIMES DAILY
Status: DISCONTINUED | OUTPATIENT
Start: 2024-11-20 | End: 2024-11-20 | Stop reason: HOSPADM

## 2024-11-20 RX ORDER — LATANOPROST 50 UG/ML
1 SOLUTION/ DROPS OPHTHALMIC NIGHTLY
Status: DISCONTINUED | OUTPATIENT
Start: 2024-11-20 | End: 2024-11-27 | Stop reason: HOSPADM

## 2024-11-20 RX ORDER — OXYCODONE HYDROCHLORIDE 5 MG/1
5 TABLET ORAL EVERY 4 HOURS PRN
Status: DISCONTINUED | OUTPATIENT
Start: 2024-11-20 | End: 2024-11-21

## 2024-11-20 RX ORDER — POLYETHYLENE GLYCOL 3350 17 G/17G
17 POWDER, FOR SOLUTION ORAL DAILY PRN
Status: DISCONTINUED | OUTPATIENT
Start: 2024-11-20 | End: 2024-11-27 | Stop reason: HOSPADM

## 2024-11-20 RX ORDER — VITAMIN B COMPLEX
1000 TABLET ORAL DAILY
Status: DISCONTINUED | OUTPATIENT
Start: 2024-11-21 | End: 2024-11-27 | Stop reason: HOSPADM

## 2024-11-20 RX ORDER — CALCIUM CARBONATE 500 MG/1
500 TABLET, CHEWABLE ORAL 3 TIMES DAILY PRN
Status: DISCONTINUED | OUTPATIENT
Start: 2024-11-20 | End: 2024-11-27 | Stop reason: HOSPADM

## 2024-11-20 RX ORDER — CEPHALEXIN 500 MG/1
500 CAPSULE ORAL 3 TIMES DAILY
Status: DISCONTINUED | OUTPATIENT
Start: 2024-11-20 | End: 2024-11-27 | Stop reason: HOSPADM

## 2024-11-20 RX ORDER — CARVEDILOL 3.12 MG/1
6.25 TABLET ORAL 2 TIMES DAILY WITH MEALS
Status: DISCONTINUED | OUTPATIENT
Start: 2024-11-21 | End: 2024-11-27 | Stop reason: HOSPADM

## 2024-11-20 RX ORDER — ONDANSETRON 2 MG/ML
4 INJECTION INTRAMUSCULAR; INTRAVENOUS EVERY 6 HOURS PRN
Status: DISCONTINUED | OUTPATIENT
Start: 2024-11-20 | End: 2024-11-27 | Stop reason: HOSPADM

## 2024-11-20 RX ORDER — SODIUM CHLORIDE 0.9 % (FLUSH) 0.9 %
5-40 SYRINGE (ML) INJECTION EVERY 12 HOURS SCHEDULED
Status: DISCONTINUED | OUTPATIENT
Start: 2024-11-20 | End: 2024-11-23

## 2024-11-20 RX ORDER — SODIUM CHLORIDE 9 MG/ML
INJECTION, SOLUTION INTRAVENOUS PRN
Status: DISCONTINUED | OUTPATIENT
Start: 2024-11-20 | End: 2024-11-27 | Stop reason: HOSPADM

## 2024-11-20 RX ORDER — LEFLUNOMIDE 10 MG/1
20 TABLET ORAL DAILY
Status: DISCONTINUED | OUTPATIENT
Start: 2024-11-21 | End: 2024-11-27 | Stop reason: HOSPADM

## 2024-11-20 RX ADMIN — AMLODIPINE BESYLATE 2.5 MG: 2.5 TABLET ORAL at 08:42

## 2024-11-20 RX ADMIN — Medication 1000 UNITS: at 08:42

## 2024-11-20 RX ADMIN — CEPHALEXIN 500 MG: 500 CAPSULE ORAL at 08:42

## 2024-11-20 RX ADMIN — LEFLUNOMIDE 20 MG: 20 TABLET ORAL at 10:18

## 2024-11-20 RX ADMIN — OXYCODONE 5 MG: 5 TABLET ORAL at 06:10

## 2024-11-20 RX ADMIN — Medication 10 ML: at 08:42

## 2024-11-20 RX ADMIN — ACETAMINOPHEN 650 MG: 325 TABLET ORAL at 21:31

## 2024-11-20 RX ADMIN — LATANOPROST 1 DROP: 50 SOLUTION OPHTHALMIC at 21:32

## 2024-11-20 RX ADMIN — CEPHALEXIN 500 MG: 500 CAPSULE ORAL at 14:08

## 2024-11-20 RX ADMIN — ATORVASTATIN CALCIUM 40 MG: 40 TABLET, FILM COATED ORAL at 08:42

## 2024-11-20 RX ADMIN — CARVEDILOL 6.25 MG: 12.5 TABLET, FILM COATED ORAL at 08:42

## 2024-11-20 RX ADMIN — OXYCODONE 5 MG: 5 TABLET ORAL at 14:09

## 2024-11-20 RX ADMIN — CEPHALEXIN 500 MG: 500 CAPSULE ORAL at 21:31

## 2024-11-20 RX ADMIN — BISACODYL 5 MG: 5 TABLET, COATED ORAL at 08:42

## 2024-11-20 RX ADMIN — OXYCODONE 5 MG: 5 TABLET ORAL at 00:54

## 2024-11-20 RX ADMIN — ACETAMINOPHEN 325MG 650 MG: 325 TABLET ORAL at 12:05

## 2024-11-20 RX ADMIN — Medication 400 MG: at 08:42

## 2024-11-20 RX ADMIN — OXYCODONE 5 MG: 5 TABLET ORAL at 18:52

## 2024-11-20 ASSESSMENT — PAIN DESCRIPTION - DESCRIPTORS
DESCRIPTORS: ACHING
DESCRIPTORS: SHARP
DESCRIPTORS: ACHING;THROBBING
DESCRIPTORS: SORE
DESCRIPTORS: SHARP
DESCRIPTORS: DULL
DESCRIPTORS: SHARP

## 2024-11-20 ASSESSMENT — PAIN DESCRIPTION - LOCATION
LOCATION: BACK
LOCATION: BACK;HIP
LOCATION: BACK

## 2024-11-20 ASSESSMENT — PAIN SCALES - GENERAL
PAINLEVEL_OUTOF10: 4
PAINLEVEL_OUTOF10: 3
PAINLEVEL_OUTOF10: 7
PAINLEVEL_OUTOF10: 6
PAINLEVEL_OUTOF10: 7
PAINLEVEL_OUTOF10: 6
PAINLEVEL_OUTOF10: 3
PAINLEVEL_OUTOF10: 7
PAINLEVEL_OUTOF10: 6
PAINLEVEL_OUTOF10: 6
PAINLEVEL_OUTOF10: 7
PAINLEVEL_OUTOF10: 6
PAINLEVEL_OUTOF10: 6

## 2024-11-20 ASSESSMENT — PAIN DESCRIPTION - ORIENTATION
ORIENTATION: MID;LOWER
ORIENTATION: LOWER;MID
ORIENTATION: LOWER
ORIENTATION: MID;LOWER
ORIENTATION: MID
ORIENTATION: MID;LOWER
ORIENTATION: MID;LOWER
ORIENTATION: LOWER

## 2024-11-20 ASSESSMENT — PAIN - FUNCTIONAL ASSESSMENT: PAIN_FUNCTIONAL_ASSESSMENT: PREVENTS OR INTERFERES SOME ACTIVE ACTIVITIES AND ADLS

## 2024-11-20 NOTE — DISCHARGE SUMMARY
Discharge Summary    Rosemarie Grant  :  1950  MRN:  [id number]    ADMIT DATE:  11/15/2024  DISCHARGE DATE:  2024    PRIMARY CARE PHYSICIAN:  Sonia Snow MD    VISIT STATUS: Admission    CODE STATUS:  Full Code    DISCHARGE DIAGNOSES:  Principal Problem:    Spinal stenosis of lumbar region with neurogenic claudication  Active Problems:    Cervical spondylosis without myelopathy    Essential (primary) hypertension    Rheumatoid arthritis, unspecified (HCC)    Lumbar stenosis with neurogenic claudication    Low back pain    Rheumatoid arthritis (HCC)    Impaired mobility ADLs dt RA flare    Post-op pain  Resolved Problems:    * No resolved hospital problems. *      SURGICAL PROCEDURES:  1. Left bilateral L4, L5, S1 microdissection and decompression.  2. Left L5-S1 diskectomy.    HOSPITAL COURSE:  The patient was admitted to the hospital on the day of surgery and underwent the above noted procedure. Post-operatively, the patient was transferred to the PACU in stable condition and then to [facility]. They received 24 hours of prophylactic intravenous antibiotics. DVT prophylaxis included SCDs and early ambulation. Diet was advanced, patient was ambulated and able to urinate, and pain was reasonably controlled. The patient progressed well throughout the hospitalization and was deemed stable for discharge to SNF on the above listed date.     CONSULTANTS:  Hospitalist    DISCHARGE MEDICATIONS:         Medication List        CONTINUE taking these medications      Handicap Placard Misc  by Does not apply route Valid 24- 25            ASK your doctor about these medications      amLODIPine 2.5 MG tablet  Commonly known as: NORVASC     atorvastatin 40 MG tablet  Commonly known as: LIPITOR     carvedilol 6.25 MG tablet  Commonly known as: COREG     cephALEXin 500 MG capsule  Commonly known as: KEFLEX  Take 1 capsule by mouth 3 times daily for 7 days     gabapentin 300 MG capsule  Commonly known as:

## 2024-11-20 NOTE — CARE COORDINATION
Benefits obtained pre cert started for skilled nursing admission. Awaiting insurance approval. CM to follow.  
Case Management Assessment  Initial Evaluation    Date/Time of Evaluation: 11/15/2024 12:26 PM  Assessment Completed by: Kathryn Horta RN    If patient is discharged prior to next notation, then this note serves as note for discharge by case management.    Patient Name: Rosemarie Grant                   YOB: 1950  Diagnosis: Spinal stenosis of lumbar region with neurogenic claudication [M48.062]  Lumbar stenosis with neurogenic claudication [M48.062]                   Date / Time: 11/15/2024  5:42 AM    Patient Admission Status: Observation   Readmission Risk (Low < 19, Mod (19-27), High > 27): No data recorded  Current PCP: Sonia Snow MD  PCP verified by CM? Yes    Chart Reviewed: Yes      History Provided by: Patient  Patient Orientation: Alert and Oriented, Person, Place, Situation, Self    Patient Cognition: Alert    Hospitalization in the last 30 days (Readmission):  No    If yes, Readmission Assessment in  Navigator will be completed.    Advance Directives:      Code Status: Full Code   Patient's Primary Decision Maker is: Legal Next of Kin      Discharge Planning:    Patient lives with:   Type of Home:    Primary Care Giver: Self  Patient Support Systems include: Children   Current Financial resources:    Current community resources:    Current services prior to admission:              Current DME:              Type of Home Care services:       ADLS  Prior functional level: Independent in ADLs/IADLs  Current functional level: Independent in ADLs/IADLs    PT AM-PAC:   /24  OT AM-PAC:   /24    Family can provide assistance at DC: Yes  Would you like Case Management to discuss the discharge plan with any other family members/significant others, and if so, who? Yes (daughter Sylvester)  Plans to Return to Present Housing: Yes  Other Identified Issues/Barriers to RETURNING to current housing: no  Potential Assistance needed at discharge:              Potential DME:    Patient expects to 
DC PLAN REMAINS [facility] REHAB PENDING PRECERT.   
Inpatient Rehab referral received. Met with patient and family at bedside. Patient reports to being pretty functional at baseline. She is not sure what her plans are at this time for d/c as she has not been evaluated by therapy. She is aware rehab team will follow her progress and was advised if needing rehab prior authorization from insurance would have to occur as well. Awaiting PT/OT evals. Patient open to d/c options. Will follow back up with patient if therapy needs noted on evals.  Electronically signed by Bronwyn Ramírez on 11/15/2024 at 2:42 PM    
P2P completed the determination is that the patient can have their needs met at lower LOC.  Electronically signed by Krystal Adams RN on 11/19/24 at 11:18 AM EST   
Prior authorization for Inpatient Rehab Facility (IRF) has been initiated via phone and clinicals successfully faxed for REF# [id number]. Will continue to monitor for response. Electronically signed by Ron Ambrocio RN on 11/17/24 at 10:45 AM EST    
Pt medically cleared for discharge, precert pending for [facility].   
RECEIVED CALL FROM [facility] OF [facility]. P2P WAS DENIED- NEEDS CAN BE MET AT LOWER LEVEL.   MET WITH PT, NOTIFIED OF INSURANCE DETERMINATION. FOC IS OFFERED, PT WOULD LIKE [facility]. REFERRAL SENT.   WILL NEED AUTH. IF ACCEPTED.   
Received a message from [facility] on behalf of [facility] They are offering a P2P discussion prior to their medical director making a final decision for request for acute inpatient rehab.  This offer expires by 12 pm 11/19/2024.  Member ID [id number]  [phone] option 5. Discussed with PM&R.  Electronically signed by Krystal Adams RN on 11/19/24 at 8:16 AM EST     
Referral was called to Yovani at [facility] and she will review.  Precert approval will be needed.    PER YOVANI THE PRECERT HAS BEEN STARTED  
SPOKE WITH [facility], AWAITING ACCEPTANCE, PT WILL NEED PRECERT ONCE ACCEPTED  
SPOKE WITH [facility] ACCEPTED PT AND AWAITING PRECERT.    
Spoke to patient regarding [facility] Rehab Facility (IRF). Discussed IRF mission, goals, daily process, 3 hours of intensive therapy per day, insurance, discharge planning with goal of home with home health care. Discussed her need for rehab due to living alone and unsteady. Reiterated that insurance wqould need to approve admit to IRF prior to admit. Questions asked and understanding verbalized when answered. Asked patient if she would like to admit to IRF if approved by insurance company and she stated, \"Yes, I need rehab\".  Using [place] of choice, the patient chose [facility] Facility if approved by insurance company. Electronically signed by Ron Ambrocio RN on 11/16/24 at 3:45 PM EST    
WE HAVE PRECERT APPROVAL FOR THE PT TO TRANSFER TO [facility] TODAY.  PT REQUESTED [facility] FOR   
1526)  Stand to Sit: Stand by assistance(11/15/24 1526)  Comment: VC for safe hand placement(11/15/24 1526)    Sit to Stand: Stand by assistance(11/16/2024 1001)  Stand to Sit: Stand by assistance(11/16/2024 1001)  Bed to Chair: Stand by assistance(11/16/2024 1001)  Comment: VC for safe hand placement and improved body mechanics, increased time and effort.(11/16/2024 1001)    Gait:   Ambulation  Surface: Level tile (11/17/24 1003)  Device: Rolling Walker (11/17/24 1003)  Assistance: Minimal assistance (11/17/24 1003)  Quality of Gait: Guarded, decreased step length and kirk (11/17/24 1003)  Gait Deviations: Decreased step length;Decreased step height;Slow Kirk (11/16/24 0959)  Distance: 30ft (11/17/24 1003)  Comments: pt unable to tolerate further d/t pain and fatigue. (11/16/24 0959)Ambulation (11/15/24 1526)  Surface: Level tile(11/15/24 1526)  Device: Rolling Walker(11/15/24 1526)  Comments: attempts wt shifting with Min A- limited d/t pain, unable to progress ambulation this date(11/15/24 1526)  Stairs/Curb  Stairs?: No (11/15/24 1526)    Ambulation(11/16/2024 1001)  Surface: Level tile(11/16/2024 1001)  Device: Rolling Walker(11/16/2024 1001)  Assistance: Minimal assistance(11/16/2024 1001)  Quality of Gait: antalgic.(11/16/2024 1001)  Gait Deviations: Decreased step length;Decreased step height;Slow Kirk  Distance: 5' to bedside chair,  Comments: pt unable to tolerate further d/t pain and fatigue.(11/16/2024 1001)      Stairs:  Stairs/Curb  Stairs?: No (11/15/24 1534)  W/C mobility:         Occupational Therapy  Hand Dominance: Right  ADL  Feeding: Setup (11/15/24 1528)  Grooming: Setup (11/15/24 1528)  Grooming Skilled Clinical Factors: seated (11/15/24 1528)  UE Bathing: Minimal assistance (11/15/24 1528)  UE Bathing Skilled Clinical Factors: seated (11/15/24 1528)  LE Bathing: Maximum assistance (11/15/24 1528)  UE Dressing: Minimal assistance (11/15/24 1528)  LE Dressing: Maximum assistance

## 2024-11-20 NOTE — PLAN OF CARE
Problem: Chronic Conditions and Co-morbidities  Goal: Patient's chronic conditions and co-morbidity symptoms are monitored and maintained or improved  11/20/2024 1009 by Rosibel Jara RN  Outcome: Progressing  11/19/2024 2131 by Joie Kerr RN  Outcome: Progressing     Problem: Pain  Goal: Verbalizes/displays adequate comfort level or baseline comfort level  11/20/2024 1009 by Rosibel Jara RN  Outcome: Progressing  11/19/2024 2131 by Joie Kerr RN  Outcome: Progressing     Problem: Safety - Adult  Goal: Free from fall injury  11/20/2024 1009 by Rosibel Jara RN  Outcome: Progressing  11/19/2024 2131 by Joie Kerr RN  Outcome: Progressing     Problem: Pain  Goal: Verbalizes/displays adequate comfort level or baseline comfort level  11/20/2024 1009 by Rosibel Jara RN  Outcome: Progressing  11/19/2024 2131 by Joie Kerr RN  Outcome: Progressing

## 2024-11-21 PROBLEM — Z98.890 STATUS POST LUMBAR DISCECTOMY: Status: ACTIVE | Noted: 2024-11-21

## 2024-11-21 LAB
ANION GAP SERPL CALCULATED.3IONS-SCNC: 10 MEQ/L (ref 9–15)
BASOPHILS # BLD: 0 K/UL (ref 0–0.1)
BASOPHILS NFR BLD: 0.4 % (ref 0.1–1.2)
BUN SERPL-MCNC: 14 MG/DL (ref 8–23)
CALCIUM SERPL-MCNC: 9.3 MG/DL (ref 8.5–9.9)
CHLORIDE SERPL-SCNC: 101 MEQ/L (ref 95–107)
CO2 SERPL-SCNC: 29 MEQ/L (ref 20–31)
CREAT SERPL-MCNC: 0.66 MG/DL (ref 0.5–0.9)
EOSINOPHIL # BLD: 0.1 K/UL (ref 0–0.4)
EOSINOPHIL NFR BLD: 1.5 % (ref 0.7–5.8)
ERYTHROCYTE [DISTWIDTH] IN BLOOD BY AUTOMATED COUNT: 11.5 % (ref 11.7–14.4)
GLUCOSE SERPL-MCNC: 98 MG/DL (ref 70–99)
HCT VFR BLD AUTO: 33.1 % (ref 37–47)
HGB BLD-MCNC: 11.4 G/DL (ref 11.2–15.7)
IMM GRANULOCYTES # BLD: 0 K/UL
IMM GRANULOCYTES NFR BLD: 0.2 %
LYMPHOCYTES # BLD: 1.7 K/UL (ref 1.2–3.7)
LYMPHOCYTES NFR BLD: 35.4 %
MCH RBC QN AUTO: 32.2 PG (ref 25.6–32.2)
MCHC RBC AUTO-ENTMCNC: 34.4 % (ref 32.2–35.5)
MCV RBC AUTO: 93.5 FL (ref 79.4–94.8)
MONOCYTES # BLD: 1.1 K/UL (ref 0.2–0.9)
MONOCYTES NFR BLD: 22.4 % (ref 4.7–12.5)
NEUTROPHILS # BLD: 1.9 K/UL (ref 1.6–6.1)
NEUTS SEG NFR BLD: 40.1 % (ref 34–71.1)
PLATELET # BLD AUTO: 252 K/UL (ref 182–369)
POTASSIUM SERPL-SCNC: 3.7 MEQ/L (ref 3.4–4.9)
RBC # BLD AUTO: 3.54 M/UL (ref 3.93–5.22)
SODIUM SERPL-SCNC: 140 MEQ/L (ref 135–144)
WBC # BLD AUTO: 4.7 K/UL (ref 4–10)

## 2024-11-21 PROCEDURE — 97162 PT EVAL MOD COMPLEX 30 MIN: CPT

## 2024-11-21 PROCEDURE — 97530 THERAPEUTIC ACTIVITIES: CPT

## 2024-11-21 PROCEDURE — 97535 SELF CARE MNGMENT TRAINING: CPT

## 2024-11-21 PROCEDURE — 6370000000 HC RX 637 (ALT 250 FOR IP)

## 2024-11-21 PROCEDURE — 6370000000 HC RX 637 (ALT 250 FOR IP): Performed by: INTERNAL MEDICINE

## 2024-11-21 PROCEDURE — 97166 OT EVAL MOD COMPLEX 45 MIN: CPT

## 2024-11-21 PROCEDURE — 85025 COMPLETE CBC W/AUTO DIFF WBC: CPT

## 2024-11-21 PROCEDURE — 80048 BASIC METABOLIC PNL TOTAL CA: CPT

## 2024-11-21 PROCEDURE — 1200000002 HC SEMI PRIVATE SWING BED

## 2024-11-21 PROCEDURE — 97116 GAIT TRAINING THERAPY: CPT

## 2024-11-21 PROCEDURE — 36415 COLL VENOUS BLD VENIPUNCTURE: CPT

## 2024-11-21 RX ORDER — OXYCODONE HYDROCHLORIDE 5 MG/1
5 TABLET ORAL 3 TIMES DAILY
Status: DISCONTINUED | OUTPATIENT
Start: 2024-11-21 | End: 2024-11-27 | Stop reason: HOSPADM

## 2024-11-21 RX ORDER — OXYCODONE HYDROCHLORIDE 5 MG/1
5 TABLET ORAL
Status: DISCONTINUED | OUTPATIENT
Start: 2024-11-21 | End: 2024-11-27 | Stop reason: HOSPADM

## 2024-11-21 RX ADMIN — CALCIUM CARBONATE 500 MG: 500 TABLET, CHEWABLE ORAL at 20:54

## 2024-11-21 RX ADMIN — CEPHALEXIN 500 MG: 500 CAPSULE ORAL at 08:13

## 2024-11-21 RX ADMIN — CEPHALEXIN 500 MG: 500 CAPSULE ORAL at 19:55

## 2024-11-21 RX ADMIN — ATORVASTATIN CALCIUM 40 MG: 40 TABLET, FILM COATED ORAL at 08:12

## 2024-11-21 RX ADMIN — OXYCODONE 5 MG: 5 TABLET ORAL at 00:09

## 2024-11-21 RX ADMIN — CALCIUM CARBONATE 500 MG: 500 TABLET, CHEWABLE ORAL at 11:33

## 2024-11-21 RX ADMIN — ACETAMINOPHEN 650 MG: 325 TABLET ORAL at 16:46

## 2024-11-21 RX ADMIN — BISACODYL 5 MG: 5 TABLET, COATED ORAL at 08:12

## 2024-11-21 RX ADMIN — LEFLUNOMIDE 20 MG: 10 TABLET ORAL at 08:12

## 2024-11-21 RX ADMIN — OXYCODONE 5 MG: 5 TABLET ORAL at 19:55

## 2024-11-21 RX ADMIN — CARVEDILOL 6.25 MG: 3.12 TABLET, FILM COATED ORAL at 08:12

## 2024-11-21 RX ADMIN — CEPHALEXIN 500 MG: 500 CAPSULE ORAL at 14:02

## 2024-11-21 RX ADMIN — Medication 400 MG: at 08:12

## 2024-11-21 RX ADMIN — LATANOPROST 1 DROP: 50 SOLUTION OPHTHALMIC at 20:59

## 2024-11-21 RX ADMIN — Medication 1000 UNITS: at 08:12

## 2024-11-21 RX ADMIN — OXYCODONE 5 MG: 5 TABLET ORAL at 08:33

## 2024-11-21 RX ADMIN — AMLODIPINE BESYLATE 2.5 MG: 2.5 TABLET ORAL at 08:13

## 2024-11-21 RX ADMIN — OXYCODONE 5 MG: 5 TABLET ORAL at 14:45

## 2024-11-21 RX ADMIN — CARVEDILOL 6.25 MG: 3.12 TABLET, FILM COATED ORAL at 16:45

## 2024-11-21 RX ADMIN — Medication 3 MG: at 00:10

## 2024-11-21 RX ADMIN — ACETAMINOPHEN 650 MG: 325 TABLET ORAL at 10:49

## 2024-11-21 ASSESSMENT — PAIN DESCRIPTION - LOCATION
LOCATION: BACK;HIP
LOCATION: BACK;HIP
LOCATION: HIP;BACK

## 2024-11-21 ASSESSMENT — PAIN DESCRIPTION - PAIN TYPE: TYPE: SURGICAL PAIN

## 2024-11-21 ASSESSMENT — PAIN SCALES - GENERAL
PAINLEVEL_OUTOF10: 6
PAINLEVEL_OUTOF10: 3
PAINLEVEL_OUTOF10: 1
PAINLEVEL_OUTOF10: 7
PAINLEVEL_OUTOF10: 5

## 2024-11-21 ASSESSMENT — PAIN SCALES - WONG BAKER: WONGBAKER_NUMERICALRESPONSE: NO HURT

## 2024-11-21 NOTE — PROGRESS NOTES
After discussion with Dr. Hilotn, patient doing fine and progressing well after surgery. She is walking well, tolerating po. Wound healing well. Minimal drainage coming from incision. Started Keflex for wound drainage infection prophylaxis.     Plan: Awaiting for SNF approval. Discharge order updated this morning.    Jagdeep Caldwell PA-C    
Dressing change and drain removed at this time. Writer noticed that drain was not compressing. Upon further inspection of dressing, drain was almost out. Drain sponge taped over insertion site. Pt tolerated procedure well.   
Dressing change completed at this time for moderate drainage noted. Pt reported no pain during procedure and tolerated well. Care ongoing  
Hospitalist Progress Note      PCP: Sonia Snow MD    Date of Admission: 11/15/2024    Chief Complaint:    No chief complaint on file.      Subjective:  Patient denies fevers, chills, sweats, CP, SOB, diarrhea or burning micturition.  12 point ROS negative other than mentioned above     Medications:  Reviewed    Infusion Medications    sodium chloride Stopped (11/16/24 0855)    sodium chloride Stopped (11/15/24 1432)     Scheduled Medications    atorvastatin  40 mg Oral Daily    magnesium oxide  400 mg Oral Daily    Vitamin D  1,000 Units Oral Daily    carvedilol  6.25 mg Oral BID WC    amLODIPine  2.5 mg Oral Daily    latanoprost  1 drop Both Eyes Nightly    leflunomide  20 mg Oral Daily    sodium chloride flush  5-40 mL IntraVENous 2 times per day    acetaminophen  650 mg Oral 4 times per day    bisacodyl  5 mg Oral Daily     PRN Meds: calcium carbonate, sodium chloride flush, sodium chloride, oxyCODONE **OR** oxyCODONE, HYDROmorphone **OR** HYDROmorphone, ondansetron, magnesium hydroxide, polyethylene glycol    No intake or output data in the 24 hours ending 11/18/24 1029    Exam:    /66   Pulse 85   Temp 98.6 °F (37 °C) (Oral)   Resp 18   Ht 1.651 m (5' 5\")   Wt 78 kg (172 lb)   LMP  (LMP Unknown)   SpO2 93%   BMI 28.62 kg/m²     General appearance: No apparent distress, appears stated age and cooperative.  HEENT:  Conjunctivae/corneas clear.  Neck: Trachea midline.  Respiratory:  Normal respiratory effort. Clear to auscultation  Cardiovascular: Regular rate and rhythm  Abdomen: Soft, non-tender, non-distended with normal bowel sounds.  Musculoskeletal: No clubbing, cyanosis or edema bilaterally  Neuro: Non Focal.     Labs:   Recent Labs     11/18/24  0732   WBC 8.4   HGB 11.1*   HCT 31.8*        Recent Labs     11/18/24  0732      K 3.4      CO2 28   BUN 15   CREATININE 0.61   CALCIUM 8.2*     No results for input(s): \"AST\", \"ALT\", \"BILIDIR\", \"BILITOT\", \"ALKPHOS\" in the 
Hospitalist Progress Note      PCP: Sonia Snow MD    Date of Admission: 11/15/2024    Chief Complaint:    No chief complaint on file.      Subjective:  Patient denies fevers, chills, sweats, CP, SOB, diarrhea or burning micturition.  12 point ROS negative other than mentioned above     Medications:  Reviewed    Infusion Medications    sodium chloride Stopped (11/16/24 0855)    sodium chloride Stopped (11/15/24 1432)     Scheduled Medications    atorvastatin  40 mg Oral Daily    magnesium oxide  400 mg Oral Daily    Vitamin D  1,000 Units Oral Daily    carvedilol  6.25 mg Oral BID WC    amLODIPine  2.5 mg Oral Daily    latanoprost  1 drop Both Eyes Nightly    leflunomide  20 mg Oral Daily    sodium chloride flush  5-40 mL IntraVENous 2 times per day    acetaminophen  650 mg Oral 4 times per day    bisacodyl  5 mg Oral Daily     PRN Meds: melatonin, calcium carbonate, sodium chloride flush, sodium chloride, oxyCODONE **OR** oxyCODONE, HYDROmorphone **OR** HYDROmorphone, ondansetron, magnesium hydroxide, polyethylene glycol      Intake/Output Summary (Last 24 hours) at 11/19/2024 0906  Last data filed at 11/18/2024 0956  Gross per 24 hour   Intake 240 ml   Output --   Net 240 ml       Exam:    /65   Pulse 77   Temp 98.1 °F (36.7 °C) (Oral)   Resp 16   Ht 1.651 m (5' 5\")   Wt 78 kg (172 lb)   LMP  (LMP Unknown)   SpO2 98%   BMI 28.62 kg/m²     General appearance: No apparent distress, appears stated age and cooperative.  HEENT:  Conjunctivae/corneas clear.  Neck: Trachea midline.  Respiratory:  Normal respiratory effort. Clear to auscultation  Cardiovascular: Regular rate and rhythm  Abdomen: Soft, non-tender, non-distended with normal bowel sounds.  Musculoskeletal: No clubbing, cyanosis or edema bilaterally  Neuro: Non Focal.     Labs:   Recent Labs     11/18/24  0732   WBC 8.4   HGB 11.1*   HCT 31.8*        Recent Labs     11/18/24  0732      K 3.4      CO2 28   BUN 15 
[facility] MED SURG TREATMENT NOTE     Date: 2024  Patient Name: Rosemarie Grant        MRN: [id number]  Account: [id number]   : 1950  (74 y.o.)  Room: [place] [address]    Chart Review:    Restrictions  Restrictions/Precautions  Restrictions/Precautions: Fall Risk     Safety:  Safety Devices  Type of Devices: All fall risk precautions in place;Bed alarm in place;Call light within reach;Left in bed    Patient's birthday verified: Yes    Subjective: \"Who's gonna' lift my leg at home?\"     Pain   Pain at start of treatment: Yes: 7/10    Pain at end of treatment: Yes: 7/10    Location: Back, Left Leg   Nursing notified: Yes  RN: Emelyn   Intervention: None  Pt returned to bed at end of session.       Objective: Pt in bed resting upon arrival. Introduced self, explained role of OT, and offered to assist pt with morning ADLs. Pt declined reporting that he daughter was here at 0700 this morning and assisted her with thoroughly washing. Pt agreeable to transferring to EOB to demonstrate mgmt of footwear. Pt completed as follows.       Bed Mobility  Supine to Sit: Stand by assistance (Increased time and effort. Pt uses hands to advance legs)  Sit to Supine: Minimal assistance (Assistance to bring LLE into bed)  Bed Mobility Comments: HOB slightly elevated, increased time and effort, Pt uses hands to advance BLEs to compensate for impaired strength    ADL Status:  Putting On/Taking Off Footwear: Minimal assistance;Increased time to complete  Putting On/Taking Off Footwear Skilled Clinical Factors: Pt doffed/donned bilateral socks using figure four. Ot able to use hands to bring RLE into figure four to doff/don sock. Pt unable to use her hands to bring LLE into figure four position. Provided assistance to bring LLE into figure four and pt was able to doff/don sock.     Cognition Status:  Overall Cognitive Status: WFL    Therapy key for assistance levels -   Independent/Mod I = Pt. is able to 
Patient arrived to room 280 VSS, A&Ox4, pain 9/10. Orientation to room performed, pain treated, and lunch ordered.   
Per nursing, doing well.  Drain was removed yesterday.  Mobilize and D/C planning.  Michele Rivera MD    
Physical Therapy  Facility/Department: [facility] MED SURG W280/W280-01  Physical Therapy Discharge      NAME: Rosemarie Grant    : 1950 (74 y.o.)  MRN: [id number]    Account: [id number]  Gender: female      Patient has been discharged from [facility] hospital. DC patient from current PT program.      Electronically signed by Brit Guy PT on 24 at 4:29 PM EST    
Physical Therapy Med Surg Daily Treatment Note  Facility/Department: [address] ORTHO TELE  Room: [facility]       NAME: Rosemarie Grant  : 1950 (74 y.o.)  MRN: [id number]  CODE STATUS: Full Code    Date of Service: 2024    Patient Diagnosis(es): Spinal stenosis of lumbar region with neurogenic claudication [M48.062]  Lumbar stenosis with neurogenic claudication [M48.062]   No chief complaint on file.    Patient Active Problem List    Diagnosis Date Noted    Lumbar spondylosis 10/05/2022    Gastro-esophageal reflux disease without esophagitis 2022    Prsnl hx of TIA (TIA), and cereb infrc w/o resid deficits 2022    Rheumatoid arthritis, unspecified (HCC) 2022    Essential (primary) hypertension 2022    Cervical spondylosis without myelopathy 2013    Renal cyst 11/15/2024    Pain of lower extremity 11/15/2024    Impaired mobility ADLs dt RA flare 11/15/2024    Post-op pain 11/15/2024    Spinal stenosis of lumbar region with neurogenic claudication 10/22/2024    Atherosclerosis of abdominal aorta (HCC) 2024    Lumbar stenosis with neurogenic claudication 2024    DDD (degenerative disc disease), lumbar 2024    Epistaxis 2024    Lumbar strain 2024    Noninflammatory disorder of the female genital organs 2024    Trochanteric bursitis of left hip 2024    Urge incontinence of urine 2024    HLD (hyperlipidemia) 2023    Fibromyalgia 2022    Rheumatoid arthritis (HCC) 2022    Low back pain 2022        Past Medical History:   Diagnosis Date    Arthritis     Cancer (HCC)     lung cancer    Cerebral artery occlusion with cerebral infarction (HCC)     Hyperlipidemia     Hypertension     Osteoarthritis     Spinal stenosis      Past Surgical History:   Procedure Laterality Date    AXILLARY SURGERY Right 2016    DR [place]    BUNIONECTOMY Right     COLONOSCOPY      HYSTERECTOMY (CERVIX STATUS UNKNOWN)      
Physical Therapy Med Surg Daily Treatment Note  Facility/Department: [address] ORTHO TELE  Room: [facility]       NAME: Rosemarie Grant  : 1950 (74 y.o.)  MRN: [id number]  CODE STATUS: Full Code    Date of Service: 2024    Patient Diagnosis(es): Spinal stenosis of lumbar region with neurogenic claudication [M48.062]  Lumbar stenosis with neurogenic claudication [M48.062]   No chief complaint on file.    Patient Active Problem List    Diagnosis Date Noted    Lumbar spondylosis 10/05/2022    Gastro-esophageal reflux disease without esophagitis 2022    Prsnl hx of TIA (TIA), and cereb infrc w/o resid deficits 2022    Rheumatoid arthritis, unspecified (HCC) 2022    Essential (primary) hypertension 2022    Cervical spondylosis without myelopathy 2013    Renal cyst 11/15/2024    Pain of lower extremity 11/15/2024    Impaired mobility ADLs dt RA flare 11/15/2024    Post-op pain 11/15/2024    Spinal stenosis of lumbar region with neurogenic claudication 10/22/2024    Atherosclerosis of abdominal aorta (HCC) 2024    Lumbar stenosis with neurogenic claudication 2024    DDD (degenerative disc disease), lumbar 2024    Epistaxis 2024    Lumbar strain 2024    Noninflammatory disorder of the female genital organs 2024    Trochanteric bursitis of left hip 2024    Urge incontinence of urine 2024    HLD (hyperlipidemia) 2023    Fibromyalgia 2022    Rheumatoid arthritis (HCC) 2022    Low back pain 2022        Past Medical History:   Diagnosis Date    Arthritis     Cancer (HCC)     lung cancer    Cerebral artery occlusion with cerebral infarction (HCC)     Hyperlipidemia     Hypertension     Osteoarthritis      Past Surgical History:   Procedure Laterality Date    AXILLARY SURGERY Right 2016    DR [place]    BUNIONECTOMY Right     COLONOSCOPY      HYSTERECTOMY (CERVIX STATUS UNKNOWN)      LAMINECTOMY Left 
Physical Therapy Med Surg Daily Treatment Note  Facility/Department: [address] ORTHO TELE  Room: [facility]       NAME: Rosemarie Grant  : 1950 (74 y.o.)  MRN: [id number]  CODE STATUS: Full Code    Date of Service: 2024    Patient Diagnosis(es): Spinal stenosis of lumbar region with neurogenic claudication [M48.062]  Lumbar stenosis with neurogenic claudication [M48.062]   No chief complaint on file.    Patient Active Problem List    Diagnosis Date Noted    Lumbar spondylosis 10/05/2022    Gastro-esophageal reflux disease without esophagitis 2022    Prsnl hx of TIA (TIA), and cereb infrc w/o resid deficits 2022    Rheumatoid arthritis, unspecified (HCC) 2022    Essential (primary) hypertension 2022    Cervical spondylosis without myelopathy 2013    Renal cyst 11/15/2024    Pain of lower extremity 11/15/2024    Impaired mobility ADLs dt RA flare 11/15/2024    Post-op pain 11/15/2024    Spinal stenosis of lumbar region with neurogenic claudication 10/22/2024    Atherosclerosis of abdominal aorta (HCC) 2024    Lumbar stenosis with neurogenic claudication 2024    DDD (degenerative disc disease), lumbar 2024    Epistaxis 2024    Lumbar strain 2024    Noninflammatory disorder of the female genital organs 2024    Trochanteric bursitis of left hip 2024    Urge incontinence of urine 2024    HLD (hyperlipidemia) 2023    Fibromyalgia 2022    Rheumatoid arthritis (HCC) 2022    Low back pain 2022        Past Medical History:   Diagnosis Date    Arthritis     Cancer (HCC)     lung cancer    Cerebral artery occlusion with cerebral infarction (HCC)     Hyperlipidemia     Hypertension     Osteoarthritis      Past Surgical History:   Procedure Laterality Date    AXILLARY SURGERY Right 2016    DR [place]    BUNIONECTOMY Right     COLONOSCOPY      HYSTERECTOMY (CERVIX STATUS UNKNOWN)      LYMPH NODE BIOPSY 
Physical Therapy Med Surg Initial Assessment  Facility/Department: [address] ORTHO TELE  Room: [facility]       NAME: Rosemarie Grant  : 1950 (74 y.o.)  MRN: [id number]  CODE STATUS: Full Code    Date of Service: 11/15/2024    Patient Diagnosis(es): Spinal stenosis of lumbar region with neurogenic claudication [M48.062]  Lumbar stenosis with neurogenic claudication [M48.062]   No chief complaint on file.    Patient Active Problem List    Diagnosis Date Noted    Lumbar spondylosis 10/05/2022    Gastro-esophageal reflux disease without esophagitis 2022    Prsnl hx of TIA (TIA), and cereb infrc w/o resid deficits 2022    Rheumatoid arthritis, unspecified (HCC) 2022    Essential (primary) hypertension 2022    Cervical spondylosis without myelopathy 2013    Renal cyst 11/15/2024    Pain of lower extremity 11/15/2024    Impaired mobility and activities of daily living 11/15/2024    Post-op pain 11/15/2024    Spinal stenosis of lumbar region with neurogenic claudication 10/22/2024    Atherosclerosis of abdominal aorta (HCC) 2024    Lumbar stenosis with neurogenic claudication 2024    DDD (degenerative disc disease), lumbar 2024    Epistaxis 2024    Lumbar strain 2024    Noninflammatory disorder of the female genital organs 2024    Trochanteric bursitis of left hip 2024    Urge incontinence of urine 2024    HLD (hyperlipidemia) 2023    Fibromyalgia 2022    Rheumatoid arthritis (HCC) 2022    Low back pain 2022      Past Medical History:   Diagnosis Date    Arthritis     Cancer (HCC)     lung cancer    Cerebral artery occlusion with cerebral infarction (HCC)     Hyperlipidemia     Hypertension     Osteoarthritis      Past Surgical History:   Procedure Laterality Date    AXILLARY SURGERY Right 2016    DR [place]    BUNIONECTOMY Right     COLONOSCOPY      HYSTERECTOMY (CERVIX STATUS UNKNOWN)      LYMPH NODE 
Post op day #3 left bilateral L4-5 S1 decompression left L5-S1 discectomy.  Patient feels she is slowly improving since surgery.  Preoperative left lower extremity symptoms of numbness have improved postoperatively.  Starting to ambulate to the bathroom and small distances with walker.  Pain under good control.       Physical Exam:  Awake and alert  Motor 5/5 LE  Sensation intact to light touch BLE  Incision C/D/I     Assessment/Plan:  Postop day 3 status post left bilateral L4-5 S1 decompression left L5-S1 discectomy.  Patient is showing good signs progression after surgery.  She feels uncomfortable returning home, she lives alone and does not feel she can function independently.  We will continue to mobilize patient to her comfort.  Awaiting insurance approval for rehab.    Jagdeep Caldwell PA-C     
Post op day #4 left bilateral L4-5 S1 decompression left L5-S1 discectomy.  Preoperative left lower extremity symptoms of numbness have improved postoperatively, but are still mild.  Starting to ambulate to the bathroom and small distances with walker.  Pain under good control.     Physical Exam:  Awake and alert  Motor 5/5 LE  Sensation intact to light touch BLE  Incision C/I, small amounts of serosanguinous drainage from superior aspect of wound. Dressing present.    Assessment/Plan:  Postop day 4 status post left bilateral L4-5 S1 decompression left L5-S1 discectomy.  Patient is showing good signs progression after surgery. Continue to mobilize patient to her comfort.  Acute inpatient rehab awaiting P2P, PM&R notified.     Jagdeep Caldwell PA-C     
Postop day #1 left bilateral L4-5 S1 decompression left L5-S1 discectomy.    Most of the preoperative tingling numbness left lower extremity is reversed to nearly normal.  Regained some strength left lower extremity compared to preop.  Starting to ambulate with walker minimal assistance to bathroom.  Pain under control with current regimen.      She lives by herself and is unable to care for herself at home.    Wound appears to be healing well.  Wound drain removed.    Evaluate for discharge transfer rehabilitation OT PT  worker rehabilitation.    Appreciate evaluation treatment hospitalist for medical comorbidities.    
Progress Note  Date:2024       Room:[facility]/W280-01  Patient Name:Rosemarie Grant     YOB: 1950     Age:74 y.o.        Subjective    Subjective:  Symptoms:  She reports malaise and weakness.  No shortness of breath, cough, chest pain, headache, chest pressure, anorexia, diarrhea or anxiety.       Review of Systems   Respiratory:  Negative for cough and shortness of breath.    Cardiovascular:  Negative for chest pain.   Gastrointestinal:  Negative for anorexia and diarrhea.   Neurological:  Positive for weakness.     Objective         Vitals Last 24 Hours:  TEMPERATURE:  Temp  Av.4 °F (36.3 °C)  Min: 97.2 °F (36.2 °C)  Max: 98 °F (36.7 °C)  RESPIRATIONS RANGE: Resp  Av.9  Min: 7  Max: 19  PULSE OXIMETRY RANGE: SpO2  Av.1 %  Min: 95 %  Max: 100 %  PULSE RANGE: Pulse  Av.1  Min: 60  Max: 84  BLOOD PRESSURE RANGE: Systolic (24hrs), Av , Min:128 , Max:163   ; Diastolic (24hrs), Av, Min:54, Max:74    I/O (24Hr):    Intake/Output Summary (Last 24 hours) at 2024 1215  Last data filed at 2024 0859  Gross per 24 hour   Intake 350 ml   Output 700 ml   Net -350 ml     Objective:  General Appearance:  Comfortable, well-appearing and in no acute distress.    Vital signs: (most recent): Blood pressure (!) 142/66, pulse 66, temperature 97.5 °F (36.4 °C), temperature source Oral, resp. rate 18, height 1.651 m (5' 5\"), weight 78 kg (172 lb), SpO2 95%.    HEENT: Normal HEENT exam.    Lungs:  There are decreased breath sounds.    Heart: S1 normal and S2 normal.    Abdomen: Abdomen is soft.  Bowel sounds are normal.   There is no epigastric area or suprapubic area tenderness.     Neurological: Patient is alert.    Pupils:  Pupils are equal, round, and reactive to light.    Skin:  Warm.      Labs/Imaging/Diagnostics    Labs:  CBC:No results for input(s): \"WBC\", \"RBC\", \"HGB\", \"HCT\", \"MCV\", \"RDW\", \"PLT\" in the last 72 hours.  CHEMISTRIES:No results for input(s): \"NA\", \"K\", \"CL\", 
Progress Note  Date:2024       Room:W280/W280-01  Patient Name:Rosemarie Grant     YOB: 1950     Age:74 y.o.        Subjective    Subjective:  Symptoms:  She reports malaise and weakness.  No shortness of breath, cough, chest pain, headache, chest pressure, anorexia, diarrhea or anxiety.       Review of Systems   Respiratory:  Negative for cough and shortness of breath.    Cardiovascular:  Negative for chest pain.   Gastrointestinal:  Negative for anorexia and diarrhea.   Neurological:  Positive for weakness.     Objective         Vitals Last 24 Hours:  TEMPERATURE:  Temp  Av.4 °F (36.3 °C)  Min: 97.1 °F (36.2 °C)  Max: 97.7 °F (36.5 °C)  RESPIRATIONS RANGE: Resp  Av.4  Min: 16  Max: 18  PULSE OXIMETRY RANGE: SpO2  Av.5 %  Min: 97 %  Max: 100 %  PULSE RANGE: Pulse  Av.3  Min: 67  Max: 75  BLOOD PRESSURE RANGE: Systolic (24hrs), Av , Min:127 , Max:153   ; Diastolic (24hrs), Av, Min:54, Max:69    I/O (24Hr):    Intake/Output Summary (Last 24 hours) at 2024 1109  Last data filed at 2024 1739  Gross per 24 hour   Intake 240 ml   Output --   Net 240 ml     Objective:  General Appearance:  Comfortable, well-appearing and in no acute distress.    Vital signs: (most recent): Blood pressure 127/63, pulse 72, temperature 97.7 °F (36.5 °C), temperature source Oral, resp. rate 18, height 1.651 m (5' 5\"), weight 78 kg (172 lb), SpO2 97%.    HEENT: Normal HEENT exam.    Lungs:  There are decreased breath sounds.    Heart: S1 normal and S2 normal.    Abdomen: Abdomen is soft.  Bowel sounds are normal.   There is no epigastric area or suprapubic area tenderness.     Neurological: Patient is alert.    Pupils:  Pupils are equal, round, and reactive to light.    Skin:  Warm.      Labs/Imaging/Diagnostics    Labs:  CBC:No results for input(s): \"WBC\", \"RBC\", \"HGB\", \"HCT\", \"MCV\", \"RDW\", \"PLT\" in the last 72 hours.  CHEMISTRIES:No results for input(s): \"NA\", \"K\", \"CL\", \"CO2\", 
Pt refused ambulation at beginning of this nurses shift, stating that she would wait for PT/OT to work with her tomorrow. Writer explained importance of early ambulation post op and following doctors orders. Pt verbalized understanding. She denies any other needs, call light remains in reach, care ongoing  
Subjective:  The patient complains of severe acute on chronic progressive fatigue and postop pain partially relieved by rest, PT, OT and meds and exacerbated by exertion and recent complicated back surgery.      74 y.o. female admitted to [facility] on 11/15/2024.       Pt is recovering from:    Date: 11/15/24 Room / Location: [address]     Anesthesia Start: 0741 Anesthesia Stop: 1048     Procedure: Left and bilateral L4-5-S1 decompression left L5-S1 discectomy. (Left: Spine Lumbar) Diagnosis:       Spinal stenosis of lumbar region with neurogenic claudication      (Spinal stenosis of lumbar region with neurogenic claudication [M48.062])     Surgeons: Lydia Hilton MD Responsible Provider: Zak Kohler MD     Anesthesia Type: general ASA Status: 3        I am concerned about patient’s medical complexities including:  Principal Problem:    Spinal stenosis of lumbar region with neurogenic claudication  Active Problems:    Essential (primary) hypertension    Rheumatoid arthritis, unspecified (HCC)    Lumbar stenosis with neurogenic claudication    Low back pain    Impaired mobility ADLs dt RA flare    Post-op pain  Resolved Problems:    * No resolved hospital problems. *      .    Controlled Substance Monitoring:    Acute and Chronic Pain Monitoring:   RX Monitoring Acute Pain Prescriptions Periodic Controlled Substance Monitoring   11/17/2024   9:38 AM Prescription exceeds daily limit for a specific reason. See comments or note.;Severe pain not adequately treated with lower dose.;Not required given exclusionary diagnoses... Possible medication side effects, risk of tolerance/dependence & alternative treatments discussed.;No signs of potential drug abuse or diversion identified.;Assessed functional status (ability to engage in work or other purposeful activities, the pain intensity and its interference with activities of daily living, quality of family life and 
Subjective:  The patient complains of severe acute on chronic progressive fatigue and postop pain partially relieved by rest, PT, OT and meds and exacerbated by exertion and recent complicated back surgery.      74 y.o. female admitted to [facility] on 11/15/2024.       Pt is recovering from:    Date: 11/15/24 Room / Location: [address]     Anesthesia Start: 0741 Anesthesia Stop: 1048     Procedure: Left and bilateral L4-5-S1 decompression left L5-S1 discectomy. (Left: Spine Lumbar) Diagnosis:       Spinal stenosis of lumbar region with neurogenic claudication      (Spinal stenosis of lumbar region with neurogenic claudication [M48.062])     Surgeons: Lydia Hilton MD Responsible Provider: Zak Kohler MD     Anesthesia Type: general ASA Status: 3        I am concerned about patient’s medical complexities including:  Principal Problem:    Spinal stenosis of lumbar region with neurogenic claudication  Active Problems:    Essential (primary) hypertension    Rheumatoid arthritis, unspecified (HCC)    Lumbar stenosis with neurogenic claudication    Low back pain    Impaired mobility ADLs dt RA flare    Post-op pain  Resolved Problems:    * No resolved hospital problems. *      .    Controlled Substance Monitoring:    Acute and Chronic Pain Monitoring:   RX Monitoring Acute Pain Prescriptions Periodic Controlled Substance Monitoring   11/17/2024   9:38 AM Prescription exceeds daily limit for a specific reason. See comments or note.;Severe pain not adequately treated with lower dose.;Not required given exclusionary diagnoses... Possible medication side effects, risk of tolerance/dependence & alternative treatments discussed.;No signs of potential drug abuse or diversion identified.;Assessed functional status (ability to engage in work or other purposeful activities, the pain intensity and its interference with activities of daily living, quality of family life and 
Writer assisted pt to bedside commode at this time. She independently stood at the bedside and pivoted with walker to urinate and return to bed. Some pain noted, but no other complaints.   
     CO2 28   BUN 15   CREATININE 0.61   CALCIUM 8.2*     No results for input(s): \"AST\", \"ALT\", \"BILIDIR\", \"BILITOT\", \"ALKPHOS\" in the last 72 hours.  No results for input(s): \"INR\" in the last 72 hours.  No results for input(s): \"CKTOTAL\", \"TROPONINI\" in the last 72 hours.    Urinalysis:      Lab Results   Component Value Date/Time    NITRU Negative 11/19/2024 06:26 PM    BLOODU Negative 11/19/2024 06:26 PM    GLUCOSEU Negative 11/19/2024 06:26 PM       Radiology:  XR CHEST PORTABLE   Final Result   1. Postoperative changes in the right hemithorax..   2. No acute cardiopulmonary disease.         FLUORO FOR SURGICAL PROCEDURES   Final Result   Intraprocedural fluoroscopic spot images as above.  See separate procedure   report for more information.           Assessment/Plan:    #Post proc care    - per primary team    #HTN/HLD    - continue home meds    #Fever    - reviewed UA and cxr; probable post op fever; empirically placed on keflex by primary team    Active Hospital Problems    Diagnosis Date Noted    Rheumatoid arthritis, unspecified (HCC) [M06.9] 06/29/2022     Priority: Medium    Essential (primary) hypertension [I10] 02/11/2022     Priority: Medium    Cervical spondylosis without myelopathy [M47.812] 05/07/2013     Priority: Medium    Impaired mobility ADLs dt RA flare [Z74.09, Z78.9] 11/15/2024    Post-op pain [G89.18] 11/15/2024    Spinal stenosis of lumbar region with neurogenic claudication [M48.062] 10/22/2024    Lumbar stenosis with neurogenic claudication [M48.062] 08/07/2024    Rheumatoid arthritis (HCC) [M06.9] 12/01/2022    Low back pain [M54.50] 02/11/2022        Additional work up or/and treatment plan may be added today or then after based on clinical progression. I am managing a portion of pt care. Some medical issues are handled by other specialists. Additional work up and treatment should be done in out pt setting by pt PCP and other out pt providers.     In addition to examining and 
NODE BIOPSY Left     2010    LYMPH NODE DISSECTION Right 12/21/2016    EXCISION  OF RIGHT AXILLARY MASS performed by Anam Rodriguez MD at [facility] [place]    TONSILLECTOMY         Chart Reviewed: Yes  Family / Caregiver Present: No    Restrictions:  Restrictions/Precautions: Fall Risk    SUBJECTIVE:   Subjective: \"the pain is all in my back now it's not down my leg.\"    Pain  Pain: 5/10 pre/post tx surgical back pain.    OBJECTIVE:        Bed mobility  Supine to Sit: Stand by assistance (pt completes log roll without cues, increased time and effort to complete.)  Sit to Supine:  (DNT pt into bedside chair to promote OOB activity.)    Transfers  Sit to Stand: Stand by assistance  Stand to Sit: Stand by assistance  Bed to Chair: Stand by assistance  Comment: VC for safe hand placement and improved body mechanics, increased time and effort.    Ambulation  Surface: Level tile  Device: Rolling Walker  Assistance: Minimal assistance  Quality of Gait: antalgic.  Gait Deviations: Decreased step length;Decreased step height;Slow Jagruti  Distance: 5' to bedside chair,  Comments: pt unable to tolerate further d/t pain and fatigue.                              Activity Tolerance  Activity Tolerance: Patient tolerated treatment well          ASSESSMENT   Assessment: pt progresses mobility able to ambulate short distance in room. increased time and effort for all tasks. pt unable to tolerate further tx d/t pain and fatigue.     Discharge Recommendations:  Continue to assess pending progress         Goals  Long Term Goals  Long Term Goal 1: Pt will demosntrate bed mobility with indep  Long Term Goal 2: Pt will demonstrate functional transfers with indep  Long Term Goal 3: Pt will amb 150ft with LRAD and indep  Long Term Goal 4: Pt will negotiate 4 steps with handrail and supervision  Patient Goals   Patient Goals : to go home    PLAN    General Plan: 1 time a day 7 days a week  Safety Devices  Type of Devices: All fall risk precautions 
week  Safety Devices  Type of Devices: All fall risk precautions in place, Bed alarm in place, Call light within reach, Left in bed     AMPAC (6 CLICK) BASIC MOBILITY  AM-PAC Inpatient Mobility Raw Score : 17      Therapy Time   Individual   Time In 1048   Time Out 1105   Minutes 17      Gait: 10  BM/trsf: 7        Simon STEPHANIE García, 11/19/24 at 11:37 AM         Definitions for assistance levels  Independent = pt does not require any physical supervision or assistance from another person for activity completion. Device may be needed.  Stand by assistance = pt requires verbal cues or instructions from another person, close to but not touching, to perform the activity  Minimal assistance= pt performs 75% or more of the activity; assistance is required to complete the activity  Moderate assistance= pt performs 50% of the activity; assistance is required to complete the activity  Maximal assistance = pt performs 25% of the activity; assistance is required to complete the activity  Dependent = pt requires total physical assistance to accomplish the task  
a 73 y/o female, recently admitted to [facility] s/p Left and bilateral L4-5-S1 decompression left L5-S1 discectomy. Pt lives alone in a 1 story home and reported IND PTA with ADLs, IADLs and functional transfers. Pt presented with the above deficits and is limited by pain. Pt may benefit from OT intervention for increased IND with ADLs, IADLs and functional transfers to reduce the burden of care upon d/c to home  Performance deficits / Impairments: Decreased functional mobility , Decreased ADL status, Decreased vision/visual deficit, Decreased balance, Decreased sensation, Decreased endurance, Decreased high-level IADLs  Prognosis: Good  Discharge Recommendations: Continue to assess pending progress  Decision Making: Medium Complexity     History: multi comorbidities  Exam: 7 deficits  Assistance / Modification: MAX A    AMPA (Six Click) Self care Score   How much help is needed for putting on and taking off regular lower body clothing?: A Lot  How much help is needed for bathing (which includes washing, rinsing, drying)?: A Lot  How much help is needed for toileting (which includes using toilet, bedpan, or urinal)?: A Lot  How much help is needed for putting on and taking off regular upper body clothing?: A Little  How much help is needed for taking care of personal grooming?: A Little  How much help for eating meals?: A Little  AM-[facility] Inpatient Daily Activity Raw Score: 15  AM-PAC Inpatient ADL T-Scale Score : 34.69  ADL Inpatient CMS 0-100% Score: 56.46    Therapy key for assistance levels -   Independent/Mod I = Pt. is able to perform task with no assistance but may require a device   Stand by assistance = Pt. does not perform task at an independent level but does not need physical assistance, requires verbal cues  Minimal, Moderate, Maximal Assistance = Pt. requires physical assistance (25%, 50%, 75% assist from helper) for task but is able to actively participate in task   Dependent = Pt. requires total 
Plan:   Severe abnormality of gait and mobility and impaired self-care and ADL's secondary to rheumatoid arthritis flareup status post surgery for lumbar spinal stenosis.  Updated functional and medical status reassessed regarding patient’s ability to participate in therapies and patient found to be able to participate in:          acute intensive comprehensive inpatient rehabilitation program including PT/OT to improve balance, ambulation, ADL’s, and to improve the P/AROM.   It is my opinion that they will be able to tolerate 3 hours of therapy a day and benefit from it at an acute level. I again discussed acute rehab with the patient and verify that the patient is able and willing to participate in 3 hours of therapy a day.  Rehab and Acute Care Case Management has also reinforced this expectation.    Will continue to follow to attempt to get patient to the most efficient but most effective level of care will be in their best interest.  Continue to focus on energy conservation heart rate and blood pressure monitoring before during and after therapy endurance and consistency of function.      Bowel constipation   and Bladder dysfunction   overactive, neurogenic bladder:  frequent toileting, ambulate to bathroom with assistance, check post void residuals.  Check for C.difficile x1 if >2 loose stools in 24 hours, continue bowel & bladder program.  Monitor for UTI symptoms including lethargy and confusion    Severe postop back pain and generalized OA pain: reassess pain every shift and prior to and after each therapy session, give prn Tylenol versus Dilaudid versus oxycodone and consider scheduled Tylenol, modalities prn in therapy, consider Lidoderm, K-pad prn.     Skin healing lumbar spine incision breakdown   risk:  continue pressure relief program.  Daily skin exams and reports from nursing.    Severe fatigue due to immobility and nutritional deficits: continue to monitor closely for dehydration   Add vitamin B12

## 2024-11-21 NOTE — H&P
Hospital Medicine  History and Physical    Patient:  Rosemarie Grant  MRN: [id number]    CHIEF COMPLAINT:  No chief complaint on file.      History Obtained From:  Patient, EMR  Primary Care Physician: Sonia Snow MD    HISTORY OF PRESENT ILLNESS:   The patient is a 74 y.o. female with PMH of rheumatoid arthritis, hypertension, hyperlipidemia and spinal stenosis.  She underwent lumbar laminectomy by Dr. Hilton on 11/15.  Subsequently patient was admitted to the skilled unit at [facility] for inpatient physical and Occupational Therapy.  Patient reports having pain.  Her pain is usually around 4-5 on a scale of 10 but occasionally goes up to 8-9 on a scale of 10.  Patient stated that she has to ask for pain medication ahead of time so does not get bad enough to reach 8 on a scale of 10.  No fever or chills.  No abdominal pain, nausea vomiting, diarrhea, dysuria hematuria.    Past Medical History:      Diagnosis Date    Arthritis     Cancer (HCC)     lung cancer    Cerebral artery occlusion with cerebral infarction (HCC)     Hyperlipidemia     Hypertension     Osteoarthritis     Spinal stenosis        Past Surgical History:      Procedure Laterality Date    AXILLARY SURGERY Right 12/21/2016    DR [facility]    BUNIONECTOMY Right     COLONOSCOPY      HYSTERECTOMY (CERVIX STATUS UNKNOWN)      LAMINECTOMY Left 11/15/2024    Left and bilateral L4-5-S1 decompression left L5-S1 discectomy. performed by Lydia Hilton MD at [facility] [place]    LYMPH NODE BIOPSY Left     2010    LYMPH NODE DISSECTION Right 12/21/2016    EXCISION  OF RIGHT AXILLARY MASS performed by Ingrid Hills MD at [facility] [place]    TONSILLECTOMY         Medications Prior to Admission:    Prior to Admission medications    Medication Sig Start Date End Date Taking? Authorizing Provider   latanoprost (XALATAN) 0.005 % ophthalmic solution INSTILL 1 DROP INTO BOTH EYES EVERY NIGHT AT BEDTIME 8/23/24  Yes Provider, MD María Elena   leflunomide (ARAVA) 20 MG tablet

## 2024-11-21 NOTE — PLAN OF CARE
Problem: Chronic Conditions and Co-morbidities  Goal: Patient's chronic conditions and co-morbidity symptoms are monitored and maintained or improved  Outcome: Progressing     Problem: Discharge Planning  Goal: Discharge to home or other facility with appropriate resources  Outcome: Progressing  Flowsheets (Taken 11/20/2024 6165 by Rebeca Hilmlan, RN)  Discharge to home or other facility with appropriate resources: Identify discharge learning needs (meds, wound care, etc)     Problem: Safety - Adult  Goal: Free from fall injury  Outcome: Progressing     Problem: Pain  Goal: Verbalizes/displays adequate comfort level or baseline comfort level  Outcome: Progressing

## 2024-11-22 PROCEDURE — 6370000000 HC RX 637 (ALT 250 FOR IP): Performed by: INTERNAL MEDICINE

## 2024-11-22 PROCEDURE — 97110 THERAPEUTIC EXERCISES: CPT

## 2024-11-22 PROCEDURE — 97116 GAIT TRAINING THERAPY: CPT

## 2024-11-22 PROCEDURE — 6370000000 HC RX 637 (ALT 250 FOR IP)

## 2024-11-22 PROCEDURE — 1200000002 HC SEMI PRIVATE SWING BED

## 2024-11-22 PROCEDURE — 97530 THERAPEUTIC ACTIVITIES: CPT

## 2024-11-22 RX ADMIN — OXYCODONE 5 MG: 5 TABLET ORAL at 14:20

## 2024-11-22 RX ADMIN — Medication 3 MG: at 21:31

## 2024-11-22 RX ADMIN — CALCIUM CARBONATE 500 MG: 500 TABLET, CHEWABLE ORAL at 21:33

## 2024-11-22 RX ADMIN — OXYCODONE 5 MG: 5 TABLET ORAL at 17:23

## 2024-11-22 RX ADMIN — CARVEDILOL 6.25 MG: 3.12 TABLET, FILM COATED ORAL at 07:58

## 2024-11-22 RX ADMIN — BISACODYL 5 MG: 5 TABLET, COATED ORAL at 07:58

## 2024-11-22 RX ADMIN — OXYCODONE 5 MG: 5 TABLET ORAL at 20:11

## 2024-11-22 RX ADMIN — ACETAMINOPHEN 650 MG: 325 TABLET ORAL at 17:12

## 2024-11-22 RX ADMIN — ACETAMINOPHEN 650 MG: 325 TABLET ORAL at 11:55

## 2024-11-22 RX ADMIN — LEFLUNOMIDE 20 MG: 10 TABLET ORAL at 07:58

## 2024-11-22 RX ADMIN — CEPHALEXIN 500 MG: 500 CAPSULE ORAL at 14:20

## 2024-11-22 RX ADMIN — AMLODIPINE BESYLATE 2.5 MG: 2.5 TABLET ORAL at 07:59

## 2024-11-22 RX ADMIN — Medication 400 MG: at 07:59

## 2024-11-22 RX ADMIN — LATANOPROST 1 DROP: 50 SOLUTION OPHTHALMIC at 20:01

## 2024-11-22 RX ADMIN — ACETAMINOPHEN 650 MG: 325 TABLET ORAL at 02:40

## 2024-11-22 RX ADMIN — ATORVASTATIN CALCIUM 40 MG: 40 TABLET, FILM COATED ORAL at 07:59

## 2024-11-22 RX ADMIN — OXYCODONE 5 MG: 5 TABLET ORAL at 02:40

## 2024-11-22 RX ADMIN — CEPHALEXIN 500 MG: 500 CAPSULE ORAL at 07:59

## 2024-11-22 RX ADMIN — OXYCODONE 5 MG: 5 TABLET ORAL at 07:59

## 2024-11-22 RX ADMIN — CEPHALEXIN 500 MG: 500 CAPSULE ORAL at 20:01

## 2024-11-22 RX ADMIN — Medication 1000 UNITS: at 07:59

## 2024-11-22 ASSESSMENT — PAIN - FUNCTIONAL ASSESSMENT
PAIN_FUNCTIONAL_ASSESSMENT: ACTIVITIES ARE NOT PREVENTED

## 2024-11-22 ASSESSMENT — PAIN SCALES - GENERAL
PAINLEVEL_OUTOF10: 7
PAINLEVEL_OUTOF10: 6
PAINLEVEL_OUTOF10: 2
PAINLEVEL_OUTOF10: 6
PAINLEVEL_OUTOF10: 7
PAINLEVEL_OUTOF10: 6
PAINLEVEL_OUTOF10: 4

## 2024-11-22 ASSESSMENT — PAIN DESCRIPTION - ORIENTATION
ORIENTATION: LOWER

## 2024-11-22 ASSESSMENT — PAIN SCALES - WONG BAKER: WONGBAKER_NUMERICALRESPONSE: NO HURT

## 2024-11-22 ASSESSMENT — PAIN DESCRIPTION - DESCRIPTORS
DESCRIPTORS: ACHING

## 2024-11-22 ASSESSMENT — PAIN DESCRIPTION - LOCATION
LOCATION: BACK

## 2024-11-22 ASSESSMENT — PAIN DESCRIPTION - ONSET
ONSET: ON-GOING
ONSET: ON-GOING

## 2024-11-22 ASSESSMENT — PAIN DESCRIPTION - FREQUENCY
FREQUENCY: CONTINUOUS
FREQUENCY: CONTINUOUS

## 2024-11-22 ASSESSMENT — PAIN DESCRIPTION - PAIN TYPE
TYPE: SURGICAL PAIN
TYPE: SURGICAL PAIN

## 2024-11-22 NOTE — NURSE NAVIGATOR
Dressing changed d/t large amount of shadowing noted on dressing and per pts. Request.  Wound cleansed with saline and DSD applied.

## 2024-11-22 NOTE — PLAN OF CARE
Problem: Chronic Conditions and Co-morbidities  Goal: Patient's chronic conditions and co-morbidity symptoms are monitored and maintained or improved  Outcome: Progressing     Problem: Discharge Planning  Goal: Discharge to home or other facility with appropriate resources  Outcome: Progressing  Flowsheets (Taken 11/21/2024 0946 by Rozina Tejeda RN)  Discharge to home or other facility with appropriate resources: Identify barriers to discharge with patient and caregiver     Problem: Safety - Adult  Goal: Free from fall injury  Outcome: Progressing     Problem: Pain  Goal: Verbalizes/displays adequate comfort level or baseline comfort level  Outcome: Progressing

## 2024-11-22 NOTE — PLAN OF CARE
Nutrition Problem #1: No nutrition diagnosis at this time  Intervention: Food and/or Nutrient Delivery: Continue Current Diet, Start Oral Nutrition Supplement

## 2024-11-23 PROCEDURE — 6370000000 HC RX 637 (ALT 250 FOR IP)

## 2024-11-23 PROCEDURE — 97530 THERAPEUTIC ACTIVITIES: CPT

## 2024-11-23 PROCEDURE — 97110 THERAPEUTIC EXERCISES: CPT

## 2024-11-23 PROCEDURE — 97116 GAIT TRAINING THERAPY: CPT

## 2024-11-23 PROCEDURE — 1200000002 HC SEMI PRIVATE SWING BED

## 2024-11-23 PROCEDURE — 6370000000 HC RX 637 (ALT 250 FOR IP): Performed by: INTERNAL MEDICINE

## 2024-11-23 RX ADMIN — CEPHALEXIN 500 MG: 500 CAPSULE ORAL at 13:51

## 2024-11-23 RX ADMIN — Medication 400 MG: at 08:19

## 2024-11-23 RX ADMIN — OXYCODONE 5 MG: 5 TABLET ORAL at 20:30

## 2024-11-23 RX ADMIN — ACETAMINOPHEN 650 MG: 325 TABLET ORAL at 08:20

## 2024-11-23 RX ADMIN — Medication 1000 UNITS: at 08:20

## 2024-11-23 RX ADMIN — BISACODYL 5 MG: 5 TABLET, COATED ORAL at 08:19

## 2024-11-23 RX ADMIN — OXYCODONE 5 MG: 5 TABLET ORAL at 03:17

## 2024-11-23 RX ADMIN — ATORVASTATIN CALCIUM 40 MG: 40 TABLET, FILM COATED ORAL at 08:20

## 2024-11-23 RX ADMIN — OXYCODONE 5 MG: 5 TABLET ORAL at 16:50

## 2024-11-23 RX ADMIN — CEPHALEXIN 500 MG: 500 CAPSULE ORAL at 20:31

## 2024-11-23 RX ADMIN — OXYCODONE 5 MG: 5 TABLET ORAL at 08:20

## 2024-11-23 RX ADMIN — CEPHALEXIN 500 MG: 500 CAPSULE ORAL at 08:19

## 2024-11-23 RX ADMIN — LATANOPROST 1 DROP: 50 SOLUTION OPHTHALMIC at 20:32

## 2024-11-23 RX ADMIN — LEFLUNOMIDE 20 MG: 10 TABLET ORAL at 08:20

## 2024-11-23 RX ADMIN — ACETAMINOPHEN 650 MG: 325 TABLET ORAL at 16:51

## 2024-11-23 RX ADMIN — CARVEDILOL 6.25 MG: 3.12 TABLET, FILM COATED ORAL at 08:20

## 2024-11-23 RX ADMIN — ACETAMINOPHEN 650 MG: 325 TABLET ORAL at 20:29

## 2024-11-23 RX ADMIN — OXYCODONE 5 MG: 5 TABLET ORAL at 13:51

## 2024-11-23 RX ADMIN — AMLODIPINE BESYLATE 2.5 MG: 2.5 TABLET ORAL at 08:19

## 2024-11-23 ASSESSMENT — PAIN DESCRIPTION - PAIN TYPE
TYPE: SURGICAL PAIN
TYPE: SURGICAL PAIN

## 2024-11-23 ASSESSMENT — PAIN SCALES - GENERAL
PAINLEVEL_OUTOF10: 0
PAINLEVEL_OUTOF10: 8
PAINLEVEL_OUTOF10: 7
PAINLEVEL_OUTOF10: 6
PAINLEVEL_OUTOF10: 0
PAINLEVEL_OUTOF10: 7
PAINLEVEL_OUTOF10: 7

## 2024-11-23 ASSESSMENT — PAIN DESCRIPTION - FREQUENCY
FREQUENCY: CONTINUOUS
FREQUENCY: CONTINUOUS

## 2024-11-23 ASSESSMENT — PAIN DESCRIPTION - ORIENTATION
ORIENTATION: LOWER

## 2024-11-23 ASSESSMENT — PAIN DESCRIPTION - LOCATION
LOCATION: BACK
LOCATION: BACK;HIP
LOCATION: BACK

## 2024-11-23 ASSESSMENT — PAIN DESCRIPTION - DESCRIPTORS
DESCRIPTORS: ACHING
DESCRIPTORS: DISCOMFORT
DESCRIPTORS: ACHING;SORE

## 2024-11-23 ASSESSMENT — PAIN - FUNCTIONAL ASSESSMENT
PAIN_FUNCTIONAL_ASSESSMENT: ACTIVITIES ARE NOT PREVENTED

## 2024-11-23 ASSESSMENT — PAIN DESCRIPTION - ONSET
ONSET: ON-GOING
ONSET: ON-GOING

## 2024-11-23 ASSESSMENT — PAIN SCALES - WONG BAKER: WONGBAKER_NUMERICALRESPONSE: NO HURT

## 2024-11-23 NOTE — PLAN OF CARE
Problem: Chronic Conditions and Co-morbidities  Goal: Patient's chronic conditions and co-morbidity symptoms are monitored and maintained or improved  Outcome: Progressing     Problem: Discharge Planning  Goal: Discharge to home or other facility with appropriate resources  Outcome: Progressing  Flowsheets (Taken 11/22/2024 1137 by Rozina Tejeda, RN)  Discharge to home or other facility with appropriate resources: Identify barriers to discharge with patient and caregiver     Problem: Safety - Adult  Goal: Free from fall injury  Outcome: Progressing     Problem: Pain  Goal: Verbalizes/displays adequate comfort level or baseline comfort level  Outcome: Progressing     Problem: Nutrition Deficit:  Goal: Optimize nutritional status  11/22/2024 2152 by Gunner Dickson, RN  Outcome: Progressing  11/22/2024 1247 by Cora Manzano RD  Flowsheets (Taken 11/22/2024 1247)  Nutrient intake appropriate for improving, restoring, or maintaining nutritional needs:   Assess nutritional status and recommend course of action   Monitor oral intake, labs, and treatment plans   Recommend appropriate diets, oral nutritional supplements, and vitamin/mineral supplements

## 2024-11-24 LAB — BACTERIA BLD CULT: NORMAL

## 2024-11-24 PROCEDURE — 97530 THERAPEUTIC ACTIVITIES: CPT

## 2024-11-24 PROCEDURE — 97116 GAIT TRAINING THERAPY: CPT

## 2024-11-24 PROCEDURE — 1200000002 HC SEMI PRIVATE SWING BED

## 2024-11-24 PROCEDURE — 6370000000 HC RX 637 (ALT 250 FOR IP)

## 2024-11-24 PROCEDURE — 6370000000 HC RX 637 (ALT 250 FOR IP): Performed by: INTERNAL MEDICINE

## 2024-11-24 RX ADMIN — OXYCODONE 5 MG: 5 TABLET ORAL at 06:27

## 2024-11-24 RX ADMIN — LATANOPROST 1 DROP: 50 SOLUTION OPHTHALMIC at 20:08

## 2024-11-24 RX ADMIN — ACETAMINOPHEN 650 MG: 325 TABLET ORAL at 03:59

## 2024-11-24 RX ADMIN — OXYCODONE 5 MG: 5 TABLET ORAL at 13:58

## 2024-11-24 RX ADMIN — CALCIUM CARBONATE 500 MG: 500 TABLET, CHEWABLE ORAL at 01:05

## 2024-11-24 RX ADMIN — Medication 1000 UNITS: at 09:21

## 2024-11-24 RX ADMIN — AMLODIPINE BESYLATE 2.5 MG: 2.5 TABLET ORAL at 09:21

## 2024-11-24 RX ADMIN — CEPHALEXIN 500 MG: 500 CAPSULE ORAL at 13:58

## 2024-11-24 RX ADMIN — ACETAMINOPHEN 650 MG: 325 TABLET ORAL at 09:20

## 2024-11-24 RX ADMIN — Medication 400 MG: at 09:21

## 2024-11-24 RX ADMIN — LEFLUNOMIDE 20 MG: 10 TABLET ORAL at 09:17

## 2024-11-24 RX ADMIN — ACETAMINOPHEN 650 MG: 325 TABLET ORAL at 22:04

## 2024-11-24 RX ADMIN — Medication 3 MG: at 22:07

## 2024-11-24 RX ADMIN — ATORVASTATIN CALCIUM 40 MG: 40 TABLET, FILM COATED ORAL at 09:21

## 2024-11-24 RX ADMIN — CALCIUM CARBONATE 500 MG: 500 TABLET, CHEWABLE ORAL at 22:04

## 2024-11-24 RX ADMIN — CALCIUM CARBONATE 500 MG: 500 TABLET, CHEWABLE ORAL at 13:58

## 2024-11-24 RX ADMIN — CEPHALEXIN 500 MG: 500 CAPSULE ORAL at 09:21

## 2024-11-24 RX ADMIN — CEPHALEXIN 500 MG: 500 CAPSULE ORAL at 20:06

## 2024-11-24 RX ADMIN — CARVEDILOL 6.25 MG: 3.12 TABLET, FILM COATED ORAL at 09:20

## 2024-11-24 RX ADMIN — OXYCODONE 5 MG: 5 TABLET ORAL at 20:07

## 2024-11-24 RX ADMIN — BISACODYL 5 MG: 5 TABLET, COATED ORAL at 09:21

## 2024-11-24 RX ADMIN — OXYCODONE 5 MG: 5 TABLET ORAL at 01:05

## 2024-11-24 RX ADMIN — ACETAMINOPHEN 650 MG: 325 TABLET ORAL at 16:39

## 2024-11-24 RX ADMIN — OXYCODONE 5 MG: 5 TABLET ORAL at 09:20

## 2024-11-24 ASSESSMENT — PAIN SCALES - GENERAL
PAINLEVEL_OUTOF10: 2
PAINLEVEL_OUTOF10: 8
PAINLEVEL_OUTOF10: 7
PAINLEVEL_OUTOF10: 6
PAINLEVEL_OUTOF10: 5
PAINLEVEL_OUTOF10: 7
PAINLEVEL_OUTOF10: 6
PAINLEVEL_OUTOF10: 2
PAINLEVEL_OUTOF10: 4
PAINLEVEL_OUTOF10: 4

## 2024-11-24 ASSESSMENT — PAIN - FUNCTIONAL ASSESSMENT
PAIN_FUNCTIONAL_ASSESSMENT: ACTIVITIES ARE NOT PREVENTED

## 2024-11-24 ASSESSMENT — PAIN DESCRIPTION - LOCATION
LOCATION: BACK
LOCATION: INCISION
LOCATION: BACK
LOCATION: BACK
LOCATION: BACK;INCISION

## 2024-11-24 ASSESSMENT — PAIN DESCRIPTION - DESCRIPTORS
DESCRIPTORS: DISCOMFORT
DESCRIPTORS: TENDER
DESCRIPTORS: DISCOMFORT;ACHING
DESCRIPTORS: DISCOMFORT
DESCRIPTORS: ACHING

## 2024-11-24 ASSESSMENT — PAIN DESCRIPTION - ORIENTATION: ORIENTATION: LOWER

## 2024-11-25 LAB
ANION GAP SERPL CALCULATED.3IONS-SCNC: 9 MEQ/L (ref 9–15)
BASOPHILS # BLD: 0 K/UL (ref 0–0.1)
BASOPHILS NFR BLD: 0.5 % (ref 0.1–1.2)
BUN SERPL-MCNC: 17 MG/DL (ref 8–23)
CALCIUM SERPL-MCNC: 10.1 MG/DL (ref 8.5–9.9)
CHLORIDE SERPL-SCNC: 100 MEQ/L (ref 95–107)
CO2 SERPL-SCNC: 28 MEQ/L (ref 20–31)
CREAT SERPL-MCNC: 0.63 MG/DL (ref 0.5–0.9)
EOSINOPHIL # BLD: 0.1 K/UL (ref 0–0.4)
EOSINOPHIL NFR BLD: 1.3 % (ref 0.7–5.8)
ERYTHROCYTE [DISTWIDTH] IN BLOOD BY AUTOMATED COUNT: 11.4 % (ref 11.7–14.4)
GLUCOSE SERPL-MCNC: 98 MG/DL (ref 70–99)
HCT VFR BLD AUTO: 34.4 % (ref 37–47)
HGB BLD-MCNC: 11.6 G/DL (ref 11.2–15.7)
IMM GRANULOCYTES # BLD: 0 K/UL
IMM GRANULOCYTES NFR BLD: 0.3 %
LYMPHOCYTES # BLD: 1.6 K/UL (ref 1.2–3.7)
LYMPHOCYTES NFR BLD: 40 %
MCH RBC QN AUTO: 31.7 PG (ref 25.6–32.2)
MCHC RBC AUTO-ENTMCNC: 33.7 % (ref 32.2–35.5)
MCV RBC AUTO: 94 FL (ref 79.4–94.8)
MONOCYTES # BLD: 0.5 K/UL (ref 0.2–0.9)
MONOCYTES NFR BLD: 12.9 % (ref 4.7–12.5)
NEUTROPHILS # BLD: 1.8 K/UL (ref 1.6–6.1)
NEUTS SEG NFR BLD: 45 % (ref 34–71.1)
PLATELET # BLD AUTO: 336 K/UL (ref 182–369)
POTASSIUM SERPL-SCNC: 3.9 MEQ/L (ref 3.4–4.9)
RBC # BLD AUTO: 3.66 M/UL (ref 3.93–5.22)
SODIUM SERPL-SCNC: 137 MEQ/L (ref 135–144)
WBC # BLD AUTO: 4 K/UL (ref 4–10)

## 2024-11-25 PROCEDURE — 6370000000 HC RX 637 (ALT 250 FOR IP)

## 2024-11-25 PROCEDURE — 36415 COLL VENOUS BLD VENIPUNCTURE: CPT

## 2024-11-25 PROCEDURE — 97110 THERAPEUTIC EXERCISES: CPT

## 2024-11-25 PROCEDURE — 1200000002 HC SEMI PRIVATE SWING BED

## 2024-11-25 PROCEDURE — 6370000000 HC RX 637 (ALT 250 FOR IP): Performed by: INTERNAL MEDICINE

## 2024-11-25 PROCEDURE — 97530 THERAPEUTIC ACTIVITIES: CPT

## 2024-11-25 PROCEDURE — 80048 BASIC METABOLIC PNL TOTAL CA: CPT

## 2024-11-25 PROCEDURE — 97535 SELF CARE MNGMENT TRAINING: CPT

## 2024-11-25 PROCEDURE — 85025 COMPLETE CBC W/AUTO DIFF WBC: CPT

## 2024-11-25 PROCEDURE — 97116 GAIT TRAINING THERAPY: CPT

## 2024-11-25 RX ADMIN — OXYCODONE 5 MG: 5 TABLET ORAL at 20:49

## 2024-11-25 RX ADMIN — CEPHALEXIN 500 MG: 500 CAPSULE ORAL at 15:19

## 2024-11-25 RX ADMIN — CALCIUM CARBONATE 500 MG: 500 TABLET, CHEWABLE ORAL at 12:08

## 2024-11-25 RX ADMIN — OXYCODONE 5 MG: 5 TABLET ORAL at 15:19

## 2024-11-25 RX ADMIN — AMLODIPINE BESYLATE 2.5 MG: 2.5 TABLET ORAL at 07:42

## 2024-11-25 RX ADMIN — ACETAMINOPHEN 650 MG: 325 TABLET ORAL at 12:07

## 2024-11-25 RX ADMIN — BISACODYL 5 MG: 5 TABLET, COATED ORAL at 07:42

## 2024-11-25 RX ADMIN — Medication 1000 UNITS: at 07:42

## 2024-11-25 RX ADMIN — CEPHALEXIN 500 MG: 500 CAPSULE ORAL at 20:45

## 2024-11-25 RX ADMIN — CARVEDILOL 6.25 MG: 3.12 TABLET, FILM COATED ORAL at 07:42

## 2024-11-25 RX ADMIN — LATANOPROST 1 DROP: 50 SOLUTION OPHTHALMIC at 20:48

## 2024-11-25 RX ADMIN — OXYCODONE 5 MG: 5 TABLET ORAL at 02:52

## 2024-11-25 RX ADMIN — LEFLUNOMIDE 20 MG: 10 TABLET ORAL at 07:42

## 2024-11-25 RX ADMIN — ACETAMINOPHEN 650 MG: 325 TABLET ORAL at 20:45

## 2024-11-25 RX ADMIN — OXYCODONE 5 MG: 5 TABLET ORAL at 12:07

## 2024-11-25 RX ADMIN — CEPHALEXIN 500 MG: 500 CAPSULE ORAL at 07:42

## 2024-11-25 RX ADMIN — ACETAMINOPHEN 650 MG: 325 TABLET ORAL at 15:18

## 2024-11-25 RX ADMIN — ACETAMINOPHEN 650 MG: 325 TABLET ORAL at 02:53

## 2024-11-25 RX ADMIN — ATORVASTATIN CALCIUM 40 MG: 40 TABLET, FILM COATED ORAL at 07:42

## 2024-11-25 RX ADMIN — OXYCODONE 5 MG: 5 TABLET ORAL at 07:41

## 2024-11-25 RX ADMIN — Medication 400 MG: at 07:42

## 2024-11-25 ASSESSMENT — PAIN SCALES - GENERAL
PAINLEVEL_OUTOF10: 7
PAINLEVEL_OUTOF10: 3
PAINLEVEL_OUTOF10: 3
PAINLEVEL_OUTOF10: 4

## 2024-11-25 ASSESSMENT — PAIN DESCRIPTION - DESCRIPTORS
DESCRIPTORS: ACHING
DESCRIPTORS: SHARP

## 2024-11-25 ASSESSMENT — PAIN DESCRIPTION - ORIENTATION
ORIENTATION: LOWER

## 2024-11-25 ASSESSMENT — PAIN DESCRIPTION - LOCATION
LOCATION: BUTTOCKS
LOCATION: BACK

## 2024-11-25 ASSESSMENT — PAIN - FUNCTIONAL ASSESSMENT: PAIN_FUNCTIONAL_ASSESSMENT: PREVENTS OR INTERFERES SOME ACTIVE ACTIVITIES AND ADLS

## 2024-11-25 NOTE — DISCHARGE INSTR - COC
ABDULKADIR Villa on 11/25/24 at 3:05 PM EST    PHYSICIAN SECTION    Prognosis: Good    Condition at Discharge: Stable    Rehab Potential (if transferring to Rehab): Good    Recommended Labs or Other Treatments After Discharge:     Physician Certification: I certify the above information and transfer of Rosemarie Grant  is necessary for the continuing treatment of the diagnosis listed and that she requires Home Care for greater 30 days.     Update Admission H&P: No change in H&P    PHYSICIAN SIGNATURE:  Electronically signed by Brent Barfield MD on 11/27/24 at 8:02 AM EST

## 2024-11-26 PROCEDURE — 97116 GAIT TRAINING THERAPY: CPT

## 2024-11-26 PROCEDURE — 1200000002 HC SEMI PRIVATE SWING BED

## 2024-11-26 PROCEDURE — 6370000000 HC RX 637 (ALT 250 FOR IP): Performed by: INTERNAL MEDICINE

## 2024-11-26 PROCEDURE — 6370000000 HC RX 637 (ALT 250 FOR IP)

## 2024-11-26 PROCEDURE — 97110 THERAPEUTIC EXERCISES: CPT

## 2024-11-26 PROCEDURE — 97140 MANUAL THERAPY 1/> REGIONS: CPT

## 2024-11-26 RX ADMIN — Medication 1000 UNITS: at 08:58

## 2024-11-26 RX ADMIN — CEPHALEXIN 500 MG: 500 CAPSULE ORAL at 08:57

## 2024-11-26 RX ADMIN — OXYCODONE 5 MG: 5 TABLET ORAL at 04:44

## 2024-11-26 RX ADMIN — ACETAMINOPHEN 650 MG: 325 TABLET ORAL at 08:58

## 2024-11-26 RX ADMIN — ACETAMINOPHEN 650 MG: 325 TABLET ORAL at 15:54

## 2024-11-26 RX ADMIN — ACETAMINOPHEN 650 MG: 325 TABLET ORAL at 20:45

## 2024-11-26 RX ADMIN — AMLODIPINE BESYLATE 2.5 MG: 2.5 TABLET ORAL at 08:57

## 2024-11-26 RX ADMIN — OXYCODONE 5 MG: 5 TABLET ORAL at 09:02

## 2024-11-26 RX ADMIN — BISACODYL 5 MG: 5 TABLET, COATED ORAL at 08:57

## 2024-11-26 RX ADMIN — ATORVASTATIN CALCIUM 40 MG: 40 TABLET, FILM COATED ORAL at 08:58

## 2024-11-26 RX ADMIN — CARVEDILOL 6.25 MG: 3.12 TABLET, FILM COATED ORAL at 08:57

## 2024-11-26 RX ADMIN — OXYCODONE 5 MG: 5 TABLET ORAL at 20:45

## 2024-11-26 RX ADMIN — OXYCODONE 5 MG: 5 TABLET ORAL at 15:54

## 2024-11-26 RX ADMIN — CALCIUM CARBONATE 500 MG: 500 TABLET, CHEWABLE ORAL at 22:06

## 2024-11-26 RX ADMIN — CALCIUM CARBONATE 500 MG: 500 TABLET, CHEWABLE ORAL at 13:33

## 2024-11-26 RX ADMIN — LATANOPROST 1 DROP: 50 SOLUTION OPHTHALMIC at 20:46

## 2024-11-26 RX ADMIN — LEFLUNOMIDE 20 MG: 10 TABLET ORAL at 08:57

## 2024-11-26 RX ADMIN — CARVEDILOL 6.25 MG: 3.12 TABLET, FILM COATED ORAL at 16:06

## 2024-11-26 RX ADMIN — CEPHALEXIN 500 MG: 500 CAPSULE ORAL at 20:45

## 2024-11-26 RX ADMIN — Medication 400 MG: at 08:58

## 2024-11-26 RX ADMIN — CEPHALEXIN 500 MG: 500 CAPSULE ORAL at 13:33

## 2024-11-26 ASSESSMENT — PAIN DESCRIPTION - ORIENTATION
ORIENTATION: LOWER
ORIENTATION: LOWER
ORIENTATION: LOWER;LEFT;RIGHT
ORIENTATION: LOWER

## 2024-11-26 ASSESSMENT — PAIN SCALES - GENERAL
PAINLEVEL_OUTOF10: 6
PAINLEVEL_OUTOF10: 7
PAINLEVEL_OUTOF10: 6

## 2024-11-26 ASSESSMENT — PAIN DESCRIPTION - PAIN TYPE
TYPE: SURGICAL PAIN
TYPE: SURGICAL PAIN

## 2024-11-26 ASSESSMENT — PAIN DESCRIPTION - DESCRIPTORS
DESCRIPTORS: ACHING;SORE
DESCRIPTORS: ACHING;SHARP
DESCRIPTORS: ACHING;SORE

## 2024-11-26 ASSESSMENT — PAIN DESCRIPTION - LOCATION
LOCATION: BACK
LOCATION: BACK
LOCATION: BACK;HIP
LOCATION: BACK
LOCATION: BACK

## 2024-11-26 ASSESSMENT — PAIN - FUNCTIONAL ASSESSMENT: PAIN_FUNCTIONAL_ASSESSMENT: PREVENTS OR INTERFERES SOME ACTIVE ACTIVITIES AND ADLS

## 2024-11-26 NOTE — PLAN OF CARE
Problem: Chronic Conditions and Co-morbidities  Goal: Patient's chronic conditions and co-morbidity symptoms are monitored and maintained or improved  11/26/2024 0229 by Sabina Mejia RN  Outcome: Progressing  11/26/2024 0228 by Sabina Mejia RN  Outcome: Progressing     Problem: Discharge Planning  Goal: Discharge to home or other facility with appropriate resources  11/26/2024 0229 by Sabina Mejia RN  Outcome: Progressing  11/26/2024 0228 by Sabina Mejia RN  Outcome: Progressing     Problem: Safety - Adult  Goal: Free from fall injury  11/26/2024 0229 by Sabina Mejia RN  Outcome: Progressing  11/26/2024 0228 by Sabina Mejia RN  Outcome: Progressing     Problem: Pain  Goal: Verbalizes/displays adequate comfort level or baseline comfort level  11/26/2024 0229 by Sabina Mejia RN  Outcome: Progressing  11/26/2024 0228 by Sabina Mejia RN  Outcome: Progressing     Problem: Nutrition Deficit:  Goal: Optimize nutritional status  11/26/2024 0229 by Sabina Mejia RN  Outcome: Progressing  11/26/2024 0228 by Sabina Mejia RN  Outcome: Progressing

## 2024-11-27 VITALS
OXYGEN SATURATION: 100 % | BODY MASS INDEX: 30.24 KG/M2 | TEMPERATURE: 98.4 F | WEIGHT: 181.5 LBS | SYSTOLIC BLOOD PRESSURE: 133 MMHG | HEIGHT: 65 IN | HEART RATE: 72 BPM | DIASTOLIC BLOOD PRESSURE: 69 MMHG | RESPIRATION RATE: 18 BRPM

## 2024-11-27 PROCEDURE — 6370000000 HC RX 637 (ALT 250 FOR IP)

## 2024-11-27 PROCEDURE — 6370000000 HC RX 637 (ALT 250 FOR IP): Performed by: INTERNAL MEDICINE

## 2024-11-27 RX ORDER — CEPHALEXIN 500 MG/1
500 CAPSULE ORAL 3 TIMES DAILY
Qty: 21 CAPSULE | Refills: 0 | Status: SHIPPED | OUTPATIENT
Start: 2024-11-27 | End: 2024-12-04

## 2024-11-27 RX ADMIN — Medication 1000 UNITS: at 07:49

## 2024-11-27 RX ADMIN — LEFLUNOMIDE 20 MG: 10 TABLET ORAL at 07:49

## 2024-11-27 RX ADMIN — AMLODIPINE BESYLATE 2.5 MG: 2.5 TABLET ORAL at 07:49

## 2024-11-27 RX ADMIN — ACETAMINOPHEN 650 MG: 325 TABLET ORAL at 03:45

## 2024-11-27 RX ADMIN — CEPHALEXIN 500 MG: 500 CAPSULE ORAL at 07:49

## 2024-11-27 RX ADMIN — OXYCODONE 5 MG: 5 TABLET ORAL at 07:49

## 2024-11-27 RX ADMIN — BISACODYL 5 MG: 5 TABLET, COATED ORAL at 07:49

## 2024-11-27 RX ADMIN — ATORVASTATIN CALCIUM 40 MG: 40 TABLET, FILM COATED ORAL at 07:49

## 2024-11-27 RX ADMIN — CARVEDILOL 6.25 MG: 3.12 TABLET, FILM COATED ORAL at 07:48

## 2024-11-27 RX ADMIN — Medication 400 MG: at 07:48

## 2024-11-27 ASSESSMENT — PAIN - FUNCTIONAL ASSESSMENT: PAIN_FUNCTIONAL_ASSESSMENT: ACTIVITIES ARE NOT PREVENTED

## 2024-11-27 ASSESSMENT — PAIN DESCRIPTION - DESCRIPTORS: DESCRIPTORS: ACHING;DISCOMFORT

## 2024-11-27 ASSESSMENT — PAIN SCALES - GENERAL
PAINLEVEL_OUTOF10: 7
PAINLEVEL_OUTOF10: 5
PAINLEVEL_OUTOF10: 7

## 2024-11-27 ASSESSMENT — PAIN DESCRIPTION - LOCATION
LOCATION: BACK
LOCATION: BACK

## 2024-11-27 ASSESSMENT — PAIN SCALES - WONG BAKER
WONGBAKER_NUMERICALRESPONSE: NO HURT
WONGBAKER_NUMERICALRESPONSE: NO HURT

## 2024-11-27 ASSESSMENT — PAIN DESCRIPTION - ORIENTATION: ORIENTATION: LOWER

## 2024-11-27 NOTE — DISCHARGE SUMMARY
Absolute 1.1 (H) 0.2 - 0.9 K/uL    Eosinophils Absolute 0.1 0.0 - 0.4 K/uL    Basophils Absolute 0.0 0.0 - 0.1 K/uL   Basic Metabolic Panel   Result Value Ref Range    Sodium 140 135 - 144 mEq/L    Potassium 3.7 3.4 - 4.9 mEq/L    Chloride 101 95 - 107 mEq/L    CO2 29 20 - 31 mEq/L    Anion Gap 10 9 - 15 mEq/L    Glucose 98 70 - 99 mg/dL    BUN 14 8 - 23 mg/dL    Creatinine 0.66 0.50 - 0.90 mg/dL    Est, Glom Filt Rate >90.0 >60    Calcium 9.3 8.5 - 9.9 mg/dL   CBC with Auto Differential   Result Value Ref Range    WBC 4.0 4.0 - 10.0 K/uL    RBC 3.66 (L) 3.93 - 5.22 M/uL    Hemoglobin 11.6 11.2 - 15.7 g/dL    Hematocrit 34.4 (L) 37.0 - 47.0 %    MCV 94.0 79.4 - 94.8 fL    MCH 31.7 25.6 - 32.2 pg    MCHC 33.7 32.2 - 35.5 %    RDW 11.4 (L) 11.7 - 14.4 %    Platelets 336 182 - 369 K/uL    Neutrophils % 45.0 34.0 - 71.1 %    Immature Granulocytes % 0.3 %    Lymphocytes % 40.0 %    Monocytes % 12.9 (H) 4.7 - 12.5 %    Eosinophils % 1.3 0.7 - 5.8 %    Basophils % 0.5 0.1 - 1.2 %    Neutrophils Absolute 1.8 1.6 - 6.1 K/uL    Immature Granulocytes # 0.0 K/uL    Lymphocytes Absolute 1.6 1.2 - 3.7 K/uL    Monocytes Absolute 0.5 0.2 - 0.9 K/uL    Eosinophils Absolute 0.1 0.0 - 0.4 K/uL    Basophils Absolute 0.0 0.0 - 0.1 K/uL   Basic Metabolic Panel   Result Value Ref Range    Sodium 137 135 - 144 mEq/L    Potassium 3.9 3.4 - 4.9 mEq/L    Chloride 100 95 - 107 mEq/L    CO2 28 20 - 31 mEq/L    Anion Gap 9 9 - 15 mEq/L    Glucose 98 70 - 99 mg/dL    BUN 17 8 - 23 mg/dL    Creatinine 0.63 0.50 - 0.90 mg/dL    Est, Glom Filt Rate >90.0 >60    Calcium 10.1 (H) 8.5 - 9.9 mg/dL       Diet:  ADULT DIET; Regular  ADULT ORAL NUTRITION SUPPLEMENT; Breakfast, Dinner; Wound Healing Oral Supplement    Activity:  Activity as tolerated (Patient may move about with assist as indicated or with supervision.)    Follow-up:  in 1 weeks with Sonia Snow MD,       Disposition: home    Condition: Stable    Time Spent: 55

## 2024-11-27 NOTE — PROGRESS NOTES
Hospitalist Progress Note      PCP: Sonia Snow MD    Date of Admission: 11/20/2024    Chief Complaint: pain    Subjective: patient awake/alert     Medications:  Reviewed    Infusion Medications    sodium chloride       Scheduled Medications    oxyCODONE  5 mg Oral TID    atorvastatin  40 mg Oral Daily    magnesium oxide  400 mg Oral Daily    Vitamin D  1,000 Units Oral Daily    carvedilol  6.25 mg Oral BID WC    amLODIPine  2.5 mg Oral Daily    latanoprost  1 drop Both Eyes Nightly    leflunomide  20 mg Oral Daily    bisacodyl  5 mg Oral Daily    acetaminophen  650 mg Oral 4 times per day    cephALEXin  500 mg Oral TID     PRN Meds: oxyCODONE, sodium chloride flush, sodium chloride, ondansetron, magnesium hydroxide, polyethylene glycol, calcium carbonate, melatonin    No intake or output data in the 24 hours ending 11/26/24 0955    Exam:    BP (!) 140/64   Pulse 71   Temp 98.7 °F (37.1 °C) (Oral)   Resp 16   Ht 1.651 m (5' 5\")   Wt 82.3 kg (181 lb 8 oz)   LMP  (LMP Unknown)   SpO2 98%   BMI 30.20 kg/m²     General appearance: No apparent distress, appears stated age and cooperative.  HEENT: Pupils equal, round, and reactive to light. Conjunctivae/corneas clear.  Neck: Supple, with full range of motion. No jugular venous distention. Trachea midline.  Respiratory:  Normal respiratory effort. Clear to auscultation, bilaterally without Rales/Wheezes/Rhonchi.  Cardiovascular: Regular rate and rhythm with normal S1/S2 without murmurs, rubs or gallops.  Abdomen: Soft, non-tender, non-distended with normal bowel sounds.  Musculoskeletal: No clubbing, cyanosis or edema bilaterally.  Full range of motion without deformity.  Skin: lower back stable postoperative scar with small opening in upper pole with clear fluids discharge, no redness around it   Neurologic:  Neurovascularly intact without any focal sensory/motor deficits. Cranial nerves: II-XII intact, grossly non-focal.  Psychiatric: Alert and 
18:00 Pt arrived via ambulance from  to room 246 subacute. Alert and oriented x 4, VSS, assessment completed.   
Comprehensive Nutrition Assessment    Type and Reason for Visit:  Initial    Nutrition Recommendations/Plan:   Continue regular diet as ordered  PO >75% Meals  Start wound healing supplement twice daily     Malnutrition Assessment:  Malnutrition Status:  No malnutrition (11/22/24 1246)        Nutrition Assessment:    Nutritional status presents as adequate. No s/s of malnutrition. Pt reports appetite is slightly down from normal. Agreable to wound  healing supplement twice daily to assist in surgical wound healing.    Nutrition Related Findings:    \"The patient is a 74 y.o. female with PMH of rheumatoid arthritis, hypertension, hyperlipidemia and spinal stenosis.  She underwent lumbar laminectomy by Dr. Hilton on 11/15.  Subsequently patient was admitted to the skilled unit at [facility] for inpatient physical and Occupational Therapy.  Patient reports having pain.  Her pain is usually around 4-5 on a scale of 10 but occasionally goes up to 8-9 on a scale of 10.  Patient stated that she has to ask for pain medication ahead of time so does not get bad enough to reach 8 on a scale of 10.  No fever or chills.  No abdominal pain, nausea vomiting, diarrhea, dysuria hematuria.\"  Admit weight 181.5# on 11/20- weight overall appears to be WNL for pt per wt hx. Diet is regular, PO % Meals. Meds reviewed. Labs reviewed 11/21/24- no nutrition related concerns. Skin- surgical area to lower back. Last BM per EMR 11/21.  Pt reports slight decrease in normal appetite. Feeds self independently, no chewing/swallowing difficulty. Wound Type: Surgical Incision (lower back)       Current Nutrition Intake & Therapies:    Average Meal Intake: %  Average Supplements Intake: None Ordered  ADULT DIET; Regular  ADULT ORAL NUTRITION SUPPLEMENT; Breakfast, Dinner; Wound Healing Oral Supplement    Anthropometric Measures:  Height: 165.1 cm (5' 5\")  Ideal Body Weight (IBW): 125 lbs (57 kg)    Admission Body Weight: 82.3 kg (181 lb 8 
Discharge instructions complete. All questions and concerns answered. Family member at bedside to transfer patient home. Home Health care to follow up with patient   
Dressing changed to lower back incision complete. Moderated amount of serosanguinous drainage noted. Dressings have been continue to be saturated with drainage multiple times a day. Dr. Barfield updated.  
Facility/Department: [facility] SUBACUTE UNIT  Daily Treatment Note  NAME: Rosemarie Grant  : 1950  MRN: [id number]    Date of Service: 2024    Discharge Recommendations:  Continue to assess pending progress, Home with Home health OT (vs outpatient PT)         Patient Diagnosis(es): Spinal surgery    Assessment   Assessment: Pt presents in chair and appears uncomfortable, pt shifting side to side and states her back is hurting. Pt is already washed and dressed for the day, discussed showering with pt who stated she would not feel comfortable with that until Dr. Hilton has taken a recent look at her incision. Pt's concerns are that she has an open area on her back and she's worried about infection. Relayed pt's concerns to RN and nursing supervisor who stated they would follow up with pt. P was fatigued and in pain, agreeable to light BUE ther ex and standing trials. Will follow up with ADL tomorrow. Pt left in chair with all needs upon OT departure.  Activity Tolerance: Patient limited by pain  Discharge Recommendations: Continue to assess pending progress;Home with Home health OT (vs outpatient PT)     Plan  Occupational Therapy Plan  Times Per Week: 4-7  Times Per Day: Once a day  Current Treatment Recommendations: Functional mobility training;Endurance training;Patient/Caregiver education & training;Equipment evaluation, education, & procurement;Safety education & training;Self-Care / ADL;Home management training;Balance training;Pain management;Neuromuscular re-education;Modalities    Restrictions   Spinal precautions    Subjective  Subjective  Subjective: \"I'm worried about getting an infection.\"  Pain: 10 back pain  Orientation  Overall Orientation Status: Within Normal Limits  Orientation Level: Oriented to place;Oriented to time;Oriented to situation;Oriented to person  Pain: 10 back pain  Cognition  Overall Cognitive Status: WNL  Cognition Comment: Alert, pleasant, cooperative       Objective  Bed 
Facility/Department: [facility] SUBACUTE UNIT  Daily Treatment Note  NAME: Rosemarie Grant  : 1950  MRN: [id number]    Date of Service: 2024    Discharge Recommendations:  Continue to assess pending progress, Home with Home health OT         Patient Diagnosis(es): Spinal surgery     Assessment   Assessment: Pt is in bed and is agreeable to HEP education in preparation for d/c tomorrow. Discussed with pt how to safely exercise with theraband and free weights, provided recommendations for posture and technique to reduce back pain. Pt had all needs within reach upon OT departure.  Activity Tolerance: Patient tolerated treatment well  Discharge Recommendations: Continue to assess pending progress;Home with Home health OT     Plan  Occupational Therapy Plan  Times Per Week: 4-7  Times Per Day: Once a day  Current Treatment Recommendations: Functional mobility training;Endurance training;Patient/Caregiver education & training;Equipment evaluation, education, & procurement;Safety education & training;Self-Care / ADL;Home management training;Balance training;Pain management;Neuromuscular re-education;Modalities    Restrictions   Spinal precautions    Subjective  Subjective  Subjective: \"I'm going home tomorrow and I have no concerns, I feel ready.\"  Pain: 6/10 back pain  Orientation  Overall Orientation Status: Within Normal Limits  Orientation Level: Oriented X4  Pain: 6/10 back pain  Cognition  Overall Cognitive Status: WNL  Cognition Comment: Alert, pleasant, cooperative       Objective  Bed Mobility Training  Bed Mobility Training: Yes  Overall Level of Assistance: Modified independent  Interventions: Safety awareness training;Verbal cues  Rolling: Modified independent  Supine to Sit: Modified independent  Sit to Supine: Modified independent  Scooting: Modified independent  Balance  Sitting: Intact  Standing: Intact;With support (FWW Mod I)  Standing - Static: Good  Standing - Dynamic: Fair  Transfer 
Facility/Department: [facility] SUBACUTE UNIT  Daily Treatment Note  NAME: Rosemarie Grant  : 1950  MRN: [id number]    Date of Service: 2024    Discharge Recommendations:  Continue to assess pending progress, Home with Home health OT         Patient Diagnosis(es): Spinal surgery     Assessment   Assessment: Pt presents in bed and is finished with breakfast, pt motivated to completed UB/LB bathing and dressing. Pt used the toilet then sat in-front of sink for ADLs. Pt was thorough and needed extra time, she demo's cross-leg tech to reach LB. Pt is progressing towards goals and states she feels ready for d/c this week. Pt in chair upon OT departure with all needs within reach.  Activity Tolerance: Patient tolerated treatment well  Discharge Recommendations: Continue to assess pending progress;Home with Home health OT     Plan  Occupational Therapy Plan  Times Per Week: 4-7  Times Per Day: Once a day  Current Treatment Recommendations: Functional mobility training;Endurance training;Patient/Caregiver education & training;Equipment evaluation, education, & procurement;Safety education & training;Self-Care / ADL;Home management training;Balance training;Pain management;Neuromuscular re-education;Modalities    Restrictions   Lumbar spinal precautions    Subjective  Subjective  Subjective: \"My back is okay, I can stand longer than before.\"  Pain: 5/10 back pain  Orientation  Overall Orientation Status: Within Normal Limits  Orientation Level: Oriented X4  Pain: 5/10 back pain  Cognition  Overall Cognitive Status: WNL  Cognition Comment: Alert, pleasant, cooperative       Objective  Bed Mobility Training  Bed Mobility Training: Yes  Overall Level of Assistance: Supervision  Supine to Sit: Supervision  Sit to Supine: Supervision  Scooting: Supervision  Balance  Sitting: Intact  Standing: Intact;With support (FWW used SBA)  Transfer Training  Transfer Training: Yes  Overall Level of Assistance: Modified 
Facility/Department: [facility] MED SURG UNIT  Daily Treatment Note  NAME: Rosemarie Grant  : 1950  MRN: [id number]    Date of Service: 2024    Discharge Recommendations:  Home with Home health PT, Outpatient PT, Continue to assess pending progress        Patient Diagnosis(es): L4-S1 decompression and L5-S1 discectomy; difficulty walking/ weakness    Assessment  Assessment: 74 year old female s/p spinal surgery with limited activity tolerance and functional mobility due to pain and weakness. Pt motivated to improve and return home, education reminders on good spinal positioning while healing post op with functional mobility.  Activity Tolerance: Patient limited by pain    Plan  Physical Therapy Plan  General Plan: 5-7 times per week (1-2x a day)  Current Treatment Recommendations: Strengthening;ROM;Balance training;Functional mobility training;Transfer training;Gait training;Stair training;Neuromuscular re-education;Patient/Caregiver education & training;Equipment evaluation, education, & procurement;Therapeutic activities;Home exercise program;Modalities;Safety education & training    Restrictions  Restrictions/Precautions  Restrictions/Precautions: Fall Risk  Required Braces or Orthoses?: No  Implants present? : Metal implants  Position Activity Restriction  Other position/activity restrictions: Left and bilateral L4-5-S1 decompression left L5-S1 discectomy 11/15/24 with Dr. Hilton     Subjective   Subjective  Subjective: pt in bed upon arrival, agreeable to therapy  Pain: 6/10 back pain  Orientation  Overall Orientation Status: Within Normal Limits  Orientation Level: Oriented to place;Oriented to time;Oriented to situation;Oriented to person  Cognition  Overall Cognitive Status: WNL  Cognition Comment: Alert, pleasant, cooperative    Objective  Vitals     Bed Mobility Training  Bed Mobility Training: Yes  Overall Level of Assistance: Stand-by assistance;Additional time  Interventions: Safety awareness 
Patient is doing fairly well.  Her pain is under much better control.  No chest pain or palpitation.  No abdominal pain, nausea or vomiting.  Patient is having bowel movement.    General appearance: alert, appears stated age and cooperative, mild distress secondary to pain.  Skin: Skin color, texture, turgor normal. No rashes or lesions  HEENT: Head: Normocephalic, no lesions, without obvious abnormality.  Neck: no adenopathy, no carotid bruit, no JVD, supple, symmetrical, trachea midline, and thyroid not enlarged, symmetric, no tenderness/mass/nodules  Lungs: clear to auscultation bilaterally  Heart: regular rate and rhythm, S1, S2 normal, no murmur, click, rub or gallop  Abdomen: soft, non-tender; bowel sounds normal; no masses,  no organomegaly  Extremities: extremities normal, atraumatic, no cyanosis or edema  Neurologic: Mental status: Alert, oriented, thought content appropriate      Surgical is car is intact.  Minimal amount of blood on the gauze.  I undressed the wound.  I did not see any evidence of active bleed.  I put some pressure on the edges to see if there is any pus or blood will come out but nothing came out.     Functional impairment.  Please refer to the physical and Occupational Therapy team note for details on her functional status and treatment plan.      *Functional impairment.  Patient was admitted to the skilled section at [facility] for inpatient physical and Occupational Therapy.     *Status post lumbar discectomy completed by Dr. Daniel and on 11/15.  All surgical related issues including but not limited to monitoring for wound infection, complication, neurological status, ambulation instructions, outpatient follow-up, pain management and DVT prophylaxis to be addressed by spine surgery team.    Her bloody leakage is certainly less than before.  I could not squeeze and any blood from the surgical site when I inspected the wound.  I only see dried blood on the gauze.  If patient continues 
Physical Therapy  Facility/Department: [facility] MED SURG UNIT  Daily Treatment Note  NAME: Rosemarie Grant  : 1950  MRN: [id number]    Date of Service: 2024    Discharge Recommendations:  Home with Home health PT, Outpatient PT, Continue to assess pending progress          Assessment  Assessment: Patient tolerated session well ; good step lenth and symmetry and good balance demonstrated without the use of upper support.  Activity Tolerance: Patient tolerated treatment well    Plan  Physical Therapy Plan  General Plan: 5-7 times per week  Current Treatment Recommendations: Strengthening;ROM;Balance training;Functional mobility training;Transfer training;Gait training;Stair training;Neuromuscular re-education;Patient/Caregiver education & training;Equipment evaluation, education, & procurement;Therapeutic activities;Home exercise program;Modalities;Safety education & training    Restrictions  Restrictions/Precautions  Restrictions/Precautions: Fall Risk  Required Braces or Orthoses?: No  Implants present? : Metal implants  Position Activity Restriction  Other position/activity restrictions: Left and bilateral L4-5-S1 decompression left L5-S1 discectomy 11/15/24 with Dr. Hilton     Subjective   Subjective  Subjective: Patient reports that she had weakness in her legs prior to surgery.    Objective  Vitals     Balance  Sitting: Intact  Standing: Intact;With support  Transfer Training  Transfer Training: Yes  Overall Level of Assistance: Modified independent  Sit to Stand: Modified independent  Stand to Sit: Modified independent  Gait Training  Gait Training: Yes  Right Side Weight Bearing: As tolerated  Left Side Weight Bearing: As tolerated  Gait  Gait Training: Yes  Left Side Weight Bearing: As tolerated  Right Side Weight Bearing: As tolerated  Overall Level of Assistance: Modified independent  Distance (ft): 125 Feet  Assistive Device: Walker, rolling     PT Exercises  Static Standing Balance Exercises: Heel 
Physical Therapy  Facility/Department: [facility] MED SURG UNIT  Daily Treatment Note  NAME: Rosemarie Grant  : 1950  MRN: [id number]    Date of Service: 2024    Discharge Recommendations:  (P) Home with Home health PT, Outpatient PT, Continue to assess pending progress        Patient Diagnosis(es):  L4-S1 decompression, L5-S1 diskectomy     Assessment  Assessment: (P) Pt. tolerates well with ambulating with FWW demonstrating modified independence. Pt. able to complete stair training up to 10 steps without incident. VC's used more to focus on postural upright correction. Pt. follows with tolerating well with light LE resistance ther ex for strengthening.  Activity Tolerance: (P) Patient tolerated treatment well    Plan  Physical Therapy Plan  General Plan: (P) 5-7 times per week (1-2)  Current Treatment Recommendations: (P) Strengthening;ROM;Balance training;Functional mobility training;Transfer training;Gait training;Stair training;Neuromuscular re-education;Patient/Caregiver education & training;Equipment evaluation, education, & procurement;Therapeutic activities;Home exercise program;Modalities;Safety education & training    Restrictions  Restrictions/Precautions  Restrictions/Precautions: Fall Risk  Required Braces or Orthoses?: No  Implants present? : Metal implants  Position Activity Restriction  Other position/activity restrictions: Left and bilateral L4-5-S1 decompression left L5-S1 discectomy 11/15/24 with Dr. Hilton     Subjective   Subjective  Subjective: (P) Pt. c/o 6/10 low back.  Pain: 5/10 back pain  Orientation  Overall Orientation Status: Within Normal Limits  Orientation Level: Oriented X4  Cognition  Overall Cognitive Status: WNL  Cognition Comment: Alert, pleasant, cooperative    Objective  Vitals     Bed Mobility Training  Bed Mobility Training: No  Overall Level of Assistance: Supervision  Sitting: (P) Intact  Standing: Intact;With support (FWW used SBA)  Transfer Training  Transfer 
Physical Therapy  Facility/Department: [facility] MED SURG UNIT  Daily Treatment Note  NAME: Rosemarie Grant  : 1950  MRN: [id number]    Date of Service: 2024    Discharge Recommendations:  Home with Home health PT, Outpatient PT, Continue to assess pending progress        Patient Diagnosis(es):     Assessment  Assessment: (P) Pt. tolerates therapy intervention with ambulating to and from therapy dept and completing stand ther ex for B LE strengthening. 3 seated rest breaks needed in between ther ex due to limited stand time and low back pain with hip ROM. During gait training with FWW, pt. tends to lean forward on FWW. Pt. able to partially correct upright for limited time following vc's to correct. Assisted pt in and out of toilet post for hygiene care.  Activity Tolerance: Patient limited by pain    Plan  Physical Therapy Plan  General Plan: 5-7 times per week (1-2)  Current Treatment Recommendations: Strengthening;ROM;Balance training;Functional mobility training;Transfer training;Gait training;Stair training;Neuromuscular re-education;Patient/Caregiver education & training;Equipment evaluation, education, & procurement;Therapeutic activities;Home exercise program;Modalities;Safety education & training    Restrictions  Restrictions/Precautions  Restrictions/Precautions: Fall Risk  Required Braces or Orthoses?: No  Implants present? : Metal implants  Position Activity Restriction  Other position/activity restrictions: Left and bilateral L4-5-S1 decompression left L5-S1 discectomy 11/15/24 with Dr. Hilton     Subjective   Subjective  Subjective: Pt. up in chair c/o 6/10 in middle of low back and hips. Pt. agreeable to therapy.    Objective  Vitals     Transfer Training  Transfer Training: (P) Yes  Overall Level of Assistance: (P) Stand-by assistance  Interventions: (P) Safety awareness training;Verbal cues  Sit to Stand: (P) Stand-by assistance  Stand to Sit: (P) Stand-by assistance;Contact-guard 
Physical Therapy  Facility/Department: [facility] MED SURG UNIT  Daily Treatment Note  NAME: Rosemarie Grant  : 1950  MRN: [id number]    Date of Service: 2024    Discharge Recommendations:  Home with Home health PT, Outpatient PT, Continue to assess pending progress        Patient Diagnosis(es): L4-S1 decompression, L5-S1 diskectomy     Assessment  Assessment: (P) Pt. tolerates well with gait training with using FWW with improve upright postural coorection ~ 25% with raising walker one inch in height. Pt. demo's stead gait and controlled tsf skills with proper technique. Pt. able to complete two trials of sit to stand reps x 4 each in ~ 13 seconds. Pt. tolerates additional stand ther ex with slightly engaging posterior hip and LE muscles for strengthening. Educated CP post to reduce risk of onset soreness.  Activity Tolerance: Patient tolerated treatment well    Plan  Physical Therapy Plan  General Plan: 5-7 times per week (1-2)  Current Treatment Recommendations: Strengthening;ROM;Balance training;Functional mobility training;Transfer training;Gait training;Stair training;Neuromuscular re-education;Patient/Caregiver education & training;Equipment evaluation, education, & procurement;Therapeutic activities;Home exercise program;Modalities;Safety education & training    Restrictions  Restrictions/Precautions  Restrictions/Precautions: Fall Risk  Required Braces or Orthoses?: No  Implants present? : Metal implants  Position Activity Restriction  Other position/activity restrictions: Left and bilateral L4-5-S1 decompression left L5-S1 discectomy 11/15/24 with Dr. Hilton     Subjective   Subjective  Subjective: Pt. in bed and reports 6/10 low back pain. Pt. did report taking something for pain around noon from nursing. I have to use restroom first.  Pain: 5/10 back pain  Orientation  Overall Orientation Status: Within Normal Limits  Orientation Level: Oriented X4  Cognition  Overall Cognitive Status: 
Physical Therapy  Facility/Department: [facility] MED SURG UNIT  Daily Treatment Note  NAME: Rosemarie Grant  : 1950  MRN: [id number]    Date of Service: 2024    Discharge Recommendations:  (P) Home with Home health PT, Outpatient PT, Continue to assess pending progress        Patient Diagnosis(es): 73yo, L4-S1 disektomy    Assessment  Assessment: (P) Pt tolerated 6' gait trial x 2 with VC's for upright posture with pt able to maintain 75% of rest of walk after instruction. pt instructed to engage core for improved posture and LB pain with gait as well. Pt tolerated session well and ended in room chair with small step stool desired to decrease LB pain.  Activity Tolerance: (P) Patient limited by pain    Plan  Physical Therapy Plan  General Plan: (P) 5-7 times per week (1-2)  Current Treatment Recommendations: (P) Strengthening;ROM;Balance training;Functional mobility training;Transfer training;Gait training;Stair training;Neuromuscular re-education;Patient/Caregiver education & training;Equipment evaluation, education, & procurement;Therapeutic activities;Home exercise program;Modalities;Safety education & training    Restrictions  Restrictions/Precautions  Restrictions/Precautions: Fall Risk  Required Braces or Orthoses?: No  Implants present? : Metal implants  Position Activity Restriction  Other position/activity restrictions: Left and bilateral L4-5-S1 decompression left L5-S1 discectomy 11/15/24 with Dr. Hilton     Subjective   Subjective  Subjective: (P) Pt in bed at arrival, states 7/10 LB pain and agreeable to PT.  Pain: (P) 7/10 back pain  Orientation  Overall Orientation Status: (P) Within Normal Limits  Orientation Level: (P) Oriented to place;Oriented to time;Oriented to situation;Oriented to person  Cognition  Overall Cognitive Status: (P) WNL  Cognition Comment: (P) Alert, pleasant, cooperative    Objective  Vitals     Bed Mobility Training  Bed Mobility Training: (P) No  Balance  Sitting: (P) 
Psychosocial Assessment     Physical Appearance: Average    Dress: Neat    Hygiene: Good    Speech: Coherent    Affect/Mood: Appropriate, Calm, and Cooperative    Alert and Orientated: Person and Place and situation     Thought Process: Relevant    Behavior: Cooperative    Resides: Patient reports residing at home independently     DME: Walker.  Patient identifies no DME needs at this time    Support System:  Patient identifies daughters and adult grand-children as supportive and able to assist with patient's care needs, grocery shopping, errands, household chores, and transportation needs prn     History of Mental Health: Patient identifies no past or current mental health issues     Suicidal/ Homicidal:  No    Food: Patient reports that family will be able to assist with grocery shopping and meal prep.  Patient reports being able to afford cost of food     Medication: [facility]  Pharmacy in [place] on [facility].  Patient reports being able to afford cost of medication    Transportation:Yes      Help at home needs: Family is identified as supportive and patient reports that family plans to assist with patient's care needs prn upon DC     DC Plan: Patient plans to return home upon DC with family assisting with care needs and C following upon DC       Plan for transition of care is related to the following treatment goals: \"Being able to walk and put on my own clothes\"     Hobbies or Interests: swimming and exercising along with socializing     Where Hobbies or Interests offered to patient at [facility] unit:Patient reports that she has been enjoying visits from family    The patient was provided with choice of provider, and agrees with the discharge plan: on-going     Freedom of choice provided: Yes    The patient was provided with choice of all post acute providers and agrees with the discharge plan: on-going     Electronically signed by ABDULKADIR Denney on 11/25/2024 at 2:48 PM    
Pt A/O x4, c/o of pain to lower back. PRN portia given, pt verbalize improvement. Drsg to back incision changed x2, d/t bloody drainage, small amount. Meds taken as indicated, call light within reach.   
Pt A/O x4, c/o pain to lower back incision. Tylenol given, meds taken as ordered. Drsg to lower back incision changed d/t drainage noted, small amount, yellow/light brown in color. Call light within reach.   
Intact  Standing: Intact;With support (FWW used SBA)  Standing - Static: Good  Standing - Dynamic: Fair  Transfer Training  Transfer Training: Yes  Overall Level of Assistance: Modified independent  Interventions: Safety awareness training;Verbal cues  Sit to Stand: Modified independent  Stand to Sit: Modified independent  Bed to Chair: Modified independent  Gait Training  Gait Training: Yes  Gait  Gait Training: Yes  Overall Level of Assistance: Modified independent  Distance (ft):  (65' x 2)  Assistive Device: Walker, rolling  Interventions: Verbal cues  Speed/Jagruti: Pace decreased (< 100 feet/min)  Stance: Left decreased  Gait Abnormalities:  (Pt. demo's flexed trunk at hips with decrease hip rotation. Lumbar stiffness demo'd and limited b pain. Slight improve trunk upright in p.m. with vc's and adjusting walker height up 1 inch more.)     PT Exercises  Exercise Treatment: Sit to stand from couch surface in lobby x 4 in 13.23 seconds.  Sit to stand x 4 from lounge chair with B arm rests in 13.16 seconds.  A/AROM Exercises: Supported standing therex heel and toe raises x 10, Standing March x 10, Standing Hip ABD x 10, Standing Hip Ext x 10, standing HS curl x 10  Static Standing Balance Exercises: Supported stand ther ex B LE: hamstring curls x 10', hip abduction x 10', mini squats x 10' , hip extension x 10, AP's x10  Dynamic Standing Balance Exercises: // bars retro stepping ~ 8 feet leading with step to pattern leading with R LE 8 feet and than leading with L LE 8 feet x 2 trials each leg. Pt. demo's small retro step within neutral position. one UE support tolerated with improve trunk upright participation.  Other Specialty Interventions  Other Treatments/Modalities: Pt. education on CP for pain management prn to reduce sireness after ther ex.         Goals  Short Term Goals  Time Frame for Short Term Goals: 2-3 days  Short Term Goal 1: Independent bed mobility  Short Term Goal 2: transfers with 
Goals   Patient goals : \"I want to get back to doing the things I enjoy.\"    AM-PAC - ADL       Therapy Time   Individual Concurrent Group Co-treatment   Time In 1255         Time Out 1345         Minutes 50                 Xuan Ayon MARTHA     
Likes to vacation, family outings, [employer], water classes  Vision/Hearing  Vision  Vision: Impaired  Vision Exceptions: Wears glasses at all times  Hearing  Hearing: Within functional limits    Cognition   Orientation  Overall Orientation Status: Within Normal Limits  Orientation Level: Oriented to place;Oriented to time;Oriented to situation;Oriented to person  Cognition  Overall Cognitive Status: WNL  Cognition Comment: Alert, pleasant, cooperative    Objective  Temp: 99.9 °F (37.7 °C)  Pulse: 78  Heart Rate Source: Monitor  Respirations: 18  SpO2: 100 %  BP: (!) 151/70  MAP (Calculated): 97  BP Method: Automatic     Observation/Palpation  Posture: Fair  Observation: lumbar incision with gauze pad  Gross Assessment  AROM: Generally decreased, functional (observed through functional mobility d/t pt's pain with LE movement, and maintaining neutral spine with AROM)  Strength: Generally decreased, functional (no MMT due to pain, pt with functional mobility strength)                Bed Mobility Training  Bed Mobility Training: No  Overall Level of Assistance: Stand-by assistance;Additional time  Interventions: Safety awareness training;Verbal cues  Supine to Sit: Additional time;Stand-by assistance  Scooting: Stand-by assistance  Balance  Sitting: Intact  Standing: Impaired;With support (FWW CG/SBA, fwd flexed posture due to pain)  Transfer Training  Transfer Training: Yes  Overall Level of Assistance: Contact-guard assistance;Stand-by assistance (FWW, increased time to complete)  Interventions: Safety awareness training;Verbal cues  Sit to Stand: Contact-guard assistance;Stand-by assistance  Stand to Sit: Contact-guard assistance;Stand-by assistance  Toilet Transfer: Contact-guard assistance  Gait Training  Right Side Weight Bearing: As tolerated  Left Side Weight Bearing: As tolerated  Gait  Gait Training: Yes  Left Side Weight Bearing: As tolerated  Right Side Weight Bearing: As tolerated  Overall Level of Assistance: 
device  Short Term Goal 4: up/down 1 curb step supervision  Short Term Goal 5: HEP instruction with pt demo good awareness of posture and neutral spine  Long Term Goals  Time Frame for Long Term Goals : 1 week  Long Term Goal 1: transfers with [place]  Long Term Goal 2: ambulation >=150ft with least restrictive device Mod I  Long Term Goal 3: ascend/descend 4 steps with HR Mod I  Long Term Goal 4: compliance to HEP  Long Term Goal 5: increased B LE strength indicated by pt's ability to complete 4 sit to stands in <=30 seconds  Patient Goals   Patient Goals : to go home    Education  Patient Education  Education Given To: Patient  Education Provided:  (Pt. to call for assist and not to get up on her own with recent back surgery weakness and high pain.)  Education Method: Verbal  Barriers to Learning: None  Education Outcome: Verbalized understanding                  Therapy Time   Individual Concurrent Group Co-treatment   Time In 935         Time Out 1030         Minutes 55                 Krystal Turk, PTA               
pattern leading with R LE 8 feet and than leading with L LE 8 feet x 2 trials each leg. Pt. demo's small retro step within neutral position. one UE support tolerated with improve trunk upright participation.  Other Specialty Interventions  Other Treatments/Modalities: Pt. education on CP for pain management prn to reduce sireness after ther ex.         Goals  Short Term Goals  Time Frame for Short Term Goals: 2-3 days  Short Term Goal 1: Independent bed mobility  Short Term Goal 2: transfers with supervision  Short Term Goal 3: ambulate >=100ft with least restrictive device  Short Term Goal 4: up/down 1 curb step supervision  Short Term Goal 5: HEP instruction with pt demo good awareness of posture and neutral spine  Long Term Goals  Time Frame for Long Term Goals : 1 week  Long Term Goal 1: transfers with [place]  Long Term Goal 2: ambulation >=150ft with least restrictive device Mod I  Long Term Goal 3: ascend/descend 4 steps with HR Mod I  Long Term Goal 4: compliance to HEP  Long Term Goal 5: increased B LE strength indicated by pt's ability to complete 4 sit to stands in <=30 seconds  Patient Goals   Patient Goals : to go home    Education  Patient Education  Education Given To: (P) Patient  Education Provided: (P) Home Exercise Program  Education Provided Comments: (P) Vc's on proper form of Ther Ex.  Education Method: (P) Demonstration;Verbal  Barriers to Learning: (P) None  Education Outcome: (P) Demonstrated understanding    AM-PAC - Mobility              Therapy Time   Individual Concurrent Group Co-treatment   Time In (P) 1025         Time Out (P) 1110         Minutes (P) 45                 Ani De La Garza, PTA           
water              Vision  Vision: Impaired  Vision Exceptions: Wears glasses at all times  Hearing  Hearing: Within functional limits  Cognition  Overall Cognitive Status: WNL  Cognition Comment: Alert, pleasant, cooperative  Orientation  Overall Orientation Status: Within Normal Limits  Orientation Level: Oriented to place;Oriented to time;Oriented to situation;Oriented to person                  Education Given To: Patient  Education Provided: Role of Therapy;Plan of Care;Precautions;Transfer Training;Fall Prevention Strategies;Energy Conservation;Mobility Training;ADL Adaptive Strategies;Equipment  Education Method: Demonstration;Verbal;Teach Back  Barriers to Learning: None  Education Outcome: Verbalized understanding;Demonstrated understanding;Continued education needed                       Goals  Short Term Goals  Time Frame for Short Term Goals: 3-5 days  Short Term Goal 1: Doff/sonam UB/LB clothing SBA  Short Term Goal 2: Bathe UB/LB SBA  Short Term Goal 3: Complete toileting SBA  Short Term Goal 4: Complete functional mobility and transfers SBA  Short Term Goal 5: Demo BUE HEP with min vc  Long Term Goals  Time Frame for Long Term Goals : 7-10 days  Long Term Goal 1: Doff/sonam UB/LB clothing Mod I  Long Term Goal 2: Bathe UB/LB Mod I  Long Term Goal 3: Complete toileting Mod I  Long Term Goal 4: Complete functional mobility and transfers Mod I  Long Term Goal 5: Demo BUE HEP I  Patient Goals   Patient goals : \"I want to get back to doing the things I enjoy.\"      Therapy Time   Individual Concurrent Group Co-treatment   Time In 0830         Time Out 0930         Minutes 60                 Linnette Downey, [id number]     
normal

## 2024-12-04 ENCOUNTER — TELEPHONE (OUTPATIENT)
Age: 74
End: 2024-12-04

## 2024-12-04 NOTE — PROGRESS NOTES
Patient Name: Rosemarie Grant : 1950        Date: 2024      Type of Appt: Post Op    Reason for appt: in alot of pain and home health nurse requested appt-POST OP: 11/15/24 - Left and bilateral L4-5 S1 decompression left L5-S1 discectomy     Pt last seen by Dr Hilton on 10/22/2024    Surgeries: POST OP: 11/15/24 - Left and bilateral L4-5 S1 decompression left L5-S1 discectomy          [facility]  [address] [place] [address] [address]  [facility] [id number]  O:   F:         Patient: Rosemarie Grant  YOB: 1950  Date: 2024    The patient is a 74 y.o. female who presents today for post op.    Surgery: 11/15/24 Left and bilateral L4-5-S1 decompression left L5-S1 discectomy.     Subjective:    Patient progressing well after surgery.  She still deals with occasional back pain and stiffness rates pain 7 out of 10.  This has been slowly improving after surgery.  Preoperative left leg symptoms have completely resolved.  She has no complaints of bilateral leg pain numbness or weakness after the surgery.  Patient denies any incontinence or new sx.    Objective:  Motor 5/5 LE   Sensation intact LE   Incision C/D/I     Patient able to rise out of chair independently.  Can walk slowly from one of the room to the other.  Has slight limited range of motion of bilateral hips knees and ankles.  Full strength bilateral lower extremities.     Assessment and Plan:    Dr. Hilton and myself saw patient today.    Percocet 5-325 mg 14-day supply  Follow-up 1 month.  Explained to patient if she is feeling significantly better with no symptoms at all she can cancel the 1 month appointment for now.    MD Jagdeep PETERS PA-C

## 2024-12-04 NOTE — TELEPHONE ENCOUNTER
Received a voicemail from Carissa Pena at [facility]. She stated that patient was inpatient for status post lumbar diskectomy 11/20-11/27/24. Homecare agency received referral for skilled nursing  for PT and OT.     Carissa is asking if Dr. Hilton can follow patient through her homecare journey?    Please call Carissa back at [phone].

## 2024-12-05 ENCOUNTER — OFFICE VISIT (OUTPATIENT)
Age: 74
End: 2024-12-05
Payer: MEDICARE

## 2024-12-05 VITALS — WEIGHT: 181 LBS | RESPIRATION RATE: 16 BRPM | BODY MASS INDEX: 30.16 KG/M2 | TEMPERATURE: 97.6 F | HEIGHT: 65 IN

## 2024-12-05 DIAGNOSIS — M48.062 SPINAL STENOSIS OF LUMBAR REGION WITH NEUROGENIC CLAUDICATION: Primary | ICD-10-CM

## 2024-12-05 PROCEDURE — 99214 OFFICE O/P EST MOD 30 MIN: CPT | Performed by: NEUROLOGICAL SURGERY

## 2024-12-05 PROCEDURE — 99024 POSTOP FOLLOW-UP VISIT: CPT | Performed by: NEUROLOGICAL SURGERY

## 2024-12-05 RX ORDER — OXYCODONE AND ACETAMINOPHEN 5; 325 MG/1; MG/1
1 TABLET ORAL EVERY 6 HOURS PRN
Qty: 56 TABLET | Refills: 0 | Status: SHIPPED | OUTPATIENT
Start: 2024-12-05 | End: 2024-12-19

## 2024-12-09 ENCOUNTER — HOSPITAL ENCOUNTER (EMERGENCY)
Facility: HOSPITAL | Age: 74
Discharge: HOME | End: 2024-12-09
Attending: EMERGENCY MEDICINE
Payer: MEDICARE

## 2024-12-09 ENCOUNTER — APPOINTMENT (OUTPATIENT)
Dept: RADIOLOGY | Facility: HOSPITAL | Age: 74
End: 2024-12-09
Payer: MEDICARE

## 2024-12-09 ENCOUNTER — CLINICAL DOCUMENTATION (OUTPATIENT)
Dept: NEUROSURGERY | Age: 74
End: 2024-12-09

## 2024-12-09 ENCOUNTER — APPOINTMENT (OUTPATIENT)
Dept: CARDIOLOGY | Facility: HOSPITAL | Age: 74
End: 2024-12-09
Payer: MEDICARE

## 2024-12-09 VITALS
BODY MASS INDEX: 27.27 KG/M2 | OXYGEN SATURATION: 95 % | RESPIRATION RATE: 15 BRPM | DIASTOLIC BLOOD PRESSURE: 67 MMHG | SYSTOLIC BLOOD PRESSURE: 149 MMHG | HEIGHT: 67 IN | TEMPERATURE: 98.2 F | WEIGHT: 173.72 LBS | HEART RATE: 76 BPM

## 2024-12-09 DIAGNOSIS — N30.90 CYSTITIS: Primary | ICD-10-CM

## 2024-12-09 LAB
ALBUMIN SERPL BCP-MCNC: 3.8 G/DL (ref 3.4–5)
ALP SERPL-CCNC: 58 U/L (ref 33–136)
ALT SERPL W P-5'-P-CCNC: 19 U/L (ref 7–45)
ANION GAP SERPL CALC-SCNC: 12 MMOL/L (ref 10–20)
APPEARANCE UR: ABNORMAL
AST SERPL W P-5'-P-CCNC: 22 U/L (ref 9–39)
ATRIAL RATE: 85 BPM
BACTERIA #/AREA URNS AUTO: ABNORMAL /HPF
BASOPHILS # BLD AUTO: 0.03 X10*3/UL (ref 0–0.1)
BASOPHILS NFR BLD AUTO: 0.5 %
BILIRUB SERPL-MCNC: 0.7 MG/DL (ref 0–1.2)
BILIRUB UR STRIP.AUTO-MCNC: NEGATIVE MG/DL
BUN SERPL-MCNC: 10 MG/DL (ref 6–23)
CALCIUM SERPL-MCNC: 9.2 MG/DL (ref 8.6–10.3)
CHLORIDE SERPL-SCNC: 100 MMOL/L (ref 98–107)
CO2 SERPL-SCNC: 28 MMOL/L (ref 21–32)
COLOR UR: YELLOW
CREAT SERPL-MCNC: 0.72 MG/DL (ref 0.5–1.05)
EGFRCR SERPLBLD CKD-EPI 2021: 89 ML/MIN/1.73M*2
EOSINOPHIL # BLD AUTO: 0.02 X10*3/UL (ref 0–0.4)
EOSINOPHIL NFR BLD AUTO: 0.4 %
ERYTHROCYTE [DISTWIDTH] IN BLOOD BY AUTOMATED COUNT: 11.7 % (ref 11.5–14.5)
GLUCOSE SERPL-MCNC: 99 MG/DL (ref 74–99)
GLUCOSE UR STRIP.AUTO-MCNC: NORMAL MG/DL
HCT VFR BLD AUTO: 34.1 % (ref 36–46)
HGB BLD-MCNC: 11.4 G/DL (ref 12–16)
HOLD SPECIMEN: NORMAL
IMM GRANULOCYTES # BLD AUTO: 0.01 X10*3/UL (ref 0–0.5)
IMM GRANULOCYTES NFR BLD AUTO: 0.2 % (ref 0–0.9)
KETONES UR STRIP.AUTO-MCNC: NEGATIVE MG/DL
LACTATE SERPL-SCNC: 0.6 MMOL/L (ref 0.4–2)
LEUKOCYTE ESTERASE UR QL STRIP.AUTO: ABNORMAL
LYMPHOCYTES # BLD AUTO: 1.52 X10*3/UL (ref 0.8–3)
LYMPHOCYTES NFR BLD AUTO: 27.8 %
MCH RBC QN AUTO: 31.1 PG (ref 26–34)
MCHC RBC AUTO-ENTMCNC: 33.4 G/DL (ref 32–36)
MCV RBC AUTO: 93 FL (ref 80–100)
MONOCYTES # BLD AUTO: 0.77 X10*3/UL (ref 0.05–0.8)
MONOCYTES NFR BLD AUTO: 14.1 %
NEUTROPHILS # BLD AUTO: 3.12 X10*3/UL (ref 1.6–5.5)
NEUTROPHILS NFR BLD AUTO: 57 %
NITRITE UR QL STRIP.AUTO: NEGATIVE
NRBC BLD-RTO: 0 /100 WBCS (ref 0–0)
P AXIS: 53 DEGREES
P OFFSET: 192 MS
P ONSET: 137 MS
PH UR STRIP.AUTO: 8 [PH]
PLATELET # BLD AUTO: 226 X10*3/UL (ref 150–450)
POTASSIUM SERPL-SCNC: 3.7 MMOL/L (ref 3.5–5.3)
PR INTERVAL: 152 MS
PROT SERPL-MCNC: 8.1 G/DL (ref 6.4–8.2)
PROT UR STRIP.AUTO-MCNC: ABNORMAL MG/DL
Q ONSET: 213 MS
QRS COUNT: 14 BEATS
QRS DURATION: 92 MS
QT INTERVAL: 352 MS
QTC CALCULATION(BAZETT): 418 MS
QTC FREDERICIA: 395 MS
R AXIS: -31 DEGREES
RBC # BLD AUTO: 3.67 X10*6/UL (ref 4–5.2)
RBC # UR STRIP.AUTO: ABNORMAL /UL
RBC #/AREA URNS AUTO: >20 /HPF
SODIUM SERPL-SCNC: 136 MMOL/L (ref 136–145)
SP GR UR STRIP.AUTO: 1.02
SQUAMOUS #/AREA URNS AUTO: ABNORMAL /HPF
T AXIS: 51 DEGREES
T OFFSET: 389 MS
TRANS CELLS #/AREA UR COMP ASSIST: ABNORMAL /HPF
UROBILINOGEN UR STRIP.AUTO-MCNC: ABNORMAL MG/DL
VENTRICULAR RATE: 85 BPM
WBC # BLD AUTO: 5.5 X10*3/UL (ref 4.4–11.3)
WBC #/AREA URNS AUTO: >50 /HPF

## 2024-12-09 PROCEDURE — 81001 URINALYSIS AUTO W/SCOPE: CPT | Performed by: EMERGENCY MEDICINE

## 2024-12-09 PROCEDURE — 99285 EMERGENCY DEPT VISIT HI MDM: CPT | Mod: 25 | Performed by: EMERGENCY MEDICINE

## 2024-12-09 PROCEDURE — 2500000004 HC RX 250 GENERAL PHARMACY W/ HCPCS (ALT 636 FOR OP/ED): Performed by: EMERGENCY MEDICINE

## 2024-12-09 PROCEDURE — 87040 BLOOD CULTURE FOR BACTERIA: CPT | Mod: STJLAB | Performed by: STUDENT IN AN ORGANIZED HEALTH CARE EDUCATION/TRAINING PROGRAM

## 2024-12-09 PROCEDURE — 87086 URINE CULTURE/COLONY COUNT: CPT | Mod: STJLAB | Performed by: EMERGENCY MEDICINE

## 2024-12-09 PROCEDURE — 96365 THER/PROPH/DIAG IV INF INIT: CPT

## 2024-12-09 PROCEDURE — 2550000001 HC RX 255 CONTRASTS: Performed by: EMERGENCY MEDICINE

## 2024-12-09 PROCEDURE — 84520 ASSAY OF UREA NITROGEN: CPT | Performed by: EMERGENCY MEDICINE

## 2024-12-09 PROCEDURE — 96366 THER/PROPH/DIAG IV INF ADDON: CPT

## 2024-12-09 PROCEDURE — 74177 CT ABD & PELVIS W/CONTRAST: CPT

## 2024-12-09 PROCEDURE — 83605 ASSAY OF LACTIC ACID: CPT | Performed by: EMERGENCY MEDICINE

## 2024-12-09 PROCEDURE — 51798 US URINE CAPACITY MEASURE: CPT

## 2024-12-09 PROCEDURE — 93005 ELECTROCARDIOGRAM TRACING: CPT

## 2024-12-09 PROCEDURE — 74177 CT ABD & PELVIS W/CONTRAST: CPT | Performed by: RADIOLOGY

## 2024-12-09 PROCEDURE — 36415 COLL VENOUS BLD VENIPUNCTURE: CPT | Performed by: STUDENT IN AN ORGANIZED HEALTH CARE EDUCATION/TRAINING PROGRAM

## 2024-12-09 PROCEDURE — 2500000001 HC RX 250 WO HCPCS SELF ADMINISTERED DRUGS (ALT 637 FOR MEDICARE OP): Performed by: STUDENT IN AN ORGANIZED HEALTH CARE EDUCATION/TRAINING PROGRAM

## 2024-12-09 PROCEDURE — 85025 COMPLETE CBC W/AUTO DIFF WBC: CPT | Performed by: EMERGENCY MEDICINE

## 2024-12-09 PROCEDURE — 96367 TX/PROPH/DG ADDL SEQ IV INF: CPT

## 2024-12-09 RX ORDER — ACETAMINOPHEN 325 MG/1
650 TABLET ORAL ONCE
Status: COMPLETED | OUTPATIENT
Start: 2024-12-09 | End: 2024-12-09

## 2024-12-09 RX ORDER — CEPHALEXIN 500 MG/1
500 CAPSULE ORAL 4 TIMES DAILY
Qty: 28 CAPSULE | Refills: 0 | Status: SHIPPED | OUTPATIENT
Start: 2024-12-09 | End: 2024-12-16

## 2024-12-09 RX ORDER — VANCOMYCIN HYDROCHLORIDE 1 G/200ML
1000 INJECTION, SOLUTION INTRAVENOUS ONCE
Status: DISCONTINUED | OUTPATIENT
Start: 2024-12-09 | End: 2024-12-09 | Stop reason: HOSPADM

## 2024-12-09 RX ORDER — CEFTRIAXONE 2 G/50ML
2 INJECTION, SOLUTION INTRAVENOUS ONCE
Status: COMPLETED | OUTPATIENT
Start: 2024-12-09 | End: 2024-12-09

## 2024-12-09 ASSESSMENT — PAIN DESCRIPTION - LOCATION
LOCATION_2: OTHER (COMMENT)
LOCATION: BACK

## 2024-12-09 ASSESSMENT — PAIN - FUNCTIONAL ASSESSMENT: PAIN_FUNCTIONAL_ASSESSMENT: 0-10

## 2024-12-09 ASSESSMENT — LIFESTYLE VARIABLES
HAVE PEOPLE ANNOYED YOU BY CRITICIZING YOUR DRINKING: NO
EVER FELT BAD OR GUILTY ABOUT YOUR DRINKING: NO
TOTAL SCORE: 0
HAVE YOU EVER FELT YOU SHOULD CUT DOWN ON YOUR DRINKING: NO
EVER HAD A DRINK FIRST THING IN THE MORNING TO STEADY YOUR NERVES TO GET RID OF A HANGOVER: NO

## 2024-12-09 ASSESSMENT — PAIN SCALES - GENERAL
PAINLEVEL_OUTOF10: 7
PAINLEVEL_OUTOF10: 5 - MODERATE PAIN

## 2024-12-09 ASSESSMENT — PAIN DESCRIPTION - PAIN TYPE: TYPE: ACUTE PAIN

## 2024-12-09 NOTE — DISCHARGE INSTRUCTIONS
Please follow-up with Dr. Howard at 8:30 AM tomorrow 12/10/2024.  Take Keflex as prescribed 4 times a day for 7 days.  Return to the emergency department if develop persistent fever, sweating, lightheadedness, or if concerns arise.

## 2024-12-09 NOTE — PROGRESS NOTES
12/9/2024 5:30 PM [employer] call from [facility] .    11/15/24 Left and bilateral L4-5-S1 decompression left L5-S1 discectomy.     Patient has fever tachycardia and urinary tract infection.  CT scan abdomen pelvis was performed to rule out other issues.  Found a 3 cm fluid collection posterior to the surgical site in the lumbar area.  Radiology could not rule out abscess.    Patient will be seen in my office 8:30 AM 12/10/2024.

## 2024-12-09 NOTE — ED PROVIDER NOTES
EMERGENCY DEPARTMENT ENCOUNTER      Pt Name: Collin Lazar  MRN: [id number]  Birthdate 1950  Date of evaluation: 12/9/2024  Provider: Rekha Mcconnell MD    CHIEF COMPLAINT       Chief Complaint   Patient presents with    Back Pain     Has back surgery at [facility] x3 weeks (they removed a bulging disc that was attached to the nerve)     Lower Urinary Tract Symptoms     Burning, blood with wiping         HISTORY OF PRESENT ILLNESS    A 74-year-old female s/p lumbar discectomy POD # 24, past medical history of essential hypertension, urgency, cervical spondylolysis, GERD, rheumatoid arthritis, sacroiliitis, CAD who comes in due to burning sensation and blood in urine started today morning.  Patient reported that back pain is improving after surgery and currently 5/10. She is unsure about subjective fever and denies urine frequency. Denies chest pain, palpitations, shortness of breath, rash, nausea/vomiting/diarrhea, fecal incontinence, numbness/tingling sensation in upper or lower extremities or headache currently.        Nursing Notes were reviewed.    PAST MEDICAL HISTORY     Past Medical History:   Diagnosis Date    Arthritis     Hypertension     Personal history of other diseases of the circulatory system     History of hypertension    Personal history of other diseases of the musculoskeletal system and connective tissue     History of rheumatoid arthritis    Personal history of other malignant neoplasm of bronchus and lung     History of malignant neoplasm of lung    Systemic lupus erythematosus, unspecified     Lupus         SURGICAL HISTORY       Past Surgical History:   Procedure Laterality Date    MR HEAD ANGIO WO IV CONTRAST  9/4/2020    MR HEAD ANGIO WO IV CONTRAST 9/4/2020 [facility]    OTHER SURGICAL HISTORY  01/16/2021    Lumpectomy    OTHER SURGICAL HISTORY  01/28/2022    Hysterectomy    US ASPIRATION INJECTION INTERMEDIATE JOINT  6/10/2021    US ASPIRATION INJECTION INTERMEDIATE  JOINT 6/10/2021 [facility]         CURRENT MEDICATIONS       Previous Medications    ACETAMINOPHEN (TYLENOL) 500 MG CAPSULE    Take by mouth every 6 hours.    ACYCLOVIR (ZOVIRAX) 5 % OINTMENT    apply to affected area as directed five times a day    ADALIMUMAB (HUMIRA PEN) 40 MG/0.8 ML PEN INJECTOR KIT PEN-INJECTOR    Inject under the skin.    ALBUTEROL 90 MCG/ACTUATION INHALER    Inhale 2 puffs 4 times a day as needed.    AMLODIPINE (NORVASC) 2.5 MG TABLET    Take 1 tablet (2.5 mg) by mouth once daily.    ATORVASTATIN (LIPITOR) 40 MG TABLET    Take 1 tablet (40 mg) by mouth once daily.    CARVEDILOL (COREG) 6.25 MG TABLET    Take 1 tablet (6.25 mg) by mouth 2 times a day with meals.    CEFUROXIME (CEFTIN) 500 MG TABLET    Take 1 tablet (500 mg) by mouth 2 times a day.    CHOLECALCIFEROL (VITAMIN D-3) 25 MCG (1000 UT) TABLET    Take 1 tablet (1,000 Units) by mouth once daily.    CLINDAMYCIN (CLEOCIN T) 1 % GEL    Apply topically twice a day.    DIAZEPAM (VALIUM) 10 MG TABLET    TAKE 1 TABLET 1 HOUR PRIOR TO PROCEDURE    DICLOFENAC (CATAFLAM) 50 MG TABLET    Take 1 tablet (50 mg) by mouth every 12 hours.    DOXYCYCLINE (VIBRAMYCIN) 100 MG CAPSULE    TAKE 1 CAPSULE BY MOUTH TWICE A DAY WITH FOOD FOR 7 DAYS    ENOXAPARIN (LOVENOX) 40 MG/0.4 ML SYRINGE    Inject 0.4 mL (40 mg) under the skin.    ETANERCEPT (ENBREL) 50 MG/ML (1 ML) INJECTION    Inject under the skin.    FLUTICASONE (FLONASE ALLERGY RELIEF) 50 MCG/ACTUATION NASAL SPRAY    Administer into affected nostril(s).    GABAPENTIN (NEURONTIN) 100 MG CAPSULE    Take by mouth.    HYDROCHLOROTHIAZIDE (MICROZIDE) 12.5 MG CAPSULE    Take 1 capsule (12.5 mg) by mouth once daily.    IBUPROFEN (MOTRIN IB) 200 MG TABLET        LATANOPROST (XALATAN) 0.005 % OPHTHALMIC SOLUTION    Administer 1 drop into both eyes once daily at bedtime.    LEFLUNOMIDE (ARAVA) 10 MG TABLET    Take 2 tablets (20 mg) by mouth once daily.    MAGNESIUM OXIDE (MAG-OX) 400 MG TABLET     Take 1 tablet (400 mg) by mouth once daily.    MIRABEGRON (MYRBETRIQ) 50 MG TABLET EXTENDED RELEASE 24 HR 24 HR TABLET    Take 1 tablet (50 mg) by mouth.    NITROFURANTOIN (MACRODANTIN) 50 MG CAPSULE    1 capsule (50 mg).    NYSTATIN (MYCOSTATIN) 100,000 UNIT/ML SUSPENSION    SWISH AND SWALLOW 5ML BY MOUTH 3 TIMES A DAY    PANTOPRAZOLE (PROTONIX) 20 MG EC TABLET    Take 1 tablet (20 mg) by mouth once daily.    PANTOPRAZOLE (PROTONIX) 40 MG EC TABLET    Take by mouth.    PAXLOVID 300 MG (150 MG X 2)-100 MG TABLET THERAPY PACK    TAKE 3 TABLETS BY MOUTH TWICE DAILY AS DIRECTED ON PACKAGING FOR 5 DAYS    POLYETHYLENE GLYCOL (GLYCOLAX, MIRALAX) 17 GRAM PACKET    Take 17 g by mouth once daily.    POTASSIUM CHLORIDE CR 20 MEQ ER TABLET    Take 1 tablet (20 mEq) by mouth once daily. Do not crush or chew.    SUCRALFATE (CARAFATE) 1 GRAM TABLET    TAKE 1 TABLET BY MOUTH FOUR TIMES DAILY FOR 14 DAYS    TROSPIUM (SANCTURA) 20 MG TABLET    Take 1 tablet (20 mg) by mouth.       ALLERGIES     Naproxen    FAMILY HISTORY     No family history on file.       SOCIAL HISTORY       Social History     Socioeconomic History    Marital status:    Tobacco Use    Smoking status: Former     Types: Cigarettes    Smokeless tobacco: Never   Vaping Use    Vaping status: Never Used   Substance and Sexual Activity    Alcohol use: Never    Drug use: Never    Sexual activity: Never     Social Drivers of Health     Financial Resource Strain: Low Risk  (5/23/2024)    Received from [facility]    Overall Financial Resource Strain (CARDIA)     Difficulty of Paying Living Expenses: Not hard at all   Recent Concern: Financial Resource Strain - High Risk (4/1/2024)    Overall Financial Resource Strain (CARDIA)     Difficulty of Paying Living Expenses: Very hard   Food Insecurity: No Food Insecurity (5/23/2024)    Received from [facility]    Hunger Vital Sign     Worried About Running Out of Food in the Last Year: Never true     Ran Out of Food in the Last Year: Never true   Transportation Needs: Unknown (5/23/2024)    Received from [facility]    PRAPARE - Transportation     Lack of Transportation (Non-Medical): No   Housing Stability: Unknown (5/23/2024)    Received from [facility]    Housing Stability Vital Sign     Unstable Housing in the Last Year: No       SCREENINGS                        PHYSICAL EXAM    (up to 7 for level 4, 8 or more for level 5)     ED Triage Vitals [12/09/24 1026]   Temperature Heart Rate Respirations BP   (!) 39.3 °C (102.7 °F) 97 18 145/71      Pulse Ox Temp Source Heart Rate Source Patient Position   (!) 93 % Temporal Monitor Sitting      BP Location FiO2 (%)     Right arm --       Physical Exam  Vitals reviewed.   Constitutional:       General: She is not in acute distress.     Appearance: Normal appearance. She is not ill-appearing, toxic-appearing or diaphoretic.   HENT:      Head: Normocephalic and atraumatic.   Eyes:      Extraocular Movements: Extraocular movements intact.   Cardiovascular:      Rate and Rhythm: Normal rate and regular rhythm.      Pulses: Normal pulses.      Heart sounds: Normal heart sounds.   Pulmonary:      Effort: Pulmonary effort is normal. No respiratory distress.      Breath sounds: Normal breath sounds. No stridor. No wheezing, rhonchi or rales.   Chest:      Chest wall: No tenderness.   Abdominal:      General: Abdomen is flat. Bowel sounds are normal. There is no distension.      Palpations: Abdomen is soft.      Tenderness: There is no abdominal tenderness. There is no guarding or rebound.   Musculoskeletal:         General: No swelling or tenderness. Normal range of motion.      Cervical back: Normal range of motion.      Comments: No saddle anesthesia, no  numbness/tingling sensation in upper or lower extremities.  Full ROM in the lower and upper extremities.  Scar on her back healing well. No discharge, no erythema, no signs of infected wound.    Skin:     General: Skin is warm.      Findings: No bruising, erythema, lesion or rash.   Neurological:      General: No focal deficit present.      Mental Status: She is alert and oriented to person, place, and time.   Psychiatric:         Mood and Affect: Mood normal.         Behavior: Behavior normal.        DIAGNOSTIC RESULTS     LABS:  Labs Reviewed - No data to display    All other labs were within normal range or not returned as of this dictation.    Imaging  No orders to display        Procedures  Procedures     EMERGENCY DEPARTMENT COURSE/MDM:     ED Course as of 12/09/24 1830   Mon Dec 09, 2024   1400 This is a 74 years old female patient presented to the emergency department with a chief complaint of urinary dysuria, hematuria and my urgency.  Also noted back pain.  Stated that she had discectomy recently but denies any saddle anesthesia, denies loss of control over bladder or bowel, denies numbness, or weakness.  Denies headache, lightheadedness, dizziness, chest pain, shortness of breath, cough.    Review of system: As above in the HPI section.    Physical exam revealed a 74 years old female patient who does not appear to be in acute distress  Cardiopulmonary exam with normal S1, S2, no gallop, or murmur, no wheezes, rales, rhonchi or any signs of respiratory distress  Abdominal exam: Abdomen soft, nondistended, nontender, no guarding, rigidity, rebound.  There is tenderness to the right costovertebral angle on percussion.  Skin exam: Warm and dry  Neurologic exam: No acute neurologic distress.  Labs obtained and were unremarkable, urine is concerning for urinary tract infection.  Will cover the patient correctly with Rocephin.  Will administer Tylenol for pain control as well as the fever.  We initiated sepsis  workup given the fever and concern of urinary tract infection.  Will obtain CT abdomen pelvis with IV contrast dye to rule out obstructive uropathy.  Presentation could be related to uropathy versus pyelonephritis [ME]   1646 CT scan with a concern of 3 cm fluid collection.  Will contact the neurosurgery team at [facility] to discuss the case for admission to [place] versus transfer to [facility]. [ME]   1746 At 1743 I spoke to Dr. Howard that neurosurgeon from [facility] with that the discectomy and he recommends that he see the patient in the morning at 8:30 AM.  Will discharge the patient on Keflex. [ME]      ED Course User Index  [ME] Artemio Dias,          Diagnoses as of 12/09/24 1830   Cystitis        Medical Decision Making  A 74-year-old female s/p lumbar discectomy POD # 24 comes in with dysuria and hematuria. Patient presented hemodynamically stable, no tachycardic or tachypneic and saturating well at room air but febrile. No saddle anesthesia, no fecal incontinence, no loss of sensation and no weakness in upper or lower extremities. Back scar healing well with no signs of infection. Physical examination as above. CMP is unremarkable and lactate within normal parameters.  CBC with no leukocytosis. UA positive for UTI.  Patient was started on Zosyn and Rocephin per attending.  Tylenol was given for pain.  CT abdomen pelvis revealing Ill-defined bladder wall thickening with probable mucosal enhancement consistent with cystitis. Postsurgical changes in the lumbar spine with nearly 3 cm fluid collection in the overlying soft tissues, infection can not be excluded based on CT appearance.  On reassessment, patient is hemodynamically stable, afebrile saturating well at room air 95% and no tachycardic. No concerns for sepsis at this time. Dr. Howard (neurosurgeon, who performed lumbar discectomy in [facility] at [place] was consulted. He recommended to discharge patient with Keflex prescription  for 7 days and the requested to see the patient at his office tomorrow morning 8:30AM. Results/findings and instructions are discussed with patient at bedside. Patient is discharged home with Keflex, return precautions and instructions to follow-up with the neurosurgeon tomorrow AM at his office.    Patient and or family in agreement and understanding of treatment plan.  All questions answered.      I reviewed the case with the attending ED physician. The attending ED physician agrees with the plan. Patient and/or patient´s representative was counseled regarding labs, imaging, likely diagnosis, and plan. All questions were answered.    ED Medications administered this visit:  Medications - No data to display    New Prescriptions from this visit:    New Prescriptions    No medications on file       Follow-up:  No follow-up provider specified.      Final Impression: No diagnosis found.      (Please note that portions of this note were completed with a voice recognition program.  Efforts were made to edit the dictations but occasionally words are mis-transcribed.)     Rekha Mcconnell MD  Resident  12/09/24 8823

## 2024-12-10 ENCOUNTER — OFFICE VISIT (OUTPATIENT)
Age: 74
End: 2024-12-10
Attending: NEUROLOGICAL SURGERY
Payer: MEDICARE

## 2024-12-10 ENCOUNTER — HOSPITAL ENCOUNTER (OUTPATIENT)
Dept: MRI IMAGING | Age: 74
Discharge: HOME | End: 2024-12-12
Attending: NEUROLOGICAL SURGERY
Payer: MEDICARE

## 2024-12-10 VITALS
HEIGHT: 65 IN | TEMPERATURE: 97.2 F | DIASTOLIC BLOOD PRESSURE: 66 MMHG | BODY MASS INDEX: 27.99 KG/M2 | RESPIRATION RATE: 16 BRPM | WEIGHT: 168 LBS | SYSTOLIC BLOOD PRESSURE: 134 MMHG

## 2024-12-10 DIAGNOSIS — M96.842 SEROMA OF MUSCULOSKELETAL STRUCTURE AFTER MUSCULOSKELETAL SYSTEM PROCEDURE: Primary | ICD-10-CM

## 2024-12-10 DIAGNOSIS — M96.842 SEROMA OF MUSCULOSKELETAL STRUCTURE AFTER MUSCULOSKELETAL SYSTEM PROCEDURE: ICD-10-CM

## 2024-12-10 PROCEDURE — 72148 MRI LUMBAR SPINE W/O DYE: CPT

## 2024-12-10 PROCEDURE — 99024 POSTOP FOLLOW-UP VISIT: CPT | Performed by: NEUROLOGICAL SURGERY

## 2024-12-10 NOTE — PROGRESS NOTES
Patient Name: Rosemarie Grant : 1950        Date: 12/10/2024      Type of Appt: Post Op    Reason for appt: POST OP: 11/15/24 - Left and bilateral L4-5 S1 decompression left L5-S1 discectomy     Pt last seen by Dr Hilton on 10/22/2024    Studies done: NO NEW STUDIES     Surgeries: POST OP: 11/15/24 - Left and bilateral L4-5 S1 decompression left L5-S1 discectomy     2024 5:30 PM [facility] call from [facility] .     11/15/24 Left and bilateral L4-5-S1 decompression left L5-S1 discectomy.      Patient has fever tachycardia and urinary tract infection.  CT scan abdomen pelvis was performed to rule out other issues.  Found a 3 cm fluid collection posterior to the surgical site in the lumbar area.  Radiology could not rule out abscess.  Postsurgical changes in the lumbar spine with 3 cm fluid collection in the overlying soft tissues infection cannot be excluded by CT appearance clinical correlation recommended.  The 3 cm rounded hypodensity and stranding and fluid in the midline lower back subcutaneous fat.    Report placed in epic.      12/10/2024 MRI lumbar spine subcutaneous seroma        Patient does complain of dysuria.    The preoperative pain in her back and lower extremities is nearly gone.  She is somewhat stiff.  Examination wound shows it is completely healed no tenderness induration fluctuance or other issues.  Healing well.  Patient gets up independently able to walk heel-to-toe with good stride.  Instructed her to improve her posture.  Has good strength sensation lower extremities.    CT scan as above cannot rule out abscess.  Discussed with the patient unlikely she has abscess.  She has urinary tract infection which will elevate her inflammatory markers.    12/10/2024 MRI lumbar spine  Plan call patient with results

## 2024-12-10 NOTE — PROGRESS NOTES
Patient Name: Rosemarie Grant : 1950        Date: 2024      Type of Appt: Post Op/Phone Call    Reason for appt: 11/15/24 - Left and bilateral L4-5 S1 decompression left L5-S1 discectomy     Pt last seen by Dr Hilton on 12/10/2024    Studies done: 12/10/24 STAT MRI OF LUMBAR AT [facility]    Surgeries: 11/15/24 - Left and bilateral L4-5 S1 decompression left L5-S1 discectomy     Type of Appt: Post Op     Reason for appt: POST OP: 11/15/24 - Left and bilateral L4-5 S1 decompression left L5-S1 discectomy      Pt last seen by Dr Hilton on 10/22/2024     Studies done: NO NEW STUDIES      Surgeries: POST OP: 11/15/24 - Left and bilateral L4-5 S1 decompression left L5-S1 discectomy      2024 5:30 PM [facility] call from [facility] .     11/15/24 Left and bilateral L4-5-S1 decompression left L5-S1 discectomy.      Patient has fever tachycardia and urinary tract infection.  CT scan abdomen pelvis was performed to rule out other issues.  Found a 3 cm fluid collection posterior to the surgical site in the lumbar area.  Radiology could not rule out abscess.  Postsurgical changes in the lumbar spine with 3 cm fluid collection in the overlying soft tissues infection cannot be excluded by CT appearance clinical correlation recommended.  The 3 cm rounded hypodensity and stranding and fluid in the midline lower back subcutaneous fat.     Report placed in epic.       12/10/2024 MRI lumbar spine subcutaneous seroma  Details    Reading Physician Reading Date Result Priority   Therese Black MD  [phone] 12/10/2024      Narrative & Impression  EXAMINATION:  MRI OF THE LUMBAR SPINE WITHOUT CONTRAST, 12/10/2024 9:31 am     TECHNIQUE:  Multiplanar multisequence MRI of the lumbar spine was performed without the  administration of intravenous contrast.     COMPARISON:  MRI of the lumbar spine with and without contrast, 10/03/2024.     HISTORY:  ORDERING SYSTEM PROVIDED HISTORY: Seroma of

## 2024-12-11 ENCOUNTER — OFFICE VISIT (OUTPATIENT)
Age: 74
End: 2024-12-11

## 2024-12-11 VITALS — BODY MASS INDEX: 27.99 KG/M2 | WEIGHT: 168 LBS | HEIGHT: 65 IN

## 2024-12-11 DIAGNOSIS — M96.842 SEROMA OF MUSCULOSKELETAL STRUCTURE AFTER MUSCULOSKELETAL SYSTEM PROCEDURE: Primary | ICD-10-CM

## 2024-12-11 LAB — BACTERIA UR CULT: ABNORMAL

## 2024-12-11 PROCEDURE — 99024 POSTOP FOLLOW-UP VISIT: CPT | Performed by: NEUROLOGICAL SURGERY

## 2024-12-13 ENCOUNTER — TELEPHONE (OUTPATIENT)
Dept: PHARMACY | Facility: HOSPITAL | Age: 74
End: 2024-12-13
Payer: MEDICARE

## 2024-12-13 LAB
BACTERIA BLD CULT: NORMAL
BACTERIA BLD CULT: NORMAL

## 2024-12-13 NOTE — PROGRESS NOTES
EDPD Note: Antibiotics Reviewed and Warranted    Contacted Mr./Mrs./Ms. Collin Lazar regarding a positive urine culture/result that was taken during their recent emergency room visit. I completed education with patient . The patient is being treated appropriately with Keflex.    Patient presented to ED with dysuria, hematuria and urgency. She was discharged with Keflex 500 mg QID x 7 days. Upon assessment, symptoms are beginning to improve. Discussed possibly switching to BID, but patient assured me her doctor wanted her on QID due to being treated by abx recently. She knows she may switch to BID if pill burden becomes too much, but is comfortable with current regimen. She is following up with her PCP later this month.     Susceptibility data from last 90 days.  Collected Specimen Info Organism Ampicillin Cefazolin Cefazolin (uncomplicated UTIs only) Ciprofloxacin Gentamicin Nitrofurantoin Piperacillin/Tazobactam Trimethoprim/Sulfamethoxazole   12/09/24 Urine from Clean Catch/Voided Escherichia coli  S  S  S  S  S  S  S  S        No further follow up needed from EDPD Team.     Juan Cespedes, PharmD

## 2024-12-21 LAB
ATRIAL RATE: 85 BPM
P AXIS: 53 DEGREES
P OFFSET: 192 MS
P ONSET: 137 MS
PR INTERVAL: 152 MS
Q ONSET: 213 MS
QRS COUNT: 14 BEATS
QRS DURATION: 92 MS
QT INTERVAL: 352 MS
QTC CALCULATION(BAZETT): 418 MS
QTC FREDERICIA: 395 MS
R AXIS: -31 DEGREES
T AXIS: 51 DEGREES
T OFFSET: 389 MS
VENTRICULAR RATE: 85 BPM

## 2024-12-26 ENCOUNTER — LAB (OUTPATIENT)
Dept: LAB | Facility: LAB | Age: 74
End: 2024-12-26
Payer: MEDICARE

## 2024-12-26 DIAGNOSIS — E53.8 DEFICIENCY OF OTHER SPECIFIED B GROUP VITAMINS: Primary | ICD-10-CM

## 2024-12-26 DIAGNOSIS — K92.1 MELENA: ICD-10-CM

## 2024-12-26 DIAGNOSIS — N39.0 URINARY TRACT INFECTION, SITE NOT SPECIFIED: ICD-10-CM

## 2024-12-26 DIAGNOSIS — D50.9 IRON DEFICIENCY ANEMIA, UNSPECIFIED: ICD-10-CM

## 2024-12-26 LAB
APPEARANCE UR: CLEAR
BASOPHILS # BLD AUTO: 0.02 X10*3/UL (ref 0–0.1)
BASOPHILS NFR BLD AUTO: 0.6 %
BILIRUB UR STRIP.AUTO-MCNC: NEGATIVE MG/DL
COLOR UR: NORMAL
EOSINOPHIL # BLD AUTO: 0.04 X10*3/UL (ref 0–0.4)
EOSINOPHIL NFR BLD AUTO: 1.2 %
ERYTHROCYTE [DISTWIDTH] IN BLOOD BY AUTOMATED COUNT: 11.9 % (ref 11.5–14.5)
FERRITIN SERPL-MCNC: 770 NG/ML (ref 8–150)
FOLATE SERPL-MCNC: 10.4 NG/ML
GLUCOSE UR STRIP.AUTO-MCNC: NORMAL MG/DL
HCT VFR BLD AUTO: 38.1 % (ref 36–46)
HGB BLD-MCNC: 12.3 G/DL (ref 12–16)
IMM GRANULOCYTES # BLD AUTO: 0 X10*3/UL (ref 0–0.5)
IMM GRANULOCYTES NFR BLD AUTO: 0 % (ref 0–0.9)
IRON SATN MFR SERPL: 40 % (ref 25–45)
IRON SERPL-MCNC: 123 UG/DL (ref 35–150)
KETONES UR STRIP.AUTO-MCNC: NEGATIVE MG/DL
LEUKOCYTE ESTERASE UR QL STRIP.AUTO: NEGATIVE
LYMPHOCYTES # BLD AUTO: 2.04 X10*3/UL (ref 0.8–3)
LYMPHOCYTES NFR BLD AUTO: 60.5 %
MCH RBC QN AUTO: 31.1 PG (ref 26–34)
MCHC RBC AUTO-ENTMCNC: 32.3 G/DL (ref 32–36)
MCV RBC AUTO: 96 FL (ref 80–100)
MONOCYTES # BLD AUTO: 0.64 X10*3/UL (ref 0.05–0.8)
MONOCYTES NFR BLD AUTO: 19 %
NEUTROPHILS # BLD AUTO: 0.63 X10*3/UL (ref 1.6–5.5)
NEUTROPHILS NFR BLD AUTO: 18.7 %
NITRITE UR QL STRIP.AUTO: NEGATIVE
NRBC BLD-RTO: 0 /100 WBCS (ref 0–0)
PH UR STRIP.AUTO: 6 [PH]
PLATELET # BLD AUTO: 239 X10*3/UL (ref 150–450)
PROT UR STRIP.AUTO-MCNC: NEGATIVE MG/DL
RBC # BLD AUTO: 3.96 X10*6/UL (ref 4–5.2)
RBC # UR STRIP.AUTO: NEGATIVE /UL
SP GR UR STRIP.AUTO: 1.01
TIBC SERPL-MCNC: 307 UG/DL (ref 240–445)
UIBC SERPL-MCNC: 184 UG/DL (ref 110–370)
UROBILINOGEN UR STRIP.AUTO-MCNC: NORMAL MG/DL
VIT B12 SERPL-MCNC: 526 PG/ML (ref 211–911)
WBC # BLD AUTO: 3.4 X10*3/UL (ref 4.4–11.3)

## 2024-12-26 PROCEDURE — 82746 ASSAY OF FOLIC ACID SERUM: CPT

## 2024-12-26 PROCEDURE — 82728 ASSAY OF FERRITIN: CPT

## 2024-12-26 PROCEDURE — 83550 IRON BINDING TEST: CPT

## 2024-12-26 PROCEDURE — 81003 URINALYSIS AUTO W/O SCOPE: CPT

## 2024-12-26 PROCEDURE — 83540 ASSAY OF IRON: CPT

## 2024-12-26 PROCEDURE — 87086 URINE CULTURE/COLONY COUNT: CPT

## 2024-12-26 PROCEDURE — 85025 COMPLETE CBC W/AUTO DIFF WBC: CPT

## 2024-12-26 PROCEDURE — 82607 VITAMIN B-12: CPT

## 2024-12-28 LAB — BACTERIA UR CULT: NO GROWTH

## 2024-12-31 ENCOUNTER — LAB (OUTPATIENT)
Dept: LAB | Facility: LAB | Age: 74
End: 2024-12-31
Payer: MEDICARE

## 2024-12-31 DIAGNOSIS — K92.1 MELENA: ICD-10-CM

## 2024-12-31 DIAGNOSIS — E53.8 DEFICIENCY OF OTHER SPECIFIED B GROUP VITAMINS: ICD-10-CM

## 2024-12-31 DIAGNOSIS — D50.9 IRON DEFICIENCY ANEMIA, UNSPECIFIED: ICD-10-CM

## 2024-12-31 DIAGNOSIS — N39.0 URINARY TRACT INFECTION, SITE NOT SPECIFIED: ICD-10-CM

## 2024-12-31 PROCEDURE — 82274 ASSAY TEST FOR BLOOD FECAL: CPT

## 2025-01-03 LAB — HEMOCCULT STL QL IA: NEGATIVE

## 2025-01-07 ENCOUNTER — OFFICE VISIT (OUTPATIENT)
Age: 75
End: 2025-01-07
Payer: MEDICARE

## 2025-01-07 VITALS
BODY MASS INDEX: 27.32 KG/M2 | HEIGHT: 66 IN | DIASTOLIC BLOOD PRESSURE: 74 MMHG | WEIGHT: 170 LBS | SYSTOLIC BLOOD PRESSURE: 116 MMHG

## 2025-01-07 DIAGNOSIS — M48.062 SPINAL STENOSIS OF LUMBAR REGION WITH NEUROGENIC CLAUDICATION: Primary | ICD-10-CM

## 2025-01-07 PROCEDURE — 99213 OFFICE O/P EST LOW 20 MIN: CPT | Performed by: NEUROLOGICAL SURGERY

## 2025-01-07 PROCEDURE — 99024 POSTOP FOLLOW-UP VISIT: CPT | Performed by: NEUROLOGICAL SURGERY

## 2025-01-07 NOTE — PROGRESS NOTES
Patient Name: Rosemarie Grant : 1950        Date: 2025      Type of Appt: Post Op    Reason for appt: 11/15/24 Left and bilateral L4-5-S1 decompression left L5-S1 discectomy.     Pt last seen by Dr Hilton on 2024    Surgeries:11/15/24 Left and bilateral L4-5-S1 decompression left L5-S1 discectomy.     2024 5:30 PM [facility] call from [facility] .     11/15/24 Left and bilateral L4-5-S1 decompression left L5-S1 discectomy.      Patient has fever tachycardia and urinary tract infection.  CT scan abdomen pelvis was performed to rule out other issues.  Found a 3 cm fluid collection posterior to the surgical site in the lumbar area.  Radiology could not rule out abscess.  Postsurgical changes in the lumbar spine with 3 cm fluid collection in the overlying soft tissues infection cannot be excluded by CT appearance clinical correlation recommended.  The 3 cm rounded hypodensity and stranding and fluid in the midline lower back subcutaneous fat.     Report placed in epic.       12/10/2024 MRI lumbar spine subcutaneous seroma  Details     Reading Physician Reading Date Result Priority   Therese Blcak MD  [phone] 12/10/2024        Narrative & Impression  EXAMINATION:  MRI OF THE LUMBAR SPINE WITHOUT CONTRAST, 12/10/2024 9:31 am     TECHNIQUE:  Multiplanar multisequence MRI of the lumbar spine was performed without the  administration of intravenous contrast.     COMPARISON:  MRI of the lumbar spine with and without contrast, 10/03/2024.     HISTORY:  ORDERING SYSTEM PROVIDED HISTORY: Seroma of musculoskeletal structure after  musculoskeletal system procedure  TECHNOLOGIST PROVIDED HISTORY:  Reason for exam:->Abscess  What reading provider will be dictating this exam?->CRC     FINDINGS:  BONES/ALIGNMENT: Mild anterior wedge compression deformity in the L1  vertebral body.  The remainder of the vertebral body heights are preserved.  There is abnormal

## 2025-01-17 ENCOUNTER — HOSPITAL ENCOUNTER (OUTPATIENT)
Dept: PHYSICAL THERAPY | Age: 75
Setting detail: THERAPIES SERIES
Discharge: HOME | End: 2025-01-17
Payer: MEDICARE

## 2025-01-17 PROCEDURE — 97161 PT EVAL LOW COMPLEX 20 MIN: CPT

## 2025-01-17 NOTE — THERAPY EVALUATION
Physical Therapy Evaluation/Plan of Care   [facility]   [place] [id number]  Dept: [phone]  Dept Fax: [phone]  Loc: [phone]    Physical Therapy: Initial Evaluation    General Information    Patient: Rosemarie Grant (74 y.o.     female)   Examination Date: 2025   :  1950 ;    Confirmed: Yes MRN: [id number]  CSN: [id number]   Insurance: Payor: [facility] MEDICARE / Plan: [facility] MEDICARE ADVANTAGE / Product Type: *No Product type* /   Insurance ID: [id number] - (Medicare Managed)  PT Insurance Information: [facility] Medicare Secondary Insurance (if applicable):     Referring Physician: Jagdeep Caldwell PA-C       Visits to Date/Visits Approved:     No Show/Cancelled Appts: 0 / 0     Medical Diagnosis: Spinal stenosis of lumbar region with neurogenic claudication [M48.062]        Treatment Diagnosis: LBP, LE weakness     SUBJECTIVE:     Onset date: 11/15/2024    Subjective/ Mechanism of Injury: Patient reports to therapy for back pain. Reports she had decompression and disectomy 11/15/24. States she felt good initially after surgery, states currently getting more muscular and wound pain. Reports she bought a brace this morning. Stating increased pain and difficulty when standing / walking after sitting for 10-15 min. Reports restricting how much she is lifting and bending. Taking motrin and tylenol, will be f/u with pain management. Reports sitting increases upper back pain and walking increases low back and hip pain. Denies falls / no AD. Has tried exercises at home, but stopped due to increased pain. Has tried heat and ice. F/u with Dr. Hilton on .    Precautions/Contraindications/Restrictions: recent surgery           Changes in Bowel/Bladder Function: no  Saddle Anesthesia: no  Unexplained Weight Loss: no  Unrelenting Night Pain: no - but increased pain throughout the day  Sudden Weakness/Falls:

## 2025-01-22 ENCOUNTER — OFFICE VISIT (OUTPATIENT)
Age: 75
End: 2025-01-22
Payer: MEDICARE

## 2025-01-22 VITALS
TEMPERATURE: 97.1 F | HEART RATE: 82 BPM | OXYGEN SATURATION: 95 % | BODY MASS INDEX: 28.32 KG/M2 | WEIGHT: 170 LBS | HEIGHT: 65 IN

## 2025-01-22 DIAGNOSIS — Z98.890 HISTORY OF LUMBOSACRAL SPINE SURGERY: ICD-10-CM

## 2025-01-22 DIAGNOSIS — M47.817 LUMBOSACRAL SPONDYLOSIS WITHOUT MYELOPATHY: Primary | ICD-10-CM

## 2025-01-22 PROCEDURE — G2211 COMPLEX E/M VISIT ADD ON: HCPCS | Performed by: PHYSICAL MEDICINE & REHABILITATION

## 2025-01-22 PROCEDURE — 99214 OFFICE O/P EST MOD 30 MIN: CPT | Performed by: PHYSICAL MEDICINE & REHABILITATION

## 2025-01-22 PROCEDURE — 1160F RVW MEDS BY RX/DR IN RCRD: CPT | Performed by: PHYSICAL MEDICINE & REHABILITATION

## 2025-01-22 PROCEDURE — 1123F ACP DISCUSS/DSCN MKR DOCD: CPT | Performed by: PHYSICAL MEDICINE & REHABILITATION

## 2025-01-22 PROCEDURE — 99215 OFFICE O/P EST HI 40 MIN: CPT | Performed by: PHYSICAL MEDICINE & REHABILITATION

## 2025-01-22 PROCEDURE — 1159F MED LIST DOCD IN RCRD: CPT | Performed by: PHYSICAL MEDICINE & REHABILITATION

## 2025-01-22 PROCEDURE — 1125F AMNT PAIN NOTED PAIN PRSNT: CPT | Performed by: PHYSICAL MEDICINE & REHABILITATION

## 2025-01-22 RX ORDER — PREGABALIN 50 MG/1
50 CAPSULE ORAL 2 TIMES DAILY
Qty: 60 CAPSULE | Refills: 0 | Status: SHIPPED | OUTPATIENT
Start: 2025-01-22 | End: 2025-02-21

## 2025-01-22 ASSESSMENT — ENCOUNTER SYMPTOMS
CONSTIPATION: 0
NAUSEA: 0
DIARRHEA: 0
SHORTNESS OF BREATH: 0
BACK PAIN: 1

## 2025-01-22 NOTE — PROGRESS NOTES
getting an infection with perfect accuracy, but a joint decision was made between the patient and the physician to proceed at this time with the scheduled visits and procedures.     Advised her that any lab testing, imaging, or other diagnostic test results are best discussed in person in the office so that we can provide a clear explanation of their significance and best treatment based upon these results. It is her responsibility to make and keep a follow up appointment to discuss these test results in person to discuss the significance of the findings and appropriate follow-up steps. She expressed complete understanding and agreement with the entire plan as outlined above. Portions of this note may have been typed, auto-populated, dictated or transcribed by voice recognition resulting in errors, omissions, or close substitutions which may be missed despite careful proofreading. Please contact the author for any questions or concerns.    I am the primary provider for this patient's complex chronic pain condition that requires ongoing medical management. The nature of this condition and indicated treatment requires a longitudinal relationship and continual monitoring over time for appropriate safety and response. Shared goals were created and discussed including a reduction in pain intensity and improvement in ability to complete activities of daily living.     Follow up:  Return in about 1 month (around 2/22/2025) for reassessment of pain and symptoms.    Isreal David MD

## 2025-01-22 NOTE — PATIENT INSTRUCTIONS
Follow-up with primary care team/hematology for abnormal bone marrow signal seen on MRI L-spine 12/10/2024

## 2025-01-23 ENCOUNTER — TELEPHONE (OUTPATIENT)
Age: 75
End: 2025-01-23

## 2025-01-23 NOTE — TELEPHONE ENCOUNTER
ADELINE L345 ADONAY      NO AUTH REQUIRED  OK to schedule procedure approved as above.   Please note sides/levels approved and date range.   (If applicable, sides/levels approved may differ from those ordered)    TO BE SCHEDULED WITH

## 2025-01-24 ENCOUNTER — HOSPITAL ENCOUNTER (OUTPATIENT)
Dept: PHYSICAL THERAPY | Age: 75
Setting detail: THERAPIES SERIES
Discharge: HOME | End: 2025-01-24
Payer: MEDICARE

## 2025-01-24 PROCEDURE — 97110 THERAPEUTIC EXERCISES: CPT

## 2025-01-24 ASSESSMENT — PAIN SCALES - GENERAL: PAINLEVEL_OUTOF10: 6

## 2025-01-24 ASSESSMENT — PAIN DESCRIPTION - LOCATION: LOCATION: BACK;HIP

## 2025-01-24 ASSESSMENT — PAIN DESCRIPTION - ORIENTATION: ORIENTATION: LEFT

## 2025-01-24 NOTE — PROGRESS NOTES
Patient Name: Rosemarie Grant : 1950        Date: 2025      Type of Appt: Follow up    Reason for appt: 4 week Follow up    Pt last seen by  Dr Hilton 2025    Studies done: No New Studies     Surgeries: 11/15/24 Left and bilateral L4-5-S1 decompression left L5-S1 discectomy.     Conservative Treatments:  Physical Therapy: Yes  NSAID's: Yes  Narcotics: Yes  Muscle relaxants:   Epidural injections: No    [] Yes   [x]  No  Any Blood Thinners:      [] Aspirin   [] Eliquis   [] Xarelto   [] Pletal   [] Plavix    [] Warfarin   [] Coumadin     [] Yes   [x] No   Diabetic:   [] Yes   [x]  No If yes, prescribed insulin:      [] Yes  [x] No  weight loss medications:      [] Ozempic   [] Wegovy    [] Trulicity    [] Mounjaro         [] Yes   [x]  No Smoking :           [facility]  [address] [place] [address] [address]  [place] [address]  O:   F:         Patient: Rosemarie Grant  YOB: 1950  Date: 2025    The patient is a 74 y.o. female who presents today for follow up.     11/15/24 Left and bilateral L4-5-S1 decompression left L5-S1 discectomy.     Making progress.  The left lower extremity pain is gone the severe excruciating lower extremity pain is all gone.  She feels some weakness in her legs but is able to ambulate independently.  Needs assistance to go up on her toes and her heels.  Has pain to deep palpation mainly on the left side of the well-healed incision which may go over the right and into the hips.  Physical therapy is helping.    Exam she is walking slightly bent forward at the waist.  With encouragement she can straighten up with improved posture but does cause increased pain in the low back.  Needs to work on her posture and was instructed.  Continue physical therapy.  Seeing Dr. David pain management.    Recheck 2 months 15      KIAN HILTON MD

## 2025-01-24 NOTE — TELEPHONE ENCOUNTER
Please clarify, appears the patient had MRI LS Spine just performed 12/10/2024. Has something happened since 12/10/24 and our office visit on 1/22/25 that I should know about? I'm happy to explore her request for an MRI, but may be best to discuss her concerns in person at an earlier follow up visit.

## 2025-01-24 NOTE — PROGRESS NOTES
[facility]  Outpatient Physical Therapy    Treatment Note        Date: 2025  Patient: Rosemarie Grant  : 1950   Confirmed: Yes  MRN: [id number]  Referring Provider: Jagdeep Caldwell PA-C    Medical Diagnosis: Spinal stenosis of lumbar region with neurogenic claudication [M48.062]       Treatment Diagnosis: LBP, LE weakness    Visit Information:  Insurance: Payor: [facility] MEDICARE / Plan: [facility] MEDICARE ADVANTAGE / Product Type: *No Product type* /   PT Visit Information  PT Insurance Information: [employer] Medicare  Total # of Visits Approved: 8  Total # of Visits to Date: 1  Plan of Care/Certification Expiration Date: 25  No Show: 0  Progress Note Due Date: 25  Canceled Appointment: 0  Progress Note Counter:     Subjective Information:  Subjective: Pt reports cutting back on exercises at home.  HEP Compliance:  initiated today      Pain Screening  Patient Currently in Pain: Yes  Pain Assessment: 0-10  Pain Level: 6  Pain Location: Back, Hip  Pain Orientation: Left    Treatment:  Exercises:  Exercises  Exercise 1: LTR 3s x10  Exercise 2: TA holds 5s x10, TA gm with march x10 gonzalo  Exercise 4: Hooklying hip abd/add 5s x10 RTB  Exercise 6: static standing against wall 1' x2  Exercise 7: Ball roll up on wall x10  Exercise 8: Rows/lats YTB x10  Exercise 9: Piriformis str in supine 30s x2 gonzalo  Exercise 10: Hip flexor str at stairs 30s x3 gonzalo  Exercise 11: Seated hams str 30s x2 gonzalo  Exercise 20: HEP: LTR, piriformis str, TA gm, TA with march    Modalities:  Moist Heat (CPT 74725)  Patient Position: Seated  Number Minutes Moist Heat: 10'  Moist heat location: Low back  Post treatment skin assessment: Redness - no adverse reaction       Assessment:   Body Structures, Functions, Activity Limitations Requiring Skilled Therapeutic Intervention: Decreased functional mobility , Decreased ROM, Decreased body mechanics, Decreased strength, Decreased endurance, Decreased posture, Increased

## 2025-01-24 NOTE — TELEPHONE ENCOUNTER
Call placed to patient to schedule procedure. Appointment scheduled. Patient stated that she wants an MRI of her back ordered. Please advise.  ADELINE L345 MBB     NO AUTH REQUIRED

## 2025-01-27 ENCOUNTER — HOSPITAL ENCOUNTER (OUTPATIENT)
Dept: PHYSICAL THERAPY | Age: 75
Setting detail: THERAPIES SERIES
Discharge: HOME | End: 2025-01-27
Payer: MEDICARE

## 2025-01-27 PROCEDURE — 97110 THERAPEUTIC EXERCISES: CPT

## 2025-01-27 ASSESSMENT — PAIN DESCRIPTION - LOCATION: LOCATION: BACK

## 2025-01-27 ASSESSMENT — PAIN SCALES - GENERAL: PAINLEVEL_OUTOF10: 6

## 2025-01-27 NOTE — TELEPHONE ENCOUNTER
Call placed to patient message given as written, patient stated that she will wait until her appointment that is scheduled to discuss this further.    oral

## 2025-01-27 NOTE — PROGRESS NOTES
[facility]  Outpatient Physical Therapy   Treatment Note        Date: 2025  Patient: Rosemarie Grant  : 1950   Confirmed: Yes  MRN: [id number]  Referring Provider: Jagdeep Caldwell PA-C      Medical Diagnosis: Spinal stenosis of lumbar region with neurogenic claudication [M48.062]      Treatment Diagnosis: LBP, LE weakness    Visit Information:  Insurance: Payor: [facility] MEDICARE / Plan: [facility] MEDICARE ADVANTAGE / Product Type: *No Product type* /   PT Visit Information  PT Insurance Information: [facility] Medicare  Total # of Visits Approved: 8  Total # of Visits to Date: 2  Plan of Care/Certification Expiration Date: 25  No Show: 0  Progress Note Due Date: 25  Canceled Appointment: 0  Progress Note Counter:     Subjective Information:  Subjective: Patient reports she was very painful this morning, took medicine, states she thinks it is due to her mattress. Reports no pain with sitting, only with walking. Reports she felt good after last visit.  HEP Compliance:  [x] Good [] Fair [] Poor [] Reports not doing due to:    Pain Screening  Patient Currently in Pain: Yes  Pain Level: 6 (with walking)  Pain Location: Back    Treatment:  Exercises:  Exercises  Exercise 1: LTR 3s x10 reps  Exercise 2: TA holds 5s x15 reps ; with bridge x10 reps x 3 sec holds  Exercise 3: PPT x15 reps  Exercise 4: Hooklying hip abd, add, march RTB x15 reps  Exercise 6: static standing against wall 1' x2 (with towel roll b/t shoulders for postural cuing)  Exercise 7: Ball roll up on wall x10 reps x 5 sec holds  Exercise 9: Piriformis str seated 30s x 3 reps gonzalo  Exercise 10: modified gina str 3 reps x 20-30 sec holds, gonzalo  Exercise 11: Seated hams str 30s x3 reps, gonzalo  Exercise 12: scifit L2.0 x5 min for ROM  Exercise 13: SLR x10 reps, gonzalo  Exercise 20: HEP:continue current    Modalities:  Moist Heat (CPT 83324)  Patient Position: Seated  Number Minutes Moist Heat: 10'  Moist heat location: Low back  Post

## 2025-01-30 ENCOUNTER — HOSPITAL ENCOUNTER (OUTPATIENT)
Dept: PHYSICAL THERAPY | Age: 75
Setting detail: THERAPIES SERIES
Discharge: HOME | End: 2025-01-30
Payer: MEDICARE

## 2025-01-30 PROCEDURE — 97110 THERAPEUTIC EXERCISES: CPT

## 2025-01-30 NOTE — PROGRESS NOTES
[facility]  Outpatient Physical Therapy    Treatment Note        Date: 2025  Patient: Rosemarie Grant  : 1950   Confirmed: Yes  MRN: [id number]  Referring Provider: Jagdeep Caldwell PA-C    Medical Diagnosis: Spinal stenosis of lumbar region with neurogenic claudication [M48.062]       Treatment Diagnosis: LBP, LE weakness    Visit Information:  Insurance: Payor: [facility] MEDICARE / Plan: [facility] MEDICARE ADVANTAGE / Product Type: *No Product type* /   PT Visit Information  PT Insurance Information: [employer] Medicare  Total # of Visits Approved: 8  Total # of Visits to Date: 3  Plan of Care/Certification Expiration Date: 25  No Show: 0  Progress Note Due Date: 25  Canceled Appointment: 0  Progress Note Counter: 3/8    Subjective Information:  Subjective: pt states she doesnt have nerve pain , her LB and legs feel weak  HEP Compliance:  [x] Good [] Fair [] Poor [] Reports not doing due to:  Pain Screening  Patient Currently in Pain: Denies    Treatment:  Exercises:  Exercises  Exercise 1: LTR 3s x10 reps  Exercise 2: TA holds with bridge x10 reps x 3 sec holds  Exercise 3: PPT x15 reps  Exercise 4: Hooklying hip abd, add, march RTB x15 reps  Exercise 5: STS x 5  Exercise 7: Ball roll up on wall x10 reps x 5 sec holds  Exercise 8: Rows/lats YTB x10, Paloff press x 10 ea YTB  Exercise 9: Piriformis str seated 30s x 3 reps gonzalo  Exercise 10: modified gina str 3 reps x 20-30 sec holds, gonzalo  Exercise 11: Seated hams str 30s x3 reps, gonzalo  Exercise 12: scifit L2.0 x5 min for ROM  Exercise 13: SLR x10 reps, gonzalo  Exercise 14: Lumbar ext with yellow pball x 10  Exercise 20: HEP:continue current       Manual: NA          Modalities:  Moist Heat (CPT 39649)  Patient Position: Seated  Number Minutes Moist Heat: 10'  Moist heat location: Low back  Post treatment skin assessment: Redness - no adverse reaction       *Indicates exercise, modality, or manual techniques to be initiated when

## 2025-02-03 ENCOUNTER — HOSPITAL ENCOUNTER (OUTPATIENT)
Dept: PHYSICAL THERAPY | Age: 75
Setting detail: THERAPIES SERIES
Discharge: HOME | End: 2025-02-03
Payer: MEDICARE

## 2025-02-03 PROCEDURE — 97110 THERAPEUTIC EXERCISES: CPT

## 2025-02-03 ASSESSMENT — PAIN DESCRIPTION - LOCATION: LOCATION: BACK

## 2025-02-03 ASSESSMENT — PAIN SCALES - GENERAL: PAINLEVEL_OUTOF10: 3

## 2025-02-03 NOTE — PROGRESS NOTES
[facility]  Outpatient Physical Therapy   Treatment Note        Date: 2/3/2025  Patient: Rosemarie Grant  : 1950   Confirmed: Yes  MRN: [id number]  Referring Provider: Jagdeep Calwdell PA-C      Medical Diagnosis: Spinal stenosis of lumbar region with neurogenic claudication [M48.062]      Treatment Diagnosis: LBP, LE weakness    Visit Information:  Insurance: Payor: [facility] MEDICARE / Plan: [facility] MEDICARE ADVANTAGE / Product Type: *No Product type* /   PT Visit Information  PT Insurance Information: [facility] Medicare  Total # of Visits Approved: 8  Total # of Visits to Date: 4  Plan of Care/Certification Expiration Date: 25  No Show: 0  Progress Note Due Date: 25  Canceled Appointment: 0  Progress Note Counter:     Subjective Information:  Subjective: Patient reports working out and walking all weekend. Denies radicular pain, pain only in back.  HEP Compliance:  [x] Good [] Fair [] Poor [] Reports not doing due to:    Pain Screening  Patient Currently in Pain: Yes  Pain Level: 3  Pain Location: Back    Treatment:  Exercises:  Exercises  Exercise 1: LTR 5s x10 reps, gonzalo  Exercise 2: bridge 10 reps x 3 sec holds  Exercise 4: 2-way SLR x10 reps, each, gonzalo  Exercise 5: STS x10 reps  Exercise 6: static standing against wall x1' ; chest pulls YTB x10 res ; gonzalo ER YTB x10 reps  - with towel roll b/t shoulders  Exercise 7: Ball roll up on wall x10 reps x 5 sec holds  Exercise 8: Rows YTB x15 reps,  Exercise 9: Piriformis str seated 20-30s x 3 reps gonzalo  Exercise 11: Seated hams str 20s x3 reps, gonzalo  Exercise 12: scifit L2.5 x5 min for ROM  Exercise 14: Lumbar ext with yellow pball x10 reps x 3-5sec holds  Exercise 15: Paloff press x15 reps, gonzalo  Exercise 20: HEP:continue current + ham str, bridge, static erect standing ; with chest pulls/ ognzalo ER    Modalities:  Moist Heat (CPT 79048)  Patient Position: Seated  Number Minutes Moist Heat: 10'  Moist heat location: Low back  Post treatment skin

## 2025-02-04 ENCOUNTER — OFFICE VISIT (OUTPATIENT)
Age: 75
End: 2025-02-04
Payer: MEDICARE

## 2025-02-04 VITALS
SYSTOLIC BLOOD PRESSURE: 122 MMHG | WEIGHT: 180 LBS | HEIGHT: 66 IN | RESPIRATION RATE: 16 BRPM | BODY MASS INDEX: 28.93 KG/M2 | DIASTOLIC BLOOD PRESSURE: 78 MMHG

## 2025-02-04 DIAGNOSIS — M47.816 LUMBAR SPONDYLOSIS: Primary | ICD-10-CM

## 2025-02-04 PROCEDURE — 1125F AMNT PAIN NOTED PAIN PRSNT: CPT | Performed by: NEUROLOGICAL SURGERY

## 2025-02-04 PROCEDURE — 1159F MED LIST DOCD IN RCRD: CPT | Performed by: NEUROLOGICAL SURGERY

## 2025-02-04 PROCEDURE — 99213 OFFICE O/P EST LOW 20 MIN: CPT | Performed by: NEUROLOGICAL SURGERY

## 2025-02-04 PROCEDURE — 3074F SYST BP LT 130 MM HG: CPT | Performed by: NEUROLOGICAL SURGERY

## 2025-02-04 PROCEDURE — 3078F DIAST BP <80 MM HG: CPT | Performed by: NEUROLOGICAL SURGERY

## 2025-02-04 PROCEDURE — 1123F ACP DISCUSS/DSCN MKR DOCD: CPT | Performed by: NEUROLOGICAL SURGERY

## 2025-02-04 NOTE — PATIENT INSTRUCTIONS
We're looking forward to seeing you at your upcoming appointment     Now you can save time and skip the clipboard! Pre-Registration lets you complete your appointment paperwork and pay your copay directly from your MyChart. Then, when you arrive for your appointment, simply stop at the  to let us know you have arrived.     We are committed to providing you with exceptional care and look forward to seeing you at your upcoming appointment. If you have any questions or concerns, please do not hesitate to reach out to us.       [facility]

## 2025-02-05 ENCOUNTER — OFFICE VISIT (OUTPATIENT)
Dept: CARDIOLOGY CLINIC | Age: 75
End: 2025-02-05
Payer: MEDICARE

## 2025-02-05 VITALS
BODY MASS INDEX: 27.04 KG/M2 | DIASTOLIC BLOOD PRESSURE: 80 MMHG | HEART RATE: 100 BPM | WEIGHT: 165 LBS | SYSTOLIC BLOOD PRESSURE: 156 MMHG | RESPIRATION RATE: 16 BRPM | OXYGEN SATURATION: 98 %

## 2025-02-05 DIAGNOSIS — I10 ESSENTIAL (PRIMARY) HYPERTENSION: Primary | ICD-10-CM

## 2025-02-05 PROCEDURE — 3077F SYST BP >= 140 MM HG: CPT | Performed by: INTERNAL MEDICINE

## 2025-02-05 PROCEDURE — 3079F DIAST BP 80-89 MM HG: CPT | Performed by: INTERNAL MEDICINE

## 2025-02-05 PROCEDURE — 1159F MED LIST DOCD IN RCRD: CPT | Performed by: INTERNAL MEDICINE

## 2025-02-05 PROCEDURE — 1123F ACP DISCUSS/DSCN MKR DOCD: CPT | Performed by: INTERNAL MEDICINE

## 2025-02-05 PROCEDURE — 99214 OFFICE O/P EST MOD 30 MIN: CPT | Performed by: INTERNAL MEDICINE

## 2025-02-05 RX ORDER — ASPIRIN 81 MG/1
81 TABLET, CHEWABLE ORAL DAILY
Qty: 30 TABLET | Refills: 3 | Status: SHIPPED | OUTPATIENT
Start: 2025-02-05

## 2025-02-05 ASSESSMENT — ENCOUNTER SYMPTOMS
VOMITING: 0
NAUSEA: 0
CONSTIPATION: 0
SHORTNESS OF BREATH: 0
ABDOMINAL PAIN: 0
COLOR CHANGE: 0
CHEST TIGHTNESS: 0
APNEA: 0
RHINORRHEA: 0
WHEEZING: 0
DIARRHEA: 0
EYE REDNESS: 0
COUGH: 0

## 2025-02-05 NOTE — PROGRESS NOTES
Pulmonary:      Effort: Pulmonary effort is normal.      Breath sounds: Normal breath sounds.   Abdominal:      General: Abdomen is flat. Bowel sounds are normal.      Palpations: Abdomen is soft.   Musculoskeletal:         General: Normal range of motion.      Cervical back: Normal range of motion and neck supple.   Skin:     General: Skin is warm.   Neurological:      General: No focal deficit present.      Mental Status: She is alert and oriented to person, place, and time. Mental status is at baseline.   Psychiatric:         Mood and Affect: Mood normal.        EKG: normal sinus rhythm    No orders of the defined types were placed in this encounter.        The ASCVD Risk score (Sage MCKEON, et al., 2019) failed to calculate for the following reasons:    The patient has a prior MI or stroke diagnosis     ASSESSMENT:     Diagnosis Orders   1. Essential (primary) hypertension            PLAN:     Abnormal CT abdomen pelvis  CT 8/9/2024 reported as calcification on the iliac arteries  I would like to obtain an ARACELI  Patient endorses leg pain which could be due to sciatica nerve versus lower extremity arterial disease  Start patient on aspirin  Continue atorvastatin  If patient has claudication symptoms on next appointment and patient has recovered from back surgery I may consider bringing her to the Cath Lab for peripheral angiogram      Chest pain  Patient denies any more episodes of chest pain  Nuclear stress test 10/23/2024 no ischemia EF 76%  TTE 10/23/2024 EF 60 to 65%    Hypertension  Continue Coreg  Continue Norvasc  Patient not taking hydrochlorothiazide, patient still on potassium pills  I would like to obtain a BMP in a week and if potassium is low I would recommend patient to restart taking potassium supplement        This note was transcribed using voice recognition software.  Every effort was made to ensure accuracy; however, inadvertent computerized transcription errors may be present.

## 2025-02-06 ENCOUNTER — HOSPITAL ENCOUNTER (OUTPATIENT)
Dept: PHYSICAL THERAPY | Age: 75
Setting detail: THERAPIES SERIES
Discharge: HOME | End: 2025-02-06
Payer: MEDICARE

## 2025-02-06 NOTE — PROGRESS NOTES
Therapy                            Cancellation/No-show Note    Date: 2025  Patient: Rosemarie Grant (74 y.o. female)  : 1950  MRN:  [id number]  Referring Physician: Jagdeep Caldwell PA-C    Medical Diagnosis: Spinal stenosis of lumbar region with neurogenic claudication [M48.062]      Visit Information:  Insurance: Payor: [facility] MEDICARE / Plan: [facility] MEDICARE ADVANTAGE / Product Type: *No Product type* /   Visits to Date: 4   No Show/Cancelled Appts: 0 /       For today's appointment patient:  [x]  Cancelled  []  Rescheduled appointment  []  No-show   []  Called pt to remind of next appointment     Reason given by patient:  []  Patient ill  []  Conflicting appointment  []  No transportation    []  Conflict with work  []  No reason given  [x]  Other:  Personal reasons    [x] Pt has future appointments scheduled, no follow up needed  [] Pt requests to be on hold.    Reason:   If > 2 weeks please discuss with therapist.  [] Therapist to call pt for follow up  [] [place] to call pt to reschedule   Comments:       Signature: Electronically signed by Hallie Natarajan PTA on 25 at 10:27 AM EST

## 2025-02-10 ENCOUNTER — HOSPITAL ENCOUNTER (OUTPATIENT)
Dept: PHYSICAL THERAPY | Age: 75
Setting detail: THERAPIES SERIES
Discharge: HOME | End: 2025-02-10
Payer: MEDICARE

## 2025-02-10 NOTE — PROGRESS NOTES
Therapy                            Cancellation/No-show Note      Date: 02/10/2025  Patient: Rosemarie Grant (74 y.o. female)  : 1950  MRN:  [id number]  Referring Physician: Jagdeep Caldwell PA-C    Medical Diagnosis: Spinal stenosis of lumbar region with neurogenic claudication [M48.062]      Visit Information:  Visits to Date 4   No Show/Cancelled Appts: 0  2      For today's appointment patient:  [x]  Cancelled  []  Rescheduled appointment  []  No-show   []  Called pt to remind of next appointment     Reason given by patient:  []  Patient ill  [x]  Conflicting appointment  []  No transportation    []  Conflict with work  []  No reason given  []  Other:      [x] Pt has future appointments scheduled, no follow up needed  [] Pt requests to be on hold.    Reason:   If > 2 weeks please discuss with therapist.  [] Therapist to call pt for follow up  []  to call to re-schedule      Comments:       Signature: Electronically signed by ZORAIDA BUTLER PTA on 2/10/25 at 2:14 PM EST

## 2025-02-13 ENCOUNTER — HOSPITAL ENCOUNTER (OUTPATIENT)
Dept: PHYSICAL THERAPY | Age: 75
Setting detail: THERAPIES SERIES
Discharge: HOME | End: 2025-02-13
Payer: MEDICARE

## 2025-02-13 NOTE — PROGRESS NOTES
Therapy                            Cancellation/No-show Note    Date: 2025  Patient: Rosemarie Grant (74 y.o. female)  : 1950  MRN:  [id number]  Referring Physician: Jagdeep Caldwell PA-C    Medical Diagnosis: Spinal stenosis of lumbar region with neurogenic claudication [M48.062]      Visit Information:  Insurance: Payor: [facility] MEDICARE / Plan: [facility] MEDICARE ADVANTAGE / Product Type: *No Product type* /   Visits to Date: 4   No Show/Cancelled Appts: 0 / 3      For today's appointment patient:  [x]  Cancelled  []  Rescheduled appointment  []  No-show   []  Called pt to remind of next appointment     Reason given by patient:  []  Patient ill  []  Conflicting appointment  []  No transportation    []  Conflict with work  [x]  No reason given  []  Other:      [x] Pt has future appointments scheduled, no follow up needed  [] Pt requests to be on hold.    Reason:   If > 2 weeks please discuss with therapist.  [] Therapist to call pt for follow up  [] [place] to call pt to reschedule   Comments:       Signature: Electronically signed by Verenice Carroll PTA on 25 at 1:22 PM EST

## 2025-02-17 ENCOUNTER — HOSPITAL ENCOUNTER (OUTPATIENT)
Dept: PHYSICAL THERAPY | Age: 75
Setting detail: THERAPIES SERIES
Discharge: HOME | End: 2025-02-17
Payer: MEDICARE

## 2025-02-17 NOTE — PLAN OF CARE
[address]     [place] [address]      Phone:[phone]      PHYSICAL THERAPY PLAN OF CARE     [] Certification  [] Recertification []  Plan of Care  [] Progress Note [x] Discharge      Referring Provider: Jagdeep Caldwell PA-C    From:  Danielle Curtis, PT, DPT     Patient: Rosemarie Grant (74 y.o. female) : 1950 Date: 2025   Medical Diagnosis: Spinal stenosis of lumbar region with neurogenic claudication [M48.062]    Treatment Diagnosis: LBP, LE weakness    Plan of Care/Certification Expiration Date: 25   Progress Report Period from: 25  to 2025    Visits to Date: 4 No Show: 0 Cancelled Appts: 4    OBJECTIVE:   Long Term Goals - Time Frame for Long Term Goals : 4 weeks  Goals Current/ Discharge status Status   Long Term Goal 1: Patient will demonstrate >/=25% improvement in lumbar mobility to improve ADLs.    Nt due to unexpected discharge  Not Met    Long Term Goal 2: Patient will improve hip strength to 5/5 for improved WBing tolerance for increased muscular endurance.    Nt due to unexpected discharge  Not met    Long Term Goal 3: Paitent will report minimal to no pain with STS tranfers to demonstrate improved function and QOL. LTG 3 Current Status:: : STS x 5 w/o UEs with minimal pain   Not met    Long Term Goal 4: Patient will improve SANDIP by >/=10 points to demonstrate improved subjective function.  Nt due to unexpected discharge    Not met      Assessment: Pt did not return to PT after 2/3/25 unable to determine functional outcomes d/t unexpected D/C.    Pt called 25 to cancel appts and requested discharge due to feeling better.                 Patient Status:[] Continue/ Initiate plan of Care     [x] Discharge PT.  Recommend pt continue with HEP.      [] Additional visits requested, Please re-certify for additional visits:     [] Hold     Objective information provided by: Electronically signed by Shauna Butts PTA on 25 at 2:10 PM

## 2025-02-17 NOTE — PROGRESS NOTES
Therapy                            Cancellation/No-show Note    Date: 2025  Patient: Rosemarie Grant (74 y.o. female)  : 1950  MRN:  [id number]  Referring Physician: Jagdeep Caldwell PA-C    Medical Diagnosis: Spinal stenosis of lumbar region with neurogenic claudication [M48.062]      Visit Information:  Insurance: Payor: [facility] MEDICARE / Plan: [facility] MEDICARE ADVANTAGE / Product Type: *No Product type* /   Visits to Date: 4   No Show/Cancelled Appts: 0 /       For today's appointment patient:  [x]  Cancelled  []  Rescheduled appointment  []  No-show   []  Called pt to remind of next appointment     Reason given by patient:  []  Patient ill  []  Conflicting appointment  []  No transportation    []  Conflict with work  []  No reason given  [x]  Other: pt feeling better and requesting discharge.     [] Pt has future appointments scheduled, no follow up needed  [] Pt requests to be on hold.    Reason:   If > 2 weeks please discuss with therapist.  [] Therapist to call pt for follow up  []  to call pt to reschedule   Comments:   discharge     Signature: Electronically signed by Shauna Butts PTA on 25 at 2:09 PM EST

## 2025-03-26 ENCOUNTER — HOSPITAL ENCOUNTER (EMERGENCY)
Facility: HOSPITAL | Age: 75
Discharge: HOME | End: 2025-03-26
Payer: MEDICARE

## 2025-03-26 ENCOUNTER — APPOINTMENT (OUTPATIENT)
Dept: RADIOLOGY | Facility: HOSPITAL | Age: 75
End: 2025-03-26
Payer: MEDICARE

## 2025-03-26 VITALS
RESPIRATION RATE: 18 BRPM | HEIGHT: 67 IN | HEART RATE: 64 BPM | DIASTOLIC BLOOD PRESSURE: 81 MMHG | OXYGEN SATURATION: 98 % | SYSTOLIC BLOOD PRESSURE: 186 MMHG | WEIGHT: 173 LBS | BODY MASS INDEX: 27.15 KG/M2 | TEMPERATURE: 97.3 F

## 2025-03-26 DIAGNOSIS — M25.552 LEFT HIP PAIN: Primary | ICD-10-CM

## 2025-03-26 DIAGNOSIS — M54.32 SCIATICA OF LEFT SIDE: ICD-10-CM

## 2025-03-26 PROCEDURE — 96372 THER/PROPH/DIAG INJ SC/IM: CPT | Performed by: PHYSICIAN ASSISTANT

## 2025-03-26 PROCEDURE — 2500000004 HC RX 250 GENERAL PHARMACY W/ HCPCS (ALT 636 FOR OP/ED): Performed by: PHYSICIAN ASSISTANT

## 2025-03-26 PROCEDURE — 99284 EMERGENCY DEPT VISIT MOD MDM: CPT | Performed by: PHYSICIAN ASSISTANT

## 2025-03-26 PROCEDURE — 99284 EMERGENCY DEPT VISIT MOD MDM: CPT

## 2025-03-26 PROCEDURE — 73502 X-RAY EXAM HIP UNI 2-3 VIEWS: CPT | Mod: LT

## 2025-03-26 PROCEDURE — 73502 X-RAY EXAM HIP UNI 2-3 VIEWS: CPT | Mod: LEFT SIDE | Performed by: RADIOLOGY

## 2025-03-26 RX ORDER — METHYLPREDNISOLONE 4 MG/1
TABLET ORAL
Qty: 21 TABLET | Refills: 0 | Status: SHIPPED | OUTPATIENT
Start: 2025-03-26 | End: 2025-04-01

## 2025-03-26 RX ORDER — ACETAMINOPHEN 500 MG
1000 TABLET ORAL EVERY 6 HOURS PRN
Qty: 30 TABLET | Refills: 0 | Status: SHIPPED | OUTPATIENT
Start: 2025-03-26 | End: 2025-04-05

## 2025-03-26 RX ORDER — KETOROLAC TROMETHAMINE 15 MG/ML
7.5 INJECTION, SOLUTION INTRAMUSCULAR; INTRAVENOUS ONCE
Status: COMPLETED | OUTPATIENT
Start: 2025-03-26 | End: 2025-03-26

## 2025-03-26 RX ADMIN — KETOROLAC TROMETHAMINE 7.5 MG: 15 INJECTION, SOLUTION INTRAMUSCULAR; INTRAVENOUS at 20:48

## 2025-03-26 ASSESSMENT — PAIN - FUNCTIONAL ASSESSMENT: PAIN_FUNCTIONAL_ASSESSMENT: 0-10

## 2025-03-26 ASSESSMENT — PAIN SCALES - GENERAL
PAINLEVEL_OUTOF10: 8
PAINLEVEL_OUTOF10: 8

## 2025-03-26 ASSESSMENT — COLUMBIA-SUICIDE SEVERITY RATING SCALE - C-SSRS
1. IN THE PAST MONTH, HAVE YOU WISHED YOU WERE DEAD OR WISHED YOU COULD GO TO SLEEP AND NOT WAKE UP?: NO
6. HAVE YOU EVER DONE ANYTHING, STARTED TO DO ANYTHING, OR PREPARED TO DO ANYTHING TO END YOUR LIFE?: NO
2. HAVE YOU ACTUALLY HAD ANY THOUGHTS OF KILLING YOURSELF?: NO

## 2025-03-26 ASSESSMENT — PAIN DESCRIPTION - LOCATION
LOCATION: HIP
LOCATION: HIP

## 2025-03-26 ASSESSMENT — PAIN DESCRIPTION - ORIENTATION
ORIENTATION: LEFT
ORIENTATION: LEFT

## 2025-03-26 NOTE — Clinical Note
Collin Lazar was seen and treated in our emergency department on 3/26/2025.  She may return to work on 03/28/2025.       If you have any questions or concerns, please don't hesitate to call.      Tramaine Richard PA-C

## 2025-03-27 NOTE — ED PROVIDER NOTES
HPI   Chief Complaint   Patient presents with    Hip Pain     Pt endorse left hip pain and reports recent back surgery apprx 4 months prior to arrival. Pt endorses shooting pain down the leg.       HPI  Patient is a 74-year-old female with a past medical history of hypertension, rheumatoid arthritis, she is status post disc herniation surgery about 4 months ago that presents to the ED today for evaluation of left-sided hip/groin pain.  Patient had complete resolution of pain after surgery.  This pain started about 2 weeks ago and has progressively worsened.  She denies injury.  Pain starts in the left hip and radiates down the back of her left leg.  Patient reports this pain feels similar to when before she had surgery and had nerve pain.  Denies fever, chills, nausea, vomiting, shortness of breath, chest pain, dysuria, hematuria, saddle anesthesia, loss of bowel or bladder, lower extremity paresthesias.  Taking Tylenol and ibuprofen at home without relief.    Patient History   Past Medical History:   Diagnosis Date    Arthritis     Hypertension     Personal history of other diseases of the circulatory system     History of hypertension    Personal history of other diseases of the musculoskeletal system and connective tissue     History of rheumatoid arthritis    Personal history of other malignant neoplasm of bronchus and lung     History of malignant neoplasm of lung    Systemic lupus erythematosus, unspecified     Lupus     Past Surgical History:   Procedure Laterality Date    MR HEAD ANGIO WO IV CONTRAST  9/4/2020    MR HEAD ANGIO WO IV CONTRAST 9/4/2020 [facility]    OTHER SURGICAL HISTORY  01/16/2021    Lumpectomy    OTHER SURGICAL HISTORY  01/28/2022    Hysterectomy    US ASPIRATION INJECTION INTERMEDIATE JOINT  6/10/2021    US ASPIRATION INJECTION INTERMEDIATE JOINT 6/10/2021 [facility]     No family history on file.  Social History     Tobacco Use    Smoking status: Former     Types:  Cigarettes    Smokeless tobacco: Never   Vaping Use    Vaping status: Never Used   Substance Use Topics    Alcohol use: Never    Drug use: Never       Physical Exam   ED Triage Vitals   Temperature Heart Rate Respirations BP   03/26/25 1626 03/26/25 1626 03/26/25 1626 03/26/25 1626   36.3 °C (97.3 °F) 85 16 132/78      Pulse Ox Temp Source Heart Rate Source Patient Position   03/26/25 1626 03/26/25 1626 03/26/25 1804 03/26/25 1804   96 % Temporal Monitor Sitting      BP Location FiO2 (%)     03/26/25 1626 --     Right arm        Physical Exam  Vitals reviewed.   Constitutional:       General: She is not in acute distress.     Appearance: Normal appearance. She is not ill-appearing.   HENT:      Head: Normocephalic and atraumatic.   Eyes:      General: No scleral icterus.        Right eye: No discharge.         Left eye: No discharge.      Conjunctiva/sclera: Conjunctivae normal.   Cardiovascular:      Rate and Rhythm: Normal rate and regular rhythm.      Pulses: Normal pulses.      Heart sounds: Normal heart sounds. No murmur heard.  Pulmonary:      Effort: Pulmonary effort is normal. No respiratory distress.      Breath sounds: Normal breath sounds. No stridor.   Abdominal:      General: Abdomen is flat. There is no distension.      Tenderness: There is no abdominal tenderness. There is no guarding or rebound.   Musculoskeletal:      Cervical back: Normal and neck supple. No tenderness or bony tenderness. No pain with movement.      Thoracic back: No tenderness or bony tenderness.      Lumbar back: No tenderness or bony tenderness. Decreased range of motion. Positive left straight leg raise test. Negative right straight leg raise test.   Skin:     General: Skin is warm and dry.      Capillary Refill: Capillary refill takes less than 2 seconds.   Neurological:      General: No focal deficit present.      Mental Status: She is alert and oriented to person, place, and time.      Cranial Nerves: No cranial nerve  deficit.      Sensory: No sensory deficit.      Motor: No weakness.      Coordination: Coordination normal.   Psychiatric:         Mood and Affect: Mood normal.         Behavior: Behavior normal.           ED Course & MDM   Diagnoses as of 03/26/25 2202   Left hip pain   Sciatica of left side                 No data recorded     [place] Coma Scale Score: 15 (03/26/25 1631 : Simon Zendejas RN)                           Medical Decision Making  Patient is a 74-year-old female with a past medical history of hypertension, rheumatoid arthritis, she is status post disc herniation surgery about 4 months ago that presents to the ED today for evaluation of left-sided hip/groin pain.  On exam patient is uncomfortable appearing and in no acute distress.  Vital signs are stable.  Cardiopulmonary exam largely unremarkable.  No midline back tenderness.  Pain isolated over left hip/muscles.  She has a positive straight leg raise.  Limb is neurovascularly intact.  Differential includes but is not limited to muscle strain, sciatica, arthritis.  Low suspicion for acute spinal injury given isolated hip tenderness.  Low suspicion for bony abnormality given lack of traumatic injury.  Will evaluate with a x-ray.  Patient's pain was treated in the ED with 7.5 mg of IM Toradol.  On reevaluation she is able to walk and reports that it took the edge off her pain.  She would like to go home.  X-rays negative for acute bony abnormality.  It does show mild stool burden.  I wonder if this is exacerbating her pain.  Given positive straight leg raise negative imaging and no red flag symptoms of back pain we will treat as if this is a lumbar/hip strain with sciatica.  Patient is agreeable to this and would prefer to follow-up outpatient with her orthopedics.  She passed an ambulation trial.    Social determinants of health affecting care:  None     I independently reviewed all imaging and labs.    Patient was informed of the aforementioned  findings.  Symptoms are felt to be due to muscle strain with sciatica.  Patient will be prescribed Medrol Dosepak and Tylenol.     At this time, low suspicion for an acute process that would necessitate further emergent workup, urgent specialist consultation, or hospitalization.  Based on clinical workup, the disposition of discharge is appropriate.  Advised patient to pursue close follow-up with PCP.  Patient was provided with appropriate information to assist in obtaining the proper follow-up.  Discussed strict return to ED protocols with patient, including low threshold to return for changing/worsening symptoms.  Patient demonstrated understanding and agreement with the plan of care, and all questions answered prior to discharge.  Patient stable at time of discharge.     --------------------------------------------------------------------------------------------------------------------------------------------------------------------------------------------------------------------------    This note was dictated using a speech recognition program.  While an attempt was made at proof reading to minimize errors, minor errors in transcription may be present call for questions.     Procedure  Procedures     Tramaine Richard PA-C  03/26/25 [id number]

## 2025-03-27 NOTE — DISCHARGE INSTRUCTIONS
You were seen in the emergency department for evaluation of left hip pain that radiates down your leg.    Your x-ray showed no abnormalities to your hip.  Does show mild stool burden.    You were prescribed Medrol Dosepak and Tylenol.  You are given a referral to see orthopedics.      Please follow-up with your primary care provider.  If you do not have a primary care provider you can call [phone] to make an appointment.    Please do not hesitate to return to the ED if symptoms change or worsen.

## 2025-04-24 ENCOUNTER — PROCEDURE VISIT (OUTPATIENT)
Age: 75
End: 2025-04-24
Payer: MEDICARE

## 2025-04-24 VITALS
OXYGEN SATURATION: 96 % | HEIGHT: 65 IN | HEART RATE: 73 BPM | TEMPERATURE: 97.4 F | BODY MASS INDEX: 27.49 KG/M2 | SYSTOLIC BLOOD PRESSURE: 144 MMHG | DIASTOLIC BLOOD PRESSURE: 78 MMHG | WEIGHT: 165 LBS

## 2025-04-24 DIAGNOSIS — M47.817 LUMBOSACRAL SPONDYLOSIS WITHOUT MYELOPATHY: Primary | ICD-10-CM

## 2025-04-24 DIAGNOSIS — M54.16 LUMBAR RADICULOPATHY: ICD-10-CM

## 2025-04-24 PROCEDURE — 64493 INJ PARAVERT F JNT L/S 1 LEV: CPT | Performed by: PHYSICAL MEDICINE & REHABILITATION

## 2025-04-24 PROCEDURE — 64494 INJ PARAVERT F JNT L/S 2 LEV: CPT | Performed by: PHYSICAL MEDICINE & REHABILITATION

## 2025-04-24 PROCEDURE — 99213 OFFICE O/P EST LOW 20 MIN: CPT | Performed by: PHYSICAL MEDICINE & REHABILITATION

## 2025-04-24 RX ORDER — BUPIVACAINE HYDROCHLORIDE 5 MG/ML
1 INJECTION, SOLUTION PERINEURAL ONCE
Status: COMPLETED | OUTPATIENT
Start: 2025-04-24 | End: 2025-04-24

## 2025-04-24 RX ORDER — BETAMETHASONE SODIUM PHOSPHATE AND BETAMETHASONE ACETATE 3; 3 MG/ML; MG/ML
3 INJECTION, SUSPENSION INTRA-ARTICULAR; INTRALESIONAL; INTRAMUSCULAR; SOFT TISSUE ONCE
Status: COMPLETED | OUTPATIENT
Start: 2025-04-24 | End: 2025-04-24

## 2025-04-24 RX ADMIN — BUPIVACAINE HYDROCHLORIDE 5 MG: 5 INJECTION, SOLUTION PERINEURAL at 14:28

## 2025-04-24 RX ADMIN — BETAMETHASONE SODIUM PHOSPHATE AND BETAMETHASONE ACETATE 3 MG: 3; 3 INJECTION, SUSPENSION INTRA-ARTICULAR; INTRALESIONAL; INTRAMUSCULAR at 14:27

## 2025-04-24 ASSESSMENT — ENCOUNTER SYMPTOMS
CONSTIPATION: 0
DIARRHEA: 0
BACK PAIN: 1
SHORTNESS OF BREATH: 0
NAUSEA: 0

## 2025-04-24 ASSESSMENT — PAIN DESCRIPTION - LOCATION
LOCATION: BACK
LOCATION: BACK

## 2025-04-24 ASSESSMENT — PAIN SCALES - GENERAL
PAINLEVEL_OUTOF10: 0
PAINLEVEL_OUTOF10: 4

## 2025-04-24 NOTE — PROGRESS NOTES
Diagnostic Lumbar Medial Branch Blocks      Patient Name: Rosemarie Grant   : 1950  Date: 2025      Provider: Isreal David MD      Procedure: Left Lumbar L4/5 L5/S1 diagnostic injections (L Lumbar L3 and L4 medial branch blocks and L5 dorsal ramus block)    Rosemarie Grant is here today for interventional pain management. Preoperatively, the patient presents with symptoms and physical exam findings consistent with lumbar facet zygapophyseal joint mediated pain. She has had persistent pain that limits her function and activities of daily living. The pain is persistent despite conservative measures. She has significant functional and psychological impairment due to this condition. Given her symptoms, physical exam findings, impairment in activities of daily living, and lack of response to conservative measures, consideration for lumbar medial branch blocks was given. Discussed the risks of the procedure including, but not limited to, bleeding, infection, worsened pain, damage to surrounding structures, side effects, toxicity, allergic reactions to medications used, immune and stress-response dysfunction, fat necrosis, avascular necrosis, skin pigmentation changes, blood sugar elevation, headache, vision changes, need for surgery, as well as catastrophic injury such as vision loss, paralysis, stroke, spinal cord infarction or injury, intrathecal injection, spinal cord puncture, arachnoiditis, bowel or bladder incontinence, loss of use of the legs, ventilator dependence, and death. Discussed the risks, benefits, alternative procedures, and alternatives to the procedure including no procedure at all. Discussed that we cannot undo any permanent neurologic damage or change the course of any underlying disease. After thorough discussion, patient expressed understanding and willingness to proceed. Written consent was obtained and is in the chart. Verbal consent to proceed was obtained.    Standard ASIPP

## 2025-04-24 NOTE — PROGRESS NOTES
Rosemarie Grant  (1950)    4/24/2025    Subjective:     Rosemarie Grant is 74 y.o. female who complains today of:    Chief Complaint   Patient presents with    Back Pain     Bilateral lumbar     No other tests, therapy or updates from other physicians, no ER visits    Neck pain is currently a 0/10, no current pain. Resolved with cervical epidural done 2020. There are no other associated symptoms or contextual factors. She denies any new weakness, saddle anesthesia, bowel or bladder dysfunction, or falls.    Low back pain is currently a 6/10 on NRS pain scale.  It gets up to a 10/10.  Located in the left low back and down the left leg.  It has been a constant ache for over one year. Right leg is fine. There are no other associated symptoms or contextual factors. She denies any new weakness, saddle anesthesia, bowel or bladder dysfunction, or falls.     Allergies:  Naproxen, Toradol [ketorolac tromethamine], and Ultram [tramadol]    Past Medical History:   Diagnosis Date    Arthritis     Cancer (HCC)     lung cancer    Cerebral artery occlusion with cerebral infarction (HCC)     Hyperlipidemia     Hypertension     Osteoarthritis     Spinal stenosis      Past Surgical History:   Procedure Laterality Date    AXILLARY SURGERY Right 12/21/2016    DR ANAM HILLS    BUNIONECTOMY Right     COLONOSCOPY      HYSTERECTOMY (CERVIX STATUS UNKNOWN)      LAMINECTOMY Left 11/15/2024    Left and bilateral L4-5-S1 decompression left L5-S1 discectomy. performed by Lydia [place] [facility]    LYMPH NODE BIOPSY Left     2010    LYMPH NODE DISSECTION Right 12/21/2016    EXCISION  OF RIGHT AXILLARY MASS performed by Anam Hills MD at [facility]    TONSILLECTOMY       Family History   Problem Relation Age of Onset    Arthritis Sister     Asthma Maternal Cousin      Social History     Socioeconomic History    Marital status:      Spouse name: Not on file    Number of children: Not on file    Years of education: Not

## 2025-04-28 ENCOUNTER — TELEPHONE (OUTPATIENT)
Age: 75
End: 2025-04-28

## 2025-04-28 NOTE — TELEPHONE ENCOUNTER
LEFT LUMBAR L4/5, L5/S1 TFESI UNDER FLUOROSCOPY XR  NO AUTH REQUIRED      OK to schedule procedure approved as above.   Please note sides/levels approved and date range.   (If applicable, sides/levels approved may differ from those ordered)       TO BE SCHEDULED WITH DR. RICE

## 2025-05-01 ENCOUNTER — HOSPITAL ENCOUNTER (OUTPATIENT)
Dept: RADIOLOGY | Facility: HOSPITAL | Age: 75
Discharge: HOME | End: 2025-05-01
Payer: MEDICARE

## 2025-05-01 DIAGNOSIS — N28.1 RENAL CYST: ICD-10-CM

## 2025-05-01 PROCEDURE — 76770 US EXAM ABDO BACK WALL COMP: CPT | Performed by: RADIOLOGY

## 2025-05-01 PROCEDURE — 76770 US EXAM ABDO BACK WALL COMP: CPT

## 2025-05-05 DIAGNOSIS — Z00.00 HEALTHCARE MAINTENANCE: ICD-10-CM

## 2025-05-05 RX ORDER — NITROFURANTOIN MACROCRYSTALS 50 MG/1
50 CAPSULE ORAL EVERY 12 HOURS
Qty: 4 CAPSULE | Refills: 0 | Status: SHIPPED | OUTPATIENT
Start: 2025-05-05 | End: 2025-05-07

## 2025-05-14 ENCOUNTER — APPOINTMENT (OUTPATIENT)
Dept: UROLOGY | Facility: CLINIC | Age: 75
End: 2025-05-14
Payer: MEDICARE

## 2025-05-14 VITALS
BODY MASS INDEX: 27 KG/M2 | HEART RATE: 87 BPM | RESPIRATION RATE: 18 BRPM | HEIGHT: 67 IN | SYSTOLIC BLOOD PRESSURE: 137 MMHG | WEIGHT: 172 LBS | DIASTOLIC BLOOD PRESSURE: 72 MMHG

## 2025-05-14 DIAGNOSIS — N39.43 DRIBBLING OF URINE: ICD-10-CM

## 2025-05-14 DIAGNOSIS — N39.41 URGE INCONTINENCE OF URINE: ICD-10-CM

## 2025-05-14 DIAGNOSIS — N28.1 RENAL CYST: Primary | ICD-10-CM

## 2025-05-14 LAB
POC APPEARANCE, URINE: CLEAR
POC BILIRUBIN, URINE: NEGATIVE
POC BLOOD, URINE: NEGATIVE
POC COLOR, URINE: YELLOW
POC GLUCOSE, URINE: NEGATIVE MG/DL
POC KETONES, URINE: NEGATIVE MG/DL
POC LEUKOCYTES, URINE: NEGATIVE
POC NITRITE,URINE: NEGATIVE
POC PH, URINE: 7 PH
POC PROTEIN, URINE: ABNORMAL MG/DL
POC SPECIFIC GRAVITY, URINE: 1.01
POC UROBILINOGEN, URINE: 0.2 EU/DL

## 2025-05-14 PROCEDURE — 52000 CYSTOURETHROSCOPY: CPT | Performed by: UROLOGY

## 2025-05-14 PROCEDURE — 81003 URINALYSIS AUTO W/O SCOPE: CPT | Performed by: UROLOGY

## 2025-05-14 PROCEDURE — 99214 OFFICE O/P EST MOD 30 MIN: CPT | Performed by: UROLOGY

## 2025-05-14 ASSESSMENT — ENCOUNTER SYMPTOMS
OCCASIONAL FEELINGS OF UNSTEADINESS: 0
DEPRESSION: 0
LOSS OF SENSATION IN FEET: 0

## 2025-05-14 NOTE — PATIENT INSTRUCTIONS
Patient Discussion/Summary     It was very nice to see you again today. We discussed your history of lupus and lung cancer. We discussed your findings of kidney cysts on your kidney ultrasound does show simple cysts in each kidney.  Is also a question of a growth at the bottom of the bladder as seen on ultrasound.  Today you underwent a cystoscopy which did not identify a mass, rather a very prominent fold of tissue along the bottom of the bladder, you do have some inflammation along the back wall of the bladder which we will continue to follow.  You state that your urinary urgency has returned and you have previously tried Myrbetriq and Sanctura.  You would like a trial of Gemtesa at this time.  Will see you again in 3 months.     This note was created with voice-recognition software and was not corrected for typographical or grammatical errors.

## 2025-05-14 NOTE — LETTER
"May 14, 2025     Michael Kemp DO  [address]  [place] [address]    Patient: Collin Lazar   YOB: 1950   Date of Visit: 5/14/2025       Dear Dr. Michael Kemp DO:    Thank you for referring Collin Lazar to me for evaluation. Below are my notes for this consultation.  If you have questions, please do not hesitate to call me. I look forward to following your patient along with you.       Sincerely,     Juan Anguiano MD      CC: No Recipients  ______________________________________________________________________________________      Provider Impressions     74 year-old female self-referred for \"KIDNEY CYSTS\". She has a history of LUNG CANCER AND LUPUS. She is a retired nurse's aide. No family history of breast or prostate cancer. She has never smoked cigarettes.     01/16/21, patient arrives alone. She did have LUNG CANCER but had been a minimal smoker over 40 years ago. Her LUPUS is not being actively treated at this time. Urinalysis and urine culture were negative. Cytology was not performed. Renal ultrasound shows a 4.4 cm left SIMPLE RENAL CYST and a 2.6 cm right SIMPLE RENAL CYST. We will continue to watch and monitor. A discussion regarding etiology of cysts in relationship to tumors was conducted with the patient.     February 28, 2022, patient arrives alone. She states due to different insurance companies her medication for LUPUS is changed and she is starting to have symptoms. Dr. Michael Kemp is evaluating and following. Her RENAL CYSTS have slightly increased, on the right from 2.5 to 2.9 cm and on the left from 4.0 to 4.3 cm. We will continue to follow.     June 7, 2022, patient arrives alone. Renal ultrasound shows bilateral RENAL CYSTS are stable since February 2022. She will return in 1 year.     June 13, 2023, patient arrives with her daughter Joey, a surgical nurse. Renal ultrasound identifies bilateral SIMPLE CYSTS. Right side 3.2 cm and left side 4.17. " She is now complaining of URINARY INCONTINENCE during the nighttime only when she arises out of bed. She states that she has significant leakage at that point. She would like to proceed with a cystoscopy, flow studies and a discussion of treatment options. I suspect she will either benefit from a 6 PM alpha agent or an antimuscarinic.     June 19, 2023, cystoscopy, flow studies and a discussion of treatment options. Severe erythema in the lower third of the bladder, bladder base and trigone. No evidence of stones or tumors. Flow rate 6 cc/s, total volume 26 cc, PVR 28 cc. We will initiate Myrbetriq at the 50 mg dose at 6 PM. She will return in 3 months.     September 18, 2023, cystoscopy today shows improvement in the bladder base erythema. No evidence of stones or tumors. Flow rate 5 cc/s, total volume 16 cc, PVR 0 cc. She discontinued Myrbetriq after 4 days due to headaches. She continues with nighttime incontinence which she claims is quite significant. I will therefore try Sanctura 20 mg nightly and manipulate secondary to her symptoms. If she has side effects again, then I will refer her to Dr. Nestor Dias for further evaluation and treatment she will return in 3 months.     January 7, 2024, nurse visit, flow rate 27 cc/s PVR 0 cc.  States Sanctura 20 mg nightly is working well.     April 1, 2024, admitted to [facility] for chest pain     June 12, 2024, patient arrives alone.  She is discontinued Sanctura and Myrbetriq.  She finds that water aerobics have decreased her incontinence and does not wish to take medication.  Also, she has had an MRI of the pelvis and a CAT scan of the abdomen.  These only show simple cysts in the kidneys bilaterally.  She will return in 1 year.    May 5, 2025, telephone call, renal and bladder ultrasound identifies an echogenic structure in the dependent aspect of the bladder, malignancy versus hematoma.  We will schedule a cystoscopy.    May 14, 2025, cystoscopy today identifies a  "very prominent trigonal ridge which may have been identified as a possible mass on ultrasound.  She does have a significant erythematous patch directly posterior with a diameter of 2.5 cm.  We will reevaluate in 3 months.  She has had lung cancer.  Of note, although the patient discontinued Sanctura and Myrbetriq for her in continence and urgency, she states her symptoms have returned and she would like to try Gemtesa.  Otherwise, renal ultrasound identified bilateral simple cysts.     PLAN:     #1 the patient will return in 2 months with a cystoscopy and PVR    2.  Gemtesa 75 mg p.o. daily    June 2026 with a  renal ultrasound.      This note was created with voice-recognition software and was not corrected for typographical or grammatical errors    Patient ID: Collin Lazar is a 74 y.o. female.    ProceduresPt took Macrodantin as prescribed  Anesthesia: Local 2% Lidocaine  Instruments: 6F flexible disposable Cystoscope    Pt brought to procedure room and placed in dorsal lithotomy position. Pt draped and prepped in normal sterile fashion. 5ml lidocaine instilled into urethra (vagina). Pt tolerated well.    I was present as chaperone for the entirety of the procedure   Jazmyne Motta  Cystoscopy performed by Dr. Juan Anguiano      Bedside \"Time Out\" Verification   Today's Date:  5/14/25. I attest that this time out verification took place prior to the procedure.   Procedure: Cysto   RN/LPN/MA:    Provider: [facility].   Verified By: Jazmyne STEWART and Provider, Dr. Juan Anguiano.   Prior to the start of the procedure a time out was taken and the following were verified: the identity of the patient using two patient identifiers, the correct procedure, the correct site marked as indicated, the correct positioning for the patient and the correct equipment was obtained.   Cystoscopy - Collin Lazar-identified using two (2) forms of identification.   Procedure: diagnostic 2:00pm     Time Completed: 2:43 " PM  Indications for procedure: Surveillance Cystoscopy due to abnormal bladder ultrasound           Discussed with patient: Risks, benefits, and alternative were discussed in detail. Patient appears to understand and agrees to proceed. Patient has signed the procedure consent form.    CYSTOSCOPY:    Cystoscopy today reveals a normal urethra and bladder neck.  Once inside the bladder, both ureteral orifices were identified.  A full examination of the bladder reveals a very prominent trigonal ridge which may be identified on ultrasound as a mass.  1 posterior patch measuring 2.5 cm.  No tumors or stones.

## 2025-05-16 ENCOUNTER — TELEPHONE (OUTPATIENT)
Dept: UROLOGY | Facility: CLINIC | Age: 75
End: 2025-05-16
Payer: MEDICARE

## 2025-05-16 DIAGNOSIS — N39.41 URGE INCONTINENCE OF URINE: ICD-10-CM

## 2025-05-16 DIAGNOSIS — N39.43 DRIBBLING OF URINE: ICD-10-CM

## 2025-05-16 NOTE — TELEPHONE ENCOUNTER
Patient called in stating Gemtesa is too costly and is requesting an alternative. Patient has tried Myrbetriq and Sanctura in the past with no success.    Please Advise

## 2025-05-20 ENCOUNTER — PROCEDURE VISIT (OUTPATIENT)
Age: 75
End: 2025-05-20
Payer: MEDICARE

## 2025-05-20 VITALS
TEMPERATURE: 97.3 F | BODY MASS INDEX: 27.49 KG/M2 | WEIGHT: 165 LBS | DIASTOLIC BLOOD PRESSURE: 100 MMHG | OXYGEN SATURATION: 99 % | HEIGHT: 65 IN | HEART RATE: 67 BPM | SYSTOLIC BLOOD PRESSURE: 160 MMHG

## 2025-05-20 DIAGNOSIS — M54.16 LUMBAR RADICULOPATHY: Primary | ICD-10-CM

## 2025-05-20 PROCEDURE — 64484 NJX AA&/STRD TFRM EPI L/S EA: CPT | Performed by: PHYSICAL MEDICINE & REHABILITATION

## 2025-05-20 PROCEDURE — 64483 NJX AA&/STRD TFRM EPI L/S 1: CPT | Performed by: PHYSICAL MEDICINE & REHABILITATION

## 2025-05-20 RX ORDER — DEXAMETHASONE SODIUM PHOSPHATE 10 MG/ML
10 INJECTION, SOLUTION INTRAMUSCULAR; INTRAVENOUS ONCE
Status: COMPLETED | OUTPATIENT
Start: 2025-05-20 | End: 2025-05-20

## 2025-05-20 RX ORDER — SOLIFENACIN SUCCINATE 10 MG/1
10 TABLET, FILM COATED ORAL DAILY
Qty: 90 TABLET | Refills: 3 | Status: SHIPPED | OUTPATIENT
Start: 2025-05-20 | End: 2026-05-15

## 2025-05-20 RX ORDER — BUPIVACAINE HYDROCHLORIDE 5 MG/ML
10 INJECTION, SOLUTION PERINEURAL ONCE
Status: COMPLETED | OUTPATIENT
Start: 2025-05-20 | End: 2025-05-20

## 2025-05-20 RX ORDER — SODIUM CHLORIDE 9 MG/ML
1.5 INJECTION, SOLUTION INTRAMUSCULAR; INTRAVENOUS; SUBCUTANEOUS ONCE
Status: COMPLETED | OUTPATIENT
Start: 2025-05-20 | End: 2025-05-20

## 2025-05-20 RX ADMIN — BUPIVACAINE HYDROCHLORIDE 50 MG: 5 INJECTION, SOLUTION PERINEURAL at 16:48

## 2025-05-20 RX ADMIN — DEXAMETHASONE SODIUM PHOSPHATE 10 MG: 10 INJECTION INTRAMUSCULAR; INTRAVENOUS at 16:49

## 2025-05-20 RX ADMIN — SODIUM CHLORIDE 1.5 ML: 9 INJECTION, SOLUTION INTRAMUSCULAR; INTRAVENOUS; SUBCUTANEOUS at 16:49

## 2025-05-20 ASSESSMENT — PAIN DESCRIPTION - LOCATION
LOCATION: BACK
LOCATION: BACK

## 2025-05-20 ASSESSMENT — PAIN SCALES - GENERAL
PAINLEVEL_OUTOF10: 5
PAINLEVEL_OUTOF10: 8

## 2025-05-20 NOTE — PROGRESS NOTES
Lumbar Transforaminal Epidural Steroid Injection (TFESI)    Patient Name: Rosemarie Grant   : 1950  Date: 2025     Physician: Isreal David MD     INDICATIONS: Rosemarie Grant is a 74 y.o. female who presents with symptoms and physical exam findings consistent with lumbar radiculopathy. She has lumbosacral paresthesias with a positive straight leg raise on physical exam. She has had persistent pain that limits her activities of daily living. The pain is persistent despite conservative measures. She has significant functional and psychological impairment due to this condition. Given her symptoms, physical exam findings, impairment in activities of daily living, and lack of response to conservative measures, consideration for lumbar transforaminal epidural corticosteroid injection was given. Discussed the risks including but not limited to bleeding, infection, worsened pain, damage to surrounding structures, side effects, toxicity, allergic reactions to medications used, need for surgery, headache, vision changes, difficulty with chewing and/or swallowing, immune and stress-response dysfunction, fat necrosis, skin pigmentation changes, blood sugar elevation, as well as catastrophic injury such as vision loss/blindness, paralysis, spinal cord or plexus injury, cerebral brainstem or spinal cord infarction, intrathecal injection, spinal cord puncture, arachnoiditis, discitis, stroke, bowel/bladder/sexual dysfunction, ventilator dependence, loss of use of the arms and/or legs, and death. Discussed off-label use of corticosteroids and how the Food and Drug Administration (FDA) has not approved corticosteroids for epidural use. Discussed the risks, benefits, alternative procedures, and alternatives to the procedure including no procedure at all. Discussed that we cannot undo any permanent neurologic or orthopaedic damage or change the course of any underlying disease. After thorough discussion, patient

## 2025-05-20 NOTE — TELEPHONE ENCOUNTER
I have tried to reach patient several times and the mailbox is full, unable to leave a message. Patient has not called back. Once we are able to connect I will discuss Vesicare with the patient.    10:04am pt returned call and agrees to try BioMedFlexre.

## 2025-06-09 ENCOUNTER — APPOINTMENT (OUTPATIENT)
Dept: CARDIOLOGY | Facility: CLINIC | Age: 75
End: 2025-06-09
Payer: MEDICARE

## 2025-06-11 ENCOUNTER — APPOINTMENT (OUTPATIENT)
Dept: UROLOGY | Facility: CLINIC | Age: 75
End: 2025-06-11
Payer: MEDICARE

## 2025-08-01 ENCOUNTER — HOSPITAL ENCOUNTER (OUTPATIENT)
Dept: RADIOLOGY | Facility: HOSPITAL | Age: 75
Discharge: HOME | End: 2025-08-01
Payer: MEDICARE

## 2025-08-01 DIAGNOSIS — R79.89 OTHER SPECIFIED ABNORMAL FINDINGS OF BLOOD CHEMISTRY: ICD-10-CM

## 2025-08-01 PROCEDURE — 76705 ECHO EXAM OF ABDOMEN: CPT

## 2025-08-06 ENCOUNTER — OFFICE VISIT (OUTPATIENT)
Age: 75
End: 2025-08-06
Payer: MEDICARE

## 2025-08-06 VITALS
BODY MASS INDEX: 29.12 KG/M2 | DIASTOLIC BLOOD PRESSURE: 88 MMHG | HEART RATE: 72 BPM | RESPIRATION RATE: 16 BRPM | WEIGHT: 175 LBS | SYSTOLIC BLOOD PRESSURE: 138 MMHG | OXYGEN SATURATION: 98 %

## 2025-08-06 DIAGNOSIS — I10 ESSENTIAL (PRIMARY) HYPERTENSION: Primary | ICD-10-CM

## 2025-08-06 PROCEDURE — 3079F DIAST BP 80-89 MM HG: CPT | Performed by: INTERNAL MEDICINE

## 2025-08-06 PROCEDURE — 99214 OFFICE O/P EST MOD 30 MIN: CPT | Performed by: INTERNAL MEDICINE

## 2025-08-06 PROCEDURE — 3075F SYST BP GE 130 - 139MM HG: CPT | Performed by: INTERNAL MEDICINE

## 2025-08-06 PROCEDURE — 1159F MED LIST DOCD IN RCRD: CPT | Performed by: INTERNAL MEDICINE

## 2025-08-06 PROCEDURE — 1123F ACP DISCUSS/DSCN MKR DOCD: CPT | Performed by: INTERNAL MEDICINE

## 2025-08-06 ASSESSMENT — ENCOUNTER SYMPTOMS
ABDOMINAL PAIN: 0
WHEEZING: 0
CHEST TIGHTNESS: 0
COUGH: 0
CONSTIPATION: 0
DIARRHEA: 0
EYE REDNESS: 0
RHINORRHEA: 0
SHORTNESS OF BREATH: 0
APNEA: 0
NAUSEA: 0
VOMITING: 0
COLOR CHANGE: 0

## 2025-08-07 RX ORDER — NITROFURANTOIN MACROCRYSTALS 50 MG/1
50 CAPSULE ORAL EVERY 12 HOURS
Qty: 4 CAPSULE | Refills: 0 | Status: SHIPPED | OUTPATIENT
Start: 2025-08-07 | End: 2025-08-09

## 2025-08-15 ENCOUNTER — APPOINTMENT (OUTPATIENT)
Dept: UROLOGY | Facility: CLINIC | Age: 75
End: 2025-08-15
Payer: MEDICARE

## 2025-08-15 VITALS
HEART RATE: 76 BPM | DIASTOLIC BLOOD PRESSURE: 78 MMHG | WEIGHT: 176.15 LBS | BODY MASS INDEX: 27.59 KG/M2 | RESPIRATION RATE: 16 BRPM | SYSTOLIC BLOOD PRESSURE: 146 MMHG

## 2025-08-15 DIAGNOSIS — N39.41 URGE INCONTINENCE OF URINE: ICD-10-CM

## 2025-08-15 DIAGNOSIS — R30.0 DYSURIA: ICD-10-CM

## 2025-08-15 DIAGNOSIS — N28.1 RENAL CYST: Primary | ICD-10-CM

## 2025-08-15 DIAGNOSIS — N39.43 DRIBBLING OF URINE: ICD-10-CM

## 2025-08-15 PROCEDURE — 52000 CYSTOURETHROSCOPY: CPT | Performed by: UROLOGY

## 2025-09-03 RX ORDER — ASPIRIN 81 MG
81 TABLET,CHEWABLE ORAL DAILY
Qty: 90 TABLET | Refills: 3 | Status: SHIPPED | OUTPATIENT
Start: 2025-09-03

## 2025-10-08 ENCOUNTER — APPOINTMENT (OUTPATIENT)
Dept: UROLOGY | Facility: CLINIC | Age: 75
End: 2025-10-08
Payer: MEDICARE

## (undated) DEVICE — KIT EVAC 400CC PVC RADPQ Y CONN

## (undated) DEVICE — SUTURE VICRYL SZ 4-0 L18IN ABSRB UD L19MM PS-2 3/8 CIR PRIM J496H

## (undated) DEVICE — SPONGE DRN W4XL4IN RAYON/POLYESTER 6 PLY NONWOVEN PRECUT 2 PER PK

## (undated) DEVICE — SUTURE ABSORBABLE ANTIBACT 1-0 CT-1 24 IN STRATAFIX PDS + SXPP1A443

## (undated) DEVICE — CARBIDE MATCH HEAD

## (undated) DEVICE — LIQUIBAND RAPID ADHESIVE 36/CS 0.8ML: Brand: MEDLINE

## (undated) DEVICE — GLOVE ORANGE PI 7 1/2   MSG9075

## (undated) DEVICE — DRAIN SURG 10FR L1/8IN RND CHN FULL FLUT HEAT POLISHED PERF

## (undated) DEVICE — NEURO: Brand: MEDLINE INDUSTRIES, INC.

## (undated) DEVICE — LABEL MED MINI W/ MARKER

## (undated) DEVICE — GLOVE ORANGE PI 8   MSG9080

## (undated) DEVICE — 1010 S-DRAPE TOWEL DRAPE 10/BX: Brand: STERI-DRAPE™

## (undated) DEVICE — FLOSEAL WITH RECOTHROM - 10ML.: Brand: FLOSEAL HEMOSTATIC MATRIX

## (undated) DEVICE — SUTURE VICRYL + SZ 2-0 L27IN ABSRB UD CP-1 1/2 CIR REV CUT VCP266H

## (undated) DEVICE — SUTURE VICRYL SZ 3-0 L18IN ABSRB UD PS-2 L19MM 3/8 CRV PRIM J497H

## (undated) DEVICE — SYRINGE MED 30ML STD CLR PLAS LUERLOCK TIP N CTRL DISP

## (undated) DEVICE — 4-PORT MANIFOLD: Brand: NEPTUNE 2

## (undated) DEVICE — COVER MICSCP W46XL120IN 4 BINOC GLS LENS LEICA

## (undated) DEVICE — KAIRISON TUBING SET PNEUMATIC, (3000 MM), STERILE, DISPOSABLE, TO BE USED WITH: FK898R, PACKAGE OF 10 PIECES: Brand: KAIRISON

## (undated) DEVICE — SUTURE VICRYL SZ 0 L27IN ABSRB UD L26MM CP-2 1/2 CIR SGL J870H